# Patient Record
Sex: FEMALE | Race: WHITE | NOT HISPANIC OR LATINO | Employment: FULL TIME | URBAN - METROPOLITAN AREA
[De-identification: names, ages, dates, MRNs, and addresses within clinical notes are randomized per-mention and may not be internally consistent; named-entity substitution may affect disease eponyms.]

---

## 2023-02-22 ENCOUNTER — PATIENT MESSAGE (OUTPATIENT)
Dept: PSYCHIATRY | Facility: CLINIC | Age: 28
End: 2023-02-22
Payer: COMMERCIAL

## 2023-02-22 ENCOUNTER — OFFICE VISIT (OUTPATIENT)
Dept: INTERNAL MEDICINE | Facility: CLINIC | Age: 28
End: 2023-02-22
Payer: COMMERCIAL

## 2023-02-22 VITALS
DIASTOLIC BLOOD PRESSURE: 70 MMHG | TEMPERATURE: 98 F | HEIGHT: 67 IN | HEART RATE: 80 BPM | OXYGEN SATURATION: 99 % | WEIGHT: 234.88 LBS | SYSTOLIC BLOOD PRESSURE: 110 MMHG | BODY MASS INDEX: 36.87 KG/M2

## 2023-02-22 DIAGNOSIS — Z98.2 PRESENCE OF VENTRICULAR SHUNT: ICD-10-CM

## 2023-02-22 DIAGNOSIS — G89.29 CHRONIC ABDOMINAL PAIN: ICD-10-CM

## 2023-02-22 DIAGNOSIS — G93.2 IDIOPATHIC INTRACRANIAL HYPERTENSION: ICD-10-CM

## 2023-02-22 DIAGNOSIS — Z76.89 ENCOUNTER TO ESTABLISH CARE: Primary | ICD-10-CM

## 2023-02-22 DIAGNOSIS — R10.9 CHRONIC ABDOMINAL PAIN: ICD-10-CM

## 2023-02-22 DIAGNOSIS — R11.0 CHRONIC NAUSEA: ICD-10-CM

## 2023-02-22 DIAGNOSIS — F31.78 BIPOLAR DISORDER, IN FULL REMISSION, MOST RECENT EPISODE MIXED: ICD-10-CM

## 2023-02-22 PROCEDURE — 3078F DIAST BP <80 MM HG: CPT | Mod: CPTII,S$GLB,, | Performed by: PHYSICIAN ASSISTANT

## 2023-02-22 PROCEDURE — 99999 PR PBB SHADOW E&M-EST. PATIENT-LVL V: CPT | Mod: PBBFAC,,, | Performed by: PHYSICIAN ASSISTANT

## 2023-02-22 PROCEDURE — 3074F PR MOST RECENT SYSTOLIC BLOOD PRESSURE < 130 MM HG: ICD-10-PCS | Mod: CPTII,S$GLB,, | Performed by: PHYSICIAN ASSISTANT

## 2023-02-22 PROCEDURE — 1159F PR MEDICATION LIST DOCUMENTED IN MEDICAL RECORD: ICD-10-PCS | Mod: CPTII,S$GLB,, | Performed by: PHYSICIAN ASSISTANT

## 2023-02-22 PROCEDURE — 3008F BODY MASS INDEX DOCD: CPT | Mod: CPTII,S$GLB,, | Performed by: PHYSICIAN ASSISTANT

## 2023-02-22 PROCEDURE — 99385 PREV VISIT NEW AGE 18-39: CPT | Mod: S$GLB,,, | Performed by: PHYSICIAN ASSISTANT

## 2023-02-22 PROCEDURE — 3078F PR MOST RECENT DIASTOLIC BLOOD PRESSURE < 80 MM HG: ICD-10-PCS | Mod: CPTII,S$GLB,, | Performed by: PHYSICIAN ASSISTANT

## 2023-02-22 PROCEDURE — 3008F PR BODY MASS INDEX (BMI) DOCUMENTED: ICD-10-PCS | Mod: CPTII,S$GLB,, | Performed by: PHYSICIAN ASSISTANT

## 2023-02-22 PROCEDURE — 99385 PR PREVENTIVE VISIT,NEW,18-39: ICD-10-PCS | Mod: S$GLB,,, | Performed by: PHYSICIAN ASSISTANT

## 2023-02-22 PROCEDURE — 99999 PR PBB SHADOW E&M-EST. PATIENT-LVL V: ICD-10-PCS | Mod: PBBFAC,,, | Performed by: PHYSICIAN ASSISTANT

## 2023-02-22 PROCEDURE — 3074F SYST BP LT 130 MM HG: CPT | Mod: CPTII,S$GLB,, | Performed by: PHYSICIAN ASSISTANT

## 2023-02-22 PROCEDURE — 1159F MED LIST DOCD IN RCRD: CPT | Mod: CPTII,S$GLB,, | Performed by: PHYSICIAN ASSISTANT

## 2023-02-22 RX ORDER — BUPROPION HYDROCHLORIDE 150 MG/1
150 TABLET ORAL
COMMUNITY
Start: 2023-01-26 | End: 2023-05-25 | Stop reason: SDUPTHER

## 2023-02-22 RX ORDER — LURASIDONE HYDROCHLORIDE 120 MG/1
120 TABLET, FILM COATED ORAL NIGHTLY
COMMUNITY
End: 2023-05-25

## 2023-02-22 RX ORDER — PHENTERMINE HYDROCHLORIDE 37.5 MG/1
37.5 TABLET ORAL
COMMUNITY
Start: 2023-02-10 | End: 2023-05-29

## 2023-02-22 RX ORDER — BUSPIRONE HYDROCHLORIDE 10 MG/1
10 TABLET ORAL 2 TIMES DAILY
COMMUNITY
End: 2023-05-25 | Stop reason: SDUPTHER

## 2023-02-22 RX ORDER — ONDANSETRON 4 MG/1
4 TABLET, FILM COATED ORAL EVERY 8 HOURS PRN
Qty: 30 TABLET | Refills: 0 | Status: SHIPPED | OUTPATIENT
Start: 2023-02-22

## 2023-02-22 RX ORDER — LAMOTRIGINE 100 MG/1
100 TABLET ORAL
COMMUNITY
Start: 2023-02-03 | End: 2023-05-25 | Stop reason: SDUPTHER

## 2023-02-22 RX ORDER — TRAZODONE HYDROCHLORIDE 50 MG/1
25-50 TABLET ORAL NIGHTLY PRN
COMMUNITY
Start: 2023-02-09 | End: 2023-05-25 | Stop reason: SDUPTHER

## 2023-02-22 NOTE — PROGRESS NOTES
Subjective:       Patient ID: Rafaela Zuniga is a 27 y.o. female.    Chief Complaint: [facility] Care      Patient presents to clinic today for [facility] care annual physical.      Review of Systems   Constitutional:  Negative for chills, fatigue, fever and unexpected weight change.   HENT:  Negative for congestion, dental problem, ear pain, hearing loss, rhinorrhea and trouble swallowing.    Eyes:  Negative for pain and visual disturbance.   Respiratory:  Negative for cough and shortness of breath.    Cardiovascular:  Negative for chest pain, palpitations and leg swelling.   Gastrointestinal:  Positive for abdominal pain (chronic, previously seen GI, given Zofran, allergy testing done with no clear answers) and nausea (chronic). Negative for abdominal distention, blood in stool, constipation, diarrhea and vomiting.   Genitourinary:  Negative for difficulty urinating and vaginal discharge.   Musculoskeletal:  Negative for arthralgias and myalgias.   Skin:  Negative for rash.   Neurological:  Positive for headaches (history of migraine headaches when she was a teenage, saw ophth then [facility], diagnosed with IIH,  shunt placed, headaches improved). Negative for dizziness, weakness and numbness.   Hematological:  Negative for adenopathy. Does not bruise/bleed easily.   Psychiatric/Behavioral:  Positive for dysphoric mood (chronic) and sleep disturbance (chronic). The patient is nervous/anxious (chronic).      Objective:      Physical Exam  Vitals and nursing note reviewed.   Constitutional:       General: She is not in acute distress.     Appearance: Normal appearance. She is well-developed.   HENT:      Head: Normocephalic and atraumatic.      Right Ear: Tympanic membrane, ear canal and external ear normal.      Left Ear: Tympanic membrane, ear canal and external ear normal.      Nose: Nose normal. No mucosal edema or rhinorrhea.      Mouth/Throat:      Lips: Pink.      Mouth: Mucous membranes are moist.      Pharynx:  Oropharynx is clear. Uvula midline.   Eyes:      General: Lids are normal. No scleral icterus.     Extraocular Movements: Extraocular movements intact.      Conjunctiva/sclera: Conjunctivae normal.      Pupils: Pupils are equal, round, and reactive to light.   Neck:      Thyroid: No thyromegaly.   Cardiovascular:      Rate and Rhythm: Normal rate and regular rhythm.      Pulses: Normal pulses.   Pulmonary:      Effort: Pulmonary effort is normal.      Breath sounds: Normal breath sounds. No wheezing, rhonchi or rales.   Abdominal:      General: Bowel sounds are normal. There is no distension.      Palpations: Abdomen is soft. There is no mass.      Tenderness: There is no abdominal tenderness.   Musculoskeletal:         General: Normal range of motion.      Cervical back: Normal range of motion and neck supple.      Right lower leg: No edema.      Left lower leg: No edema.   Lymphadenopathy:      Cervical: No cervical adenopathy.   Skin:     General: Skin is warm and dry.      Findings: No lesion or rash.      Nails: There is no clubbing.   Neurological:      Mental Status: She is alert.      Cranial Nerves: No cranial nerve deficit.      Sensory: No sensory deficit.   Psychiatric:         Mood and Affect: Mood and affect normal.       Assessment:       1. Encounter to establish care    2. Chronic nausea    3. Chronic abdominal pain    4. Bipolar disorder, in full remission, most recent episode mixed    5. Idiopathic intracranial hypertension    6. Presence of ventricular shunt          Plan:   1. Encounter to establish care    2. Chronic nausea  -     Ambulatory referral/consult to Gastroenterology; Future; Expected date: 03/01/2023  -     ondansetron (ZOFRAN) 4 MG tablet; Take 1 tablet (4 mg total) by mouth every 8 (eight) hours as needed for Nausea.  Dispense: 30 tablet; Refill: 0    3. Chronic abdominal pain  -     Ambulatory referral/consult to Gastroenterology; Future; Expected date: 03/01/2023    4. Bipolar  disorder, in full remission, most recent episode mixed  Overview:  Previously followed by Psychiatry    Orders:  -     Ambulatory referral/consult to Psychiatry; Future; Expected date: 03/01/2023    5. Idiopathic intracranial hypertension  Overview:  Previously followed by neurosurgery in [place], has  shunt    Orders:  -     Ambulatory referral/consult to Neurosurgery; Future; Expected date: 03/01/2023    6. Presence of ventricular shunt  Overview:  Placed 11/8/2017 in [place]    Orders:  -     Ambulatory referral/consult to Neurosurgery; Future; Expected date: 03/01/2023        6 month f/u with Dr. Garcia scheduled  GI, NSY and Psyc referrals placed  Encouraged to reschedule GYN appt  Health Maintenance reviewed/updated.

## 2023-03-10 ENCOUNTER — TELEPHONE (OUTPATIENT)
Dept: NEUROSURGERY | Facility: CLINIC | Age: 28
End: 2023-03-10
Payer: COMMERCIAL

## 2023-03-10 NOTE — TELEPHONE ENCOUNTER
Informed pt need to reschedule appt with Dr Montez due to her being out of the office on 4/10. Pt informed me that she got her shunt placed 7 years ago and the last time she got imaging was 3 years ago at [facility]. Pt was followed by a neuro ophthalmologist in [place] and her neurosurgeon was at [place]. Pt stated she does not have any past imaging on a disc and would be unable to get it due to imaging being in [place] and she cannot retrieve it without going in person and paying a fee. Pt stated she will call her neuro ophthalmologist and neurosurgeon and have them fax us their clinic notes before the appt so we have some information. Pt stated currently not having any symptoms but wants to get imaging and care established in [place] since she has not followed up with neurosurgery in [facility]. Confirmed rescheduled appt with Claudia Araujo on 4/12 at 1pm.

## 2023-03-10 NOTE — TELEPHONE ENCOUNTER
Requested call back to reschedule appt on 4/10 due to Dr Montez being out of the office on that day

## 2023-03-31 ENCOUNTER — TELEPHONE (OUTPATIENT)
Dept: PSYCHIATRY | Facility: CLINIC | Age: 28
End: 2023-03-31
Payer: COMMERCIAL

## 2023-03-31 NOTE — TELEPHONE ENCOUNTER
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 please call back [phone]

## 2023-04-10 ENCOUNTER — PATIENT MESSAGE (OUTPATIENT)
Dept: NEUROSURGERY | Facility: CLINIC | Age: 28
End: 2023-04-10
Payer: COMMERCIAL

## 2023-04-12 ENCOUNTER — OFFICE VISIT (OUTPATIENT)
Dept: NEUROSURGERY | Facility: CLINIC | Age: 28
End: 2023-04-12
Payer: COMMERCIAL

## 2023-04-12 DIAGNOSIS — G93.2 IDIOPATHIC INTRACRANIAL HYPERTENSION: ICD-10-CM

## 2023-04-12 DIAGNOSIS — Z98.2 PRESENCE OF VENTRICULAR SHUNT: ICD-10-CM

## 2023-04-12 PROCEDURE — 99204 PR OFFICE/OUTPT VISIT, NEW, LEVL IV, 45-59 MIN: ICD-10-PCS | Mod: S$GLB,,, | Performed by: PHYSICIAN ASSISTANT

## 2023-04-12 PROCEDURE — 1159F PR MEDICATION LIST DOCUMENTED IN MEDICAL RECORD: ICD-10-PCS | Mod: CPTII,S$GLB,, | Performed by: PHYSICIAN ASSISTANT

## 2023-04-12 PROCEDURE — 1159F MED LIST DOCD IN RCRD: CPT | Mod: CPTII,S$GLB,, | Performed by: PHYSICIAN ASSISTANT

## 2023-04-12 PROCEDURE — 99204 OFFICE O/P NEW MOD 45 MIN: CPT | Mod: S$GLB,,, | Performed by: PHYSICIAN ASSISTANT

## 2023-04-12 PROCEDURE — 99999 PR PBB SHADOW E&M-EST. PATIENT-LVL III: CPT | Mod: PBBFAC,,, | Performed by: PHYSICIAN ASSISTANT

## 2023-04-12 PROCEDURE — 99999 PR PBB SHADOW E&M-EST. PATIENT-LVL III: ICD-10-PCS | Mod: PBBFAC,,, | Performed by: PHYSICIAN ASSISTANT

## 2023-04-13 NOTE — PROGRESS NOTES
Neurosurgery  History & Physical    SUBJECTIVE:     Chief Complaint: IIH, headaches    History of Present Illness:  Patient is a 27-year-old female with idiopathic intracranial hypertension status post  shunt (certas 6) placed in 2017 at [facility] who presents today to [facility] care.  She does not have recent imaging for review for review but she does bring a copy of her last CT head from 2017.  Her preoperative symptoms included pressure headaches, balance difficulty and decline in her vision.  She reports the symptoms significantly improved postoperatively and has done well with her shunt until the past few months.  She now reports headaches have returned with increasing frequency and severity.  She notes new dizziness and ringing in her ears as well as nausea with the headaches.  She states her vision is stable.  Her last Neuro-Ophthalmology evaluation was done in 2021 at [facility].  She will obtain these records for review.  She describes her headaches as pressure and pulsatile.  They are not positional.  She takes ibuprofen and Tylenol without relief.  She does note a recent 40 lb weight loss.  Her Certas valve was adjusted from 5-6 in 2020 for concerns of over shunting.    Review of patient's allergies indicates:  No Known Allergies    Current Outpatient Medications   Medication Sig Dispense Refill    buPROPion (WELLBUTRIN XL) 150 MG TB24 tablet Take 150 mg by mouth.      busPIRone (BUSPAR) 10 MG tablet Take 10 mg by mouth once daily.      lamoTRIgine (LAMICTAL) 100 MG tablet Take 100 mg by mouth.      ondansetron (ZOFRAN) 4 MG tablet Take 1 tablet (4 mg total) by mouth every 8 (eight) hours as needed for Nausea. 30 tablet 0    traZODone (DESYREL) 50 MG tablet Take 25-50 mg by mouth nightly as needed.      lurasidone 120 mg Tab Take 120 mg by mouth nightly.      phentermine (ADIPEX-P) 37.5 mg tablet Take 37.5 mg by mouth.       No current facility-administered medications for this  visit.       Past Medical History:   Diagnosis Date    Anxiety     Bipolar disorder     Depression     Idiopathic intracranial hypertension     Insomnia      Past Surgical History:   Procedure Laterality Date    brain shunt      eye sheath finistration Bilateral      Family History       Problem Relation (Age of Onset)    Depression Maternal Grandmother    Diabetes Maternal Grandmother    Heart disease Mother, Maternal Grandfather    Thyroid disease Maternal Grandmother          Social History     Socioeconomic History    Marital status: Single   Tobacco Use    Smoking status: Never    Smokeless tobacco: Never   Substance and Sexual Activity    Alcohol use: Yes     Comment: Drinks socially    Drug use: Never    Sexual activity: Yes     Partners: Female, Male     Birth control/protection: I.U.D.       Review of Systems   Gastrointestinal:  Positive for nausea. Negative for vomiting.   Musculoskeletal:  Positive for gait problem.   Neurological:  Positive for dizziness and headaches. Negative for seizures, weakness and numbness.   All other systems reviewed and are negative.    OBJECTIVE:     Vital Signs  Pain Score: 0-No pain  There is no height or weight on file to calculate BMI.      Neurosurgery Physical Exam  General: well developed, well nourished, no distress.   Head: normocephalic, atraumatic  Neurologic: Alert and oriented. Thought content appropriate.  GCS: Motor: 6/Verbal: 5/Eyes: 4 GCS Total: 15  Mental Status: Awake, Alert, Oriented x3  Cranial nerves: face symmetric, tongue midline, CN II-XII grossly intact.   Eyes: pupils equal, round, reactive to light with accomodation, EOMI.   Sensory: intact to light touch throughout  Motor Strength: Moves all extremities spontaneously with good tone.  Full strength upper and lower extremities. No abnormal movements seen.   DTR's - 2 + and symmetric in UE and LE  Pronator Drift: no drift noted  Finger-to-nose: Intact bilaterally  Morgan: absent  Clonus:  absent  Gait: normal      Diagnostic Results:  None new    ASSESSMENT/PLAN:     27-year-old female with IIH status post  shunt (Certas 6) who presents today with complaint of worsening headaches.  I would like to obtain an updated CT head as well as shunt series to evaluate her shunt.  I would also like an MRV to evaluate for venous stenosis.  I will refer her to Neuro-Ophthalmology for an updated eye exam.  She will obtain previous records from her neuro ophthalmologist at [facility].  I will plan to see her back once the above imaging is complete.  She was encouraged to call us with any new or worsening symptoms in the interim.      Claudia Araujo PA-C  Neurosurgery          Note dictated with voice recognition software, please excuse any grammatical errors.

## 2023-04-21 ENCOUNTER — TELEPHONE (OUTPATIENT)
Dept: NEUROSURGERY | Facility: CLINIC | Age: 28
End: 2023-04-21
Payer: COMMERCIAL

## 2023-04-25 ENCOUNTER — OFFICE VISIT (OUTPATIENT)
Dept: GASTROENTEROLOGY | Facility: CLINIC | Age: 28
End: 2023-04-25
Payer: COMMERCIAL

## 2023-04-25 VITALS
HEIGHT: 67 IN | OXYGEN SATURATION: 98 % | BODY MASS INDEX: 35.11 KG/M2 | SYSTOLIC BLOOD PRESSURE: 124 MMHG | WEIGHT: 223.69 LBS | HEART RATE: 93 BPM | DIASTOLIC BLOOD PRESSURE: 88 MMHG

## 2023-04-25 DIAGNOSIS — R11.0 CHRONIC NAUSEA: Primary | ICD-10-CM

## 2023-04-25 DIAGNOSIS — R19.7 DIARRHEA, UNSPECIFIED TYPE: ICD-10-CM

## 2023-04-25 DIAGNOSIS — G89.29 CHRONIC ABDOMINAL PAIN: ICD-10-CM

## 2023-04-25 DIAGNOSIS — R10.9 CHRONIC ABDOMINAL PAIN: ICD-10-CM

## 2023-04-25 PROCEDURE — 3008F PR BODY MASS INDEX (BMI) DOCUMENTED: ICD-10-PCS | Mod: CPTII,S$GLB,, | Performed by: INTERNAL MEDICINE

## 2023-04-25 PROCEDURE — 1159F PR MEDICATION LIST DOCUMENTED IN MEDICAL RECORD: ICD-10-PCS | Mod: CPTII,S$GLB,, | Performed by: INTERNAL MEDICINE

## 2023-04-25 PROCEDURE — 3079F DIAST BP 80-89 MM HG: CPT | Mod: CPTII,S$GLB,, | Performed by: INTERNAL MEDICINE

## 2023-04-25 PROCEDURE — 1159F MED LIST DOCD IN RCRD: CPT | Mod: CPTII,S$GLB,, | Performed by: INTERNAL MEDICINE

## 2023-04-25 PROCEDURE — 99204 OFFICE O/P NEW MOD 45 MIN: CPT | Mod: S$GLB,,, | Performed by: INTERNAL MEDICINE

## 2023-04-25 PROCEDURE — 3008F BODY MASS INDEX DOCD: CPT | Mod: CPTII,S$GLB,, | Performed by: INTERNAL MEDICINE

## 2023-04-25 PROCEDURE — 3079F PR MOST RECENT DIASTOLIC BLOOD PRESSURE 80-89 MM HG: ICD-10-PCS | Mod: CPTII,S$GLB,, | Performed by: INTERNAL MEDICINE

## 2023-04-25 PROCEDURE — 99204 PR OFFICE/OUTPT VISIT, NEW, LEVL IV, 45-59 MIN: ICD-10-PCS | Mod: S$GLB,,, | Performed by: INTERNAL MEDICINE

## 2023-04-25 PROCEDURE — 3074F PR MOST RECENT SYSTOLIC BLOOD PRESSURE < 130 MM HG: ICD-10-PCS | Mod: CPTII,S$GLB,, | Performed by: INTERNAL MEDICINE

## 2023-04-25 PROCEDURE — 99999 PR PBB SHADOW E&M-EST. PATIENT-LVL V: CPT | Mod: PBBFAC,,, | Performed by: INTERNAL MEDICINE

## 2023-04-25 PROCEDURE — 99999 PR PBB SHADOW E&M-EST. PATIENT-LVL V: ICD-10-PCS | Mod: PBBFAC,,, | Performed by: INTERNAL MEDICINE

## 2023-04-25 PROCEDURE — 3074F SYST BP LT 130 MM HG: CPT | Mod: CPTII,S$GLB,, | Performed by: INTERNAL MEDICINE

## 2023-04-25 RX ORDER — SODIUM, POTASSIUM,MAG SULFATES 17.5-3.13G
1 SOLUTION, RECONSTITUTED, ORAL ORAL DAILY
Qty: 1 KIT | Refills: 0 | Status: SHIPPED | OUTPATIENT
Start: 2023-04-25 | End: 2023-04-27

## 2023-04-25 RX ORDER — PANTOPRAZOLE SODIUM 40 MG/1
40 TABLET, DELAYED RELEASE ORAL DAILY
Qty: 30 TABLET | Refills: 3 | Status: SHIPPED | OUTPATIENT
Start: 2023-04-25 | End: 2023-07-24

## 2023-04-25 NOTE — PROGRESS NOTES
[facility] [place]  Gastroenterology    Patient evaluated at the request of Brenda Acosta PA-C  [address] [place] [address]    PCP: Anila Garcia MD    4/25/23    HPI       Diarrhea     Additional comments: Pt present today with c/o rt flank pain, nausea after eating, sores in mouth, diarrhea           Last edited by Toni Milligan, MA on 4/25/2023  2:13 PM.        Reason for Visit: Chronic abdominal pain, chronic nausea    Subjective:   Rafaela Zuniga is a 27 y.o. female here for evaluation of chronic abdominal pain and nausea which are symptoms she has had for years. States [place] and [place] a GI there but was not told much. She has chronic postprandial abd pain, nausea, can break out into rashes after eating, diarrhea after eating and sores in mouth. Celiac testing previously negative. She had allergy testing done which was negative. She had an xray that showed constipation and she was placed onto Miralax but symptoms continued. Never tried a PPI. No prior gallbladder evaluation either although this was being considered. No prior endoscopies done.       Past Medical History:   Diagnosis Date    Anxiety     Bipolar disorder     Depression     Idiopathic intracranial hypertension     Insomnia        Past Surgical History:   Procedure Laterality Date    brain shunt      eye sheath finistration Bilateral        Current Outpatient Medications on File Prior to Visit   Medication Sig Dispense Refill    buPROPion (WELLBUTRIN XL) 150 MG TB24 tablet Take 150 mg by mouth.      busPIRone (BUSPAR) 10 MG tablet Take 10 mg by mouth once daily.      lamoTRIgine (LAMICTAL) 100 MG tablet Take 100 mg by mouth.      lurasidone 120 mg Tab Take 120 mg by mouth nightly.      ondansetron (ZOFRAN) 4 MG tablet Take 1 tablet (4 mg total) by mouth every 8 (eight) hours as needed for Nausea. 30 tablet 0    traZODone (DESYREL) 50 MG tablet Take 25-50 mg by mouth nightly as  needed.      phentermine (ADIPEX-P) 37.5 mg tablet Take 37.5 mg by mouth.       No current facility-administered medications on file prior to visit.       Review of patient's allergies indicates:  No Known Allergies    Social History     Socioeconomic History    Marital status: Single   Tobacco Use    Smoking status: Never    Smokeless tobacco: Never   Substance and Sexual Activity    Alcohol use: Yes     Comment: Drinks socially    Drug use: Never    Sexual activity: Yes     Partners: Female, Male     Birth control/protection: I.U.D.       Family History   Problem Relation Age of Onset    Heart disease Mother     Diabetes Maternal Grandmother     Depression Maternal Grandmother     Thyroid disease Maternal Grandmother     Heart disease Maternal Grandfather        Review of Systems   Constitutional:  Negative for appetite change, fever and unexpected weight change.   HENT:  Negative for postnasal drip, rhinorrhea, sneezing, sore throat and trouble swallowing.    Eyes:  Negative for visual disturbance.   Respiratory:  Negative for cough, shortness of breath and wheezing.    Cardiovascular:  Negative for chest pain, palpitations and leg swelling.   Gastrointestinal:  Positive for abdominal pain, diarrhea and nausea. Negative for blood in stool, constipation and vomiting.   Genitourinary:  Negative for dysuria.   Musculoskeletal:  Negative for arthralgias, joint swelling and myalgias.   Skin:  Negative for color change, pallor and rash.   Neurological:  Negative for weakness, light-headedness, numbness and headaches.   Hematological:  Negative for adenopathy. Does not bruise/bleed easily.   Psychiatric/Behavioral:  Negative for agitation.          Objective:   Vitals:   Vitals:    04/25/23 1410   BP: 124/88   Pulse: 93       Physical Exam  Vitals reviewed.   Constitutional:       General: She is not in acute distress.     Appearance: She is not diaphoretic.   HENT:      Head: Normocephalic and atraumatic.       Mouth/Throat:      Pharynx: No oropharyngeal exudate.   Eyes:      General: No scleral icterus.        Right eye: No discharge.         Left eye: No discharge.      Conjunctiva/sclera: Conjunctivae normal.      Pupils: Pupils are equal, round, and reactive to light.   Cardiovascular:      Rate and Rhythm: Normal rate and regular rhythm.      Heart sounds: Normal heart sounds. No murmur heard.    No friction rub. No gallop.   Pulmonary:      Effort: Pulmonary effort is normal. No respiratory distress.      Breath sounds: Normal breath sounds. No stridor. No wheezing or rales.   Abdominal:      General: Bowel sounds are normal. There is no distension.      Palpations: Abdomen is soft. There is no mass.      Tenderness: There is no abdominal tenderness. There is no guarding.   Musculoskeletal:         General: Normal range of motion.      Cervical back: Normal range of motion.   Skin:     General: Skin is warm and dry.      Coloration: Skin is not pale.      Findings: No erythema or rash.   Neurological:      Mental Status: She is alert and oriented to person, place, and time.       IMPRESSION     Problem List Items Addressed This Visit    None  Visit Diagnoses       Chronic nausea    -  Primary    Relevant Orders    Case Request Endoscopy: EGD (ESOPHAGOGASTRODUODENOSCOPY), COLONOSCOPY (Completed)    Ambulatory referral/consult to Endo Procedure     Chronic abdominal pain        Relevant Orders    Case Request Endoscopy: EGD (ESOPHAGOGASTRODUODENOSCOPY), COLONOSCOPY (Completed)    Ambulatory referral/consult to Endo Procedure     Diarrhea, unspecified type        Relevant Orders    Case Request Endoscopy: EGD (ESOPHAGOGASTRODUODENOSCOPY), COLONOSCOPY (Completed)    Ambulatory referral/consult to Endo Procedure             PLANS:    - As some of this could be related to dyspepsia, will do a PPI trial for at least one month. Protonix 40 mg po daily prescribed and proper administration  instructions discussed  - Prior celiac testing negative per patient  - Will schedule for EGD and colonoscopy for further evaluation to r/o IBD or other cause of symptoms  - If symptoms persist, can then repeat stool studies here and also repeat an xray for assessment of overflow diarrhea- as this was felt to previously be the cause of the diarrhea  - Further recommendations pending the above    Chronic nausea  -     Ambulatory referral/consult to Gastroenterology  -     Case Request Endoscopy: EGD (ESOPHAGOGASTRODUODENOSCOPY), COLONOSCOPY  -     Ambulatory referral/consult to Endo Procedure ; Future; Expected date: 04/26/2023    Chronic abdominal pain  -     Ambulatory referral/consult to Gastroenterology  -     Case Request Endoscopy: EGD (ESOPHAGOGASTRODUODENOSCOPY), COLONOSCOPY  -     Ambulatory referral/consult to Endo Procedure ; Future; Expected date: 04/26/2023    Diarrhea, unspecified type  -     Case Request Endoscopy: EGD (ESOPHAGOGASTRODUODENOSCOPY), COLONOSCOPY  -     Ambulatory referral/consult to Endo Procedure ; Future; Expected date: 04/26/2023    Other orders  -     pantoprazole (PROTONIX) 40 MG tablet; Take 1 tablet (40 mg total) by mouth once daily.  Dispense: 30 tablet; Refill: 3  -     sodium,potassium,mag sulfates (SUPREP BOWEL PREP KIT) 17.5-3.13-1.6 gram SolR; Take 177 mLs by mouth once daily. for 2 days  Dispense: 1 kit; Refill: 0      Yuli Hawk MD  Gastroenterology

## 2023-04-28 ENCOUNTER — PATIENT MESSAGE (OUTPATIENT)
Dept: NEUROSURGERY | Facility: CLINIC | Age: 28
End: 2023-04-28
Payer: COMMERCIAL

## 2023-05-04 ENCOUNTER — OFFICE VISIT (OUTPATIENT)
Dept: OPHTHALMOLOGY | Facility: CLINIC | Age: 28
End: 2023-05-04
Payer: COMMERCIAL

## 2023-05-04 ENCOUNTER — CLINICAL SUPPORT (OUTPATIENT)
Dept: OPHTHALMOLOGY | Facility: CLINIC | Age: 28
End: 2023-05-04
Payer: COMMERCIAL

## 2023-05-04 DIAGNOSIS — G93.2 IIH (IDIOPATHIC INTRACRANIAL HYPERTENSION): Primary | ICD-10-CM

## 2023-05-04 DIAGNOSIS — G93.2 IDIOPATHIC INTRACRANIAL HYPERTENSION: ICD-10-CM

## 2023-05-04 DIAGNOSIS — Z98.2 PRESENCE OF VENTRICULAR SHUNT: ICD-10-CM

## 2023-05-04 DIAGNOSIS — G93.2 IIH (IDIOPATHIC INTRACRANIAL HYPERTENSION): ICD-10-CM

## 2023-05-04 DIAGNOSIS — H53.423 SCOTOMA OF BLIND SPOT AREA OF BOTH EYES: ICD-10-CM

## 2023-05-04 DIAGNOSIS — H53.453 NASAL STEP VISUAL FIELD DEFECT OF BOTH EYES: Primary | ICD-10-CM

## 2023-05-04 PROCEDURE — 99999 PR PBB SHADOW E&M-EST. PATIENT-LVL III: ICD-10-PCS | Mod: PBBFAC,,, | Performed by: OPHTHALMOLOGY

## 2023-05-04 PROCEDURE — 99999 PR PBB SHADOW E&M-EST. PATIENT-LVL III: CPT | Mod: PBBFAC,,, | Performed by: OPHTHALMOLOGY

## 2023-05-04 PROCEDURE — 92133 CPTRZD OPH DX IMG PST SGM ON: CPT | Mod: S$GLB,,, | Performed by: OPHTHALMOLOGY

## 2023-05-04 PROCEDURE — 99203 PR OFFICE/OUTPT VISIT, NEW, LEVL III, 30-44 MIN: ICD-10-PCS | Mod: S$GLB,,, | Performed by: OPHTHALMOLOGY

## 2023-05-04 PROCEDURE — 1159F PR MEDICATION LIST DOCUMENTED IN MEDICAL RECORD: ICD-10-PCS | Mod: CPTII,S$GLB,, | Performed by: OPHTHALMOLOGY

## 2023-05-04 PROCEDURE — 1160F PR REVIEW ALL MEDS BY PRESCRIBER/CLIN PHARMACIST DOCUMENTED: ICD-10-PCS | Mod: CPTII,S$GLB,, | Performed by: OPHTHALMOLOGY

## 2023-05-04 PROCEDURE — 1159F MED LIST DOCD IN RCRD: CPT | Mod: CPTII,S$GLB,, | Performed by: OPHTHALMOLOGY

## 2023-05-04 PROCEDURE — 1160F RVW MEDS BY RX/DR IN RCRD: CPT | Mod: CPTII,S$GLB,, | Performed by: OPHTHALMOLOGY

## 2023-05-04 PROCEDURE — 99203 OFFICE O/P NEW LOW 30 MIN: CPT | Mod: S$GLB,,, | Performed by: OPHTHALMOLOGY

## 2023-05-04 PROCEDURE — 92133 POSTERIOR SEGMENT OCT OPTIC NERVE(OCULAR COHERENCE TOMOGRAPHY) - OU - BOTH EYES: ICD-10-PCS | Mod: S$GLB,,, | Performed by: OPHTHALMOLOGY

## 2023-05-04 NOTE — PATIENT INSTRUCTIONS
Ms. Zuniga showed no signs of optic disc edema or persistently elevated intracranial pressure. She will continue current treatment plan. I will repeat her exam and special testing in 6 months

## 2023-05-04 NOTE — LETTER
[facility] - [address] [place] [address]  [address]  [facility] [address]  Phone: [phone]  Fax: [phone]   May 4, 2023    Claudia Araujo PA-C  [address]  [facility] [address]    Patient: Rafaela Zuniga   MR Number: [id number]   YOB: 1995   Date of Visit: 5/4/2023       Dear Dr. Araujo:    Thank you for referring Rafaela Zuniga to me for evaluation. Here is my assessment and plan of care:    Assessment/Plan    For exam results, see Encounter Report.    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 Zuniga showed no signs of optic disc edema or persistently elevated intracranial pressure. She will continue current treatment plan. I will repeat her exam and special testing in 6 months.            Below you will find my full exam findings. If you have questions, please do not hesitate to call me. I look forward to following Ms. Rafaela Zuniga along with you.    Sincerely,          Mario Richardson MD       CC  No Recipients             Base Eye Exam       Visual Acuity (Snellen - Linear)         Right Left    Dist cc 20/30 -2 20/30 -1    Dist ph cc NI NI      Correction: Glasses              Tonometry (Tonopen, 3:30 PM)         Right Left    Pressure 16 20              Pupils         Dark Light Shape React APD    Right 5 3 Round Slow None    Left     None              Visual Fields    See HVF report             Extraocular Movement         Right Left     Full, Ortho Full, Ortho              Dilation       Both eyes: 2.5% Phenylephrine, 1% Mydriacyl @ 3:30 PM                  Slit Lamp and Fundus Exam       External Exam         Right Left    External Normal Normal              Slit Lamp Exam         Right Left    Lids/Lashes Normal Normal     Conjunctiva/Sclera White and quiet White and quiet    Cornea Clear Clear    Anterior Chamber Deep and quiet Deep and quiet    Iris Round and reactive Round and reactive    Lens Clear Clear    Vitreous Normal Normal              Fundus Exam         Right Left    Disc No edema. Normal color. Patchy gliosis. No active edema. Predominantly normal color.    C/D Ratio 0.2 0.2    Macula Normal Normal    Vessels Normal Normal    Periphery Normal Normal

## 2023-05-04 NOTE — PROGRESS NOTES
HPI    Referred by Dr.Caitlin aVn INIGUEZ  Establishing care here at [facility].    Dx w/IIH(Pseudotumor Cerebri)  Hx of Sheath fenestration.  Hx of  shunt.    Patient states OU vision has improved since, bu past month seeing floaters   and migraines.    Review HVF    I have personally interviewed the patient, reviewed the history and   examined the patient and agree with the technician's exam.   Last edited by Mario Richardson MD on 5/4/2023  3:01 PM.            Assessment /Plan     For exam results, see Encounter Report.    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 Zuniga showed no signs of optic disc edema or persistently elevated intracranial pressure. She will continue current treatment plan. I will repeat her exam and special testing in 6 months.

## 2023-05-04 NOTE — PROGRESS NOTES
Visual field test done.  Patient stated no latex allergies used coverlet      -0.75+ 0.25 x 64  -1.00+ 0.50 x 106

## 2023-05-05 ENCOUNTER — HOSPITAL ENCOUNTER (OUTPATIENT)
Dept: PREADMISSION TESTING | Facility: HOSPITAL | Age: 28
Discharge: HOME | End: 2023-05-05
Attending: INTERNAL MEDICINE
Payer: COMMERCIAL

## 2023-05-05 DIAGNOSIS — R10.9 CHRONIC ABDOMINAL PAIN: ICD-10-CM

## 2023-05-05 DIAGNOSIS — R11.0 CHRONIC NAUSEA: ICD-10-CM

## 2023-05-05 DIAGNOSIS — G89.29 CHRONIC ABDOMINAL PAIN: ICD-10-CM

## 2023-05-05 DIAGNOSIS — R19.7 DIARRHEA, UNSPECIFIED TYPE: ICD-10-CM

## 2023-05-10 ENCOUNTER — TELEPHONE (OUTPATIENT)
Dept: NEUROSURGERY | Facility: CLINIC | Age: 28
End: 2023-05-10
Payer: COMMERCIAL

## 2023-05-16 ENCOUNTER — HOSPITAL ENCOUNTER (OUTPATIENT)
Dept: PREADMISSION TESTING | Facility: HOSPITAL | Age: 28
Discharge: HOME | End: 2023-05-16
Attending: COLON & RECTAL SURGERY
Payer: COMMERCIAL
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
## 2023-05-25 ENCOUNTER — OFFICE VISIT (OUTPATIENT)
Dept: PSYCHIATRY | Facility: CLINIC | Age: 28
End: 2023-05-25
Payer: COMMERCIAL

## 2023-05-25 VITALS
BODY MASS INDEX: 33.8 KG/M2 | HEART RATE: 90 BPM | SYSTOLIC BLOOD PRESSURE: 118 MMHG | DIASTOLIC BLOOD PRESSURE: 82 MMHG | WEIGHT: 215.81 LBS

## 2023-05-25 DIAGNOSIS — Z87.828 HISTORY OF TRAUMA: ICD-10-CM

## 2023-05-25 DIAGNOSIS — F41.1 GENERALIZED ANXIETY DISORDER WITH PANIC ATTACKS: ICD-10-CM

## 2023-05-25 DIAGNOSIS — F51.05 INSOMNIA DUE TO MENTAL CONDITION: ICD-10-CM

## 2023-05-25 DIAGNOSIS — F41.0 GENERALIZED ANXIETY DISORDER WITH PANIC ATTACKS: ICD-10-CM

## 2023-05-25 DIAGNOSIS — F31.32 BIPOLAR 1 DISORDER, DEPRESSED, MODERATE: Primary | ICD-10-CM

## 2023-05-25 DIAGNOSIS — F50.9 EATING DISORDER, UNSPECIFIED TYPE: ICD-10-CM

## 2023-05-25 DIAGNOSIS — Z91.52 PERSONAL HISTORY OF NONSUICIDAL SELF-INJURY: ICD-10-CM

## 2023-05-25 PROCEDURE — 3008F PR BODY MASS INDEX (BMI) DOCUMENTED: ICD-10-PCS | Mod: CPTII,S$GLB,, | Performed by: PSYCHOLOGIST

## 2023-05-25 PROCEDURE — 3079F DIAST BP 80-89 MM HG: CPT | Mod: CPTII,S$GLB,, | Performed by: PSYCHOLOGIST

## 2023-05-25 PROCEDURE — 3074F PR MOST RECENT SYSTOLIC BLOOD PRESSURE < 130 MM HG: ICD-10-PCS | Mod: CPTII,S$GLB,, | Performed by: PSYCHOLOGIST

## 2023-05-25 PROCEDURE — 3079F PR MOST RECENT DIASTOLIC BLOOD PRESSURE 80-89 MM HG: ICD-10-PCS | Mod: CPTII,S$GLB,, | Performed by: PSYCHOLOGIST

## 2023-05-25 PROCEDURE — 3074F SYST BP LT 130 MM HG: CPT | Mod: CPTII,S$GLB,, | Performed by: PSYCHOLOGIST

## 2023-05-25 PROCEDURE — 99205 PR OFFICE/OUTPT VISIT, NEW, LEVL V, 60-74 MIN: ICD-10-PCS | Mod: S$GLB,,, | Performed by: PSYCHOLOGIST

## 2023-05-25 PROCEDURE — 3008F BODY MASS INDEX DOCD: CPT | Mod: CPTII,S$GLB,, | Performed by: PSYCHOLOGIST

## 2023-05-25 PROCEDURE — 99999 PR PBB SHADOW E&M-EST. PATIENT-LVL III: ICD-10-PCS | Mod: PBBFAC,,, | Performed by: PSYCHOLOGIST

## 2023-05-25 PROCEDURE — 99205 OFFICE O/P NEW HI 60 MIN: CPT | Mod: S$GLB,,, | Performed by: PSYCHOLOGIST

## 2023-05-25 PROCEDURE — 99999 PR PBB SHADOW E&M-EST. PATIENT-LVL III: CPT | Mod: PBBFAC,,, | Performed by: PSYCHOLOGIST

## 2023-05-25 RX ORDER — TRAZODONE HYDROCHLORIDE 50 MG/1
25-50 TABLET ORAL NIGHTLY PRN
Qty: 90 TABLET | Refills: 0 | Status: SHIPPED | OUTPATIENT
Start: 2023-05-25 | End: 2023-07-24 | Stop reason: SDUPTHER

## 2023-05-25 RX ORDER — DIAZEPAM 5 MG/1
5 TABLET ORAL 2 TIMES DAILY PRN
Qty: 60 TABLET | Refills: 2 | Status: SHIPPED | OUTPATIENT
Start: 2023-05-25 | End: 2023-07-24 | Stop reason: SDUPTHER

## 2023-05-25 RX ORDER — BUPROPION HYDROCHLORIDE 300 MG/1
300 TABLET ORAL DAILY
Qty: 90 TABLET | Refills: 0 | Status: SHIPPED | OUTPATIENT
Start: 2023-05-25 | End: 2023-08-23 | Stop reason: SDUPTHER

## 2023-05-25 RX ORDER — LAMOTRIGINE 100 MG/1
100 TABLET ORAL DAILY
Qty: 90 TABLET | Refills: 0 | Status: SHIPPED | OUTPATIENT
Start: 2023-05-25 | End: 2023-05-29

## 2023-05-25 RX ORDER — BUSPIRONE HYDROCHLORIDE 10 MG/1
10 TABLET ORAL 2 TIMES DAILY
Qty: 180 TABLET | Refills: 0 | Status: SHIPPED | OUTPATIENT
Start: 2023-05-25 | End: 2023-07-24

## 2023-05-25 RX ORDER — ARIPIPRAZOLE 2 MG/1
2 TABLET ORAL DAILY
Qty: 90 TABLET | Refills: 0 | Status: SHIPPED | OUTPATIENT
Start: 2023-05-25 | End: 2023-07-24 | Stop reason: SDUPTHER

## 2023-05-29 ENCOUNTER — OFFICE VISIT (OUTPATIENT)
Dept: OBSTETRICS AND GYNECOLOGY | Facility: CLINIC | Age: 28
End: 2023-05-29
Payer: COMMERCIAL

## 2023-05-29 VITALS
WEIGHT: 218.25 LBS | HEIGHT: 67 IN | SYSTOLIC BLOOD PRESSURE: 112 MMHG | DIASTOLIC BLOOD PRESSURE: 64 MMHG | BODY MASS INDEX: 34.26 KG/M2

## 2023-05-29 DIAGNOSIS — Z11.3 ENCOUNTER FOR SCREENING FOR INFECTIONS WITH PREDOMINANTLY SEXUAL MODE OF TRANSMISSION: ICD-10-CM

## 2023-05-29 DIAGNOSIS — Z97.5 IUD (INTRAUTERINE DEVICE) IN PLACE: ICD-10-CM

## 2023-05-29 DIAGNOSIS — Z01.419 ENCOUNTER FOR GYNECOLOGICAL EXAMINATION WITHOUT ABNORMAL FINDING: Primary | ICD-10-CM

## 2023-05-29 DIAGNOSIS — N89.8 VAGINAL DISCHARGE: ICD-10-CM

## 2023-05-29 PROCEDURE — 3078F DIAST BP <80 MM HG: CPT | Mod: CPTII,S$GLB,, | Performed by: NURSE PRACTITIONER

## 2023-05-29 PROCEDURE — 1159F PR MEDICATION LIST DOCUMENTED IN MEDICAL RECORD: ICD-10-PCS | Mod: CPTII,S$GLB,, | Performed by: NURSE PRACTITIONER

## 2023-05-29 PROCEDURE — 1160F PR REVIEW ALL MEDS BY PRESCRIBER/CLIN PHARMACIST DOCUMENTED: ICD-10-PCS | Mod: CPTII,S$GLB,, | Performed by: NURSE PRACTITIONER

## 2023-05-29 PROCEDURE — 88175 CYTOPATH C/V AUTO FLUID REDO: CPT | Performed by: NURSE PRACTITIONER

## 2023-05-29 PROCEDURE — 3008F BODY MASS INDEX DOCD: CPT | Mod: CPTII,S$GLB,, | Performed by: NURSE PRACTITIONER

## 2023-05-29 PROCEDURE — 99999 PR PBB SHADOW E&M-EST. PATIENT-LVL III: ICD-10-PCS | Mod: PBBFAC,,, | Performed by: NURSE PRACTITIONER

## 2023-05-29 PROCEDURE — 81514 NFCT DS BV&VAGINITIS DNA ALG: CPT | Performed by: NURSE PRACTITIONER

## 2023-05-29 PROCEDURE — 3078F PR MOST RECENT DIASTOLIC BLOOD PRESSURE < 80 MM HG: ICD-10-PCS | Mod: CPTII,S$GLB,, | Performed by: NURSE PRACTITIONER

## 2023-05-29 PROCEDURE — 87591 N.GONORRHOEAE DNA AMP PROB: CPT | Performed by: NURSE PRACTITIONER

## 2023-05-29 PROCEDURE — 3008F PR BODY MASS INDEX (BMI) DOCUMENTED: ICD-10-PCS | Mod: CPTII,S$GLB,, | Performed by: NURSE PRACTITIONER

## 2023-05-29 PROCEDURE — 3074F SYST BP LT 130 MM HG: CPT | Mod: CPTII,S$GLB,, | Performed by: NURSE PRACTITIONER

## 2023-05-29 PROCEDURE — 1160F RVW MEDS BY RX/DR IN RCRD: CPT | Mod: CPTII,S$GLB,, | Performed by: NURSE PRACTITIONER

## 2023-05-29 PROCEDURE — 3074F PR MOST RECENT SYSTOLIC BLOOD PRESSURE < 130 MM HG: ICD-10-PCS | Mod: CPTII,S$GLB,, | Performed by: NURSE PRACTITIONER

## 2023-05-29 PROCEDURE — 99999 PR PBB SHADOW E&M-EST. PATIENT-LVL III: CPT | Mod: PBBFAC,,, | Performed by: NURSE PRACTITIONER

## 2023-05-29 PROCEDURE — 99385 PR PREVENTIVE VISIT,NEW,18-39: ICD-10-PCS | Mod: S$GLB,,, | Performed by: NURSE PRACTITIONER

## 2023-05-29 PROCEDURE — 99385 PREV VISIT NEW AGE 18-39: CPT | Mod: S$GLB,,, | Performed by: NURSE PRACTITIONER

## 2023-05-29 PROCEDURE — 1159F MED LIST DOCD IN RCRD: CPT | Mod: CPTII,S$GLB,, | Performed by: NURSE PRACTITIONER

## 2023-05-29 RX ORDER — POLYETHYLENE GLYCOL-3350 AND ELECTROLYTES WITH FLAVOR PACK 240; 5.84; 2.98; 6.72; 22.72 G/278.26G; G/278.26G; G/278.26G; G/278.26G; G/278.26G
4000 POWDER, FOR SOLUTION ORAL
COMMUNITY
Start: 2023-05-17 | End: 2023-07-24

## 2023-05-29 NOTE — PROGRESS NOTES
Subjective:       Patient ID: Rafaela Zuniga is a 27 y.o. female.    Chief Complaint:  Well Woman      History of Present Illness  Annual Exam-Premenopausal  G0 presents for annual exam. The patient has no complaints today. The patient is not currently sexually active in months; declines bloodwork today. GYN screening history: last pap: approximate date  and was normal. That was her first pap.  The patient wears seatbelts: yes. The patient participates in regular exercise: yes. Has the patient ever been transfused or tattooed?: yes. Tattoos.  The patient reports that there is not domestic violence in her life.    Spotting monthly a couple days with rasta.  Had inserted in [place] right before she moved last year.      C/o milky vaginal discharge in the last couple months; no odor, itching or burning.  Showers with different soaps.      7th grade  at [employer]. From [place]  Has completed gardasil series.    GYN & OB History  No LMP recorded. Patient has had an implant.   Date of Last Pap: 2023    OB History    Para Term  AB Living   0 0 0 0 0 0   SAB IAB Ectopic Multiple Live Births   0 0 0 0 0       Review of Systems  Review of Systems   Constitutional:  Negative for activity change, appetite change, chills, fatigue and fever.   HENT:  Negative for nasal congestion and mouth sores.    Respiratory:  Negative for cough, shortness of breath and wheezing.    Cardiovascular:  Negative for chest pain.   Gastrointestinal:  Negative for abdominal pain, constipation, diarrhea, nausea and vomiting.   Endocrine: Negative for hair loss and hot flashes.   Genitourinary:  Positive for menorrhagia, pelvic pain and vaginal discharge. Negative for bladder incontinence, decreased libido, dysmenorrhea, dyspareunia, dysuria, flank pain, frequency, genital sores, hematuria, hot flashes, menstrual problem, urgency, vaginal pain, urinary incontinence, postcoital bleeding,  postmenopausal bleeding, vaginal dryness and vaginal odor.   Musculoskeletal:  Negative for back pain.   Integumentary:  Negative for rash, breast mass, nipple discharge, breast skin changes and breast tenderness.   Neurological:  Negative for headaches.   Hematological:  Negative for adenopathy.   Psychiatric/Behavioral:  Negative for depression and sleep disturbance. The patient is not nervous/anxious.    All other systems reviewed and are negative.  Breast: Negative for breast self exam, lump, mass, mastodynia, nipple discharge, skin changes and tenderness        Objective:      Physical Exam:   Constitutional: She is oriented to person, place, and time. She appears well-developed and well-nourished. No distress.    HENT:   Head: Normocephalic and atraumatic.   Nose: Nose normal.    Eyes: Pupils are equal, round, and reactive to light. Conjunctivae and EOM are normal. Right eye exhibits no discharge. Left eye exhibits no discharge.     Cardiovascular:  Normal rate, regular rhythm and normal heart sounds.      Exam reveals no gallop, no friction rub, no clubbing, no cyanosis and no edema.       No murmur heard.   Pulmonary/Chest: Effort normal and breath sounds normal. No respiratory distress. She has no decreased breath sounds. She has no wheezes. She has no rhonchi. She has no rales. She exhibits no tenderness. Right breast exhibits no inverted nipple, no mass, no nipple discharge, no skin change, no tenderness, no bleeding and no swelling. Left breast exhibits no inverted nipple, no mass, no nipple discharge, no skin change, no tenderness, no bleeding and no swelling. Breasts are symmetrical.        Abdominal: Soft. Bowel sounds are normal. She exhibits no distension. There is no abdominal tenderness. There is no rebound and no guarding. Hernia confirmed negative in the right inguinal area and confirmed negative in the left inguinal area.     Genitourinary:    Inguinal canal, uterus, right adnexa and left  adnexa normal.   Rectum:      No external hemorrhoid.      Pelvic exam was performed with patient in the lithotomy position.   The external female genitalia was normal.   No external genitalia lesions identified,Genitalia hair distrobution normal .   Labial bartholins normal.There is no rash, tenderness, lesion or injury on the right labia. There is no rash, tenderness, lesion or injury on the left labia. Cervix is normal. Right adnexum displays no mass, no tenderness and no fullness. Left adnexum displays no mass, no tenderness and no fullness. No erythema,  no vaginal discharge, tenderness or bleeding in the vagina.    No foreign body in the vagina.      No signs of injury in the vagina.   Vagina was moist.Cervix exhibits no motion tenderness, no lesion, no discharge, no friability, no lesion, no tenderness and no polyp.    pap smear completedUerus contour normal  Uterus is not enlarged and not tender. Uterus size: 8 cm.Normal urethral meatus.Urethral Meatus exhibits: urethral lesion and prolapsedUrethra findings: no urethral mass, no tenderness and no urethral scarringBladder findings: no bladder distention and no bladder tendernessIUD strings visualized.          Musculoskeletal: Normal range of motion and moves all extremeties.      Lymphadenopathy: No inguinal adenopathy noted on the right or left side.    Neurological: She is alert and oriented to person, place, and time.    Skin: Skin is warm and dry. No rash noted. She is not diaphoretic. No cyanosis or erythema. No pallor. Nails show no clubbing.    Psychiatric: She has a normal mood and affect. Her speech is normal and behavior is normal. Judgment and thought content normal.           Assessment:        1. Encounter for gynecological examination without abnormal finding    2. Encounter for screening for infections with predominantly sexual mode of transmission    3. Vaginal discharge    4. IUD (intrauterine device) in place               Plan:   cx  collected  Vaginal hygiene practices discussed.    Reviewed updated recommendations for pap smears (every 3 years) in low risk patients.  Recommend annual pelvic exams.  Reviewed recommendations for annual CBE.  Next pap due in 2026.   RTC 1 year or sooner prn.  To PCP for other health maintenance.      Encounter for gynecological examination without abnormal finding  -     C. trachomatis/N. gonorrhoeae by AMP DNA  -     Liquid-Based Pap Smear, Screening    Encounter for screening for infections with predominantly sexual mode of transmission  -     C. trachomatis/N. gonorrhoeae by AMP DNA    Vaginal discharge  -     Vaginosis Screen by DNA Probe    IUD (intrauterine device) in place

## 2023-05-30 LAB
C TRACH DNA SPEC QL NAA+PROBE: NOT DETECTED
N GONORRHOEA DNA SPEC QL NAA+PROBE: NOT DETECTED

## 2023-05-31 ENCOUNTER — PATIENT MESSAGE (OUTPATIENT)
Dept: PSYCHIATRY | Facility: CLINIC | Age: 28
End: 2023-05-31
Payer: COMMERCIAL

## 2023-05-31 ENCOUNTER — HOSPITAL ENCOUNTER (OUTPATIENT)
Dept: RADIOLOGY | Facility: HOSPITAL | Age: 28
Discharge: HOME | End: 2023-05-31
Attending: PHYSICIAN ASSISTANT
Payer: COMMERCIAL

## 2023-05-31 ENCOUNTER — OFFICE VISIT (OUTPATIENT)
Dept: NEUROSURGERY | Facility: CLINIC | Age: 28
End: 2023-05-31
Payer: COMMERCIAL

## 2023-05-31 VITALS — SYSTOLIC BLOOD PRESSURE: 110 MMHG | TEMPERATURE: 98 F | HEART RATE: 83 BPM | DIASTOLIC BLOOD PRESSURE: 76 MMHG

## 2023-05-31 DIAGNOSIS — G93.2 IDIOPATHIC INTRACRANIAL HYPERTENSION: Primary | ICD-10-CM

## 2023-05-31 DIAGNOSIS — Z98.2 PRESENCE OF VENTRICULAR SHUNT: ICD-10-CM

## 2023-05-31 DIAGNOSIS — G93.2 IDIOPATHIC INTRACRANIAL HYPERTENSION: ICD-10-CM

## 2023-05-31 LAB
BACTERIAL VAGINOSIS DNA: NEGATIVE
CANDIDA GLABRATA DNA: NEGATIVE
CANDIDA KRUSEI DNA: NEGATIVE
CANDIDA RRNA VAG QL PROBE: NEGATIVE
T VAGINALIS RRNA GENITAL QL PROBE: NEGATIVE

## 2023-05-31 PROCEDURE — 72020 XR SHUNT SERIES: ICD-10-PCS | Mod: 26,,, | Performed by: RADIOLOGY

## 2023-05-31 PROCEDURE — 99214 PR OFFICE/OUTPT VISIT, EST, LEVL IV, 30-39 MIN: ICD-10-PCS | Mod: S$GLB,,, | Performed by: PHYSICIAN ASSISTANT

## 2023-05-31 PROCEDURE — 70250 XR SHUNT SERIES: ICD-10-PCS | Mod: 26,,, | Performed by: RADIOLOGY

## 2023-05-31 PROCEDURE — 71045 XR SHUNT SERIES: ICD-10-PCS | Mod: 26,,, | Performed by: RADIOLOGY

## 2023-05-31 PROCEDURE — 70450 CT HEAD WITHOUT CONTRAST: ICD-10-PCS | Mod: 26,,, | Performed by: RADIOLOGY

## 2023-05-31 PROCEDURE — 62252 CSF SHUNT REPROGRAM: CPT | Mod: S$GLB,,, | Performed by: PHYSICIAN ASSISTANT

## 2023-05-31 PROCEDURE — 99999 PR PBB SHADOW E&M-EST. PATIENT-LVL III: CPT | Mod: PBBFAC,,, | Performed by: PHYSICIAN ASSISTANT

## 2023-05-31 PROCEDURE — 3078F PR MOST RECENT DIASTOLIC BLOOD PRESSURE < 80 MM HG: ICD-10-PCS | Mod: CPTII,S$GLB,, | Performed by: PHYSICIAN ASSISTANT

## 2023-05-31 PROCEDURE — 71045 X-RAY EXAM CHEST 1 VIEW: CPT | Mod: TC

## 2023-05-31 PROCEDURE — 71045 X-RAY EXAM CHEST 1 VIEW: CPT | Mod: 26,,, | Performed by: RADIOLOGY

## 2023-05-31 PROCEDURE — 62252 PR REPROGRAMMING,PROGRAMMABLE CSF SHUNT: ICD-10-PCS | Mod: S$GLB,,, | Performed by: PHYSICIAN ASSISTANT

## 2023-05-31 PROCEDURE — 70544 MR ANGIOGRAPHY HEAD W/O DYE: CPT | Mod: TC

## 2023-05-31 PROCEDURE — 3078F DIAST BP <80 MM HG: CPT | Mod: CPTII,S$GLB,, | Performed by: PHYSICIAN ASSISTANT

## 2023-05-31 PROCEDURE — 99999 PR PBB SHADOW E&M-EST. PATIENT-LVL III: ICD-10-PCS | Mod: PBBFAC,,, | Performed by: PHYSICIAN ASSISTANT

## 2023-05-31 PROCEDURE — 74018 XR SHUNT SERIES: ICD-10-PCS | Mod: 26,,, | Performed by: RADIOLOGY

## 2023-05-31 PROCEDURE — 70544 MRV BRAIN WITHOUT CONTRAST: ICD-10-PCS | Mod: 26,,, | Performed by: RADIOLOGY

## 2023-05-31 PROCEDURE — 70544 MR ANGIOGRAPHY HEAD W/O DYE: CPT | Mod: 26,,, | Performed by: RADIOLOGY

## 2023-05-31 PROCEDURE — 3074F SYST BP LT 130 MM HG: CPT | Mod: CPTII,S$GLB,, | Performed by: PHYSICIAN ASSISTANT

## 2023-05-31 PROCEDURE — 99214 OFFICE O/P EST MOD 30 MIN: CPT | Mod: S$GLB,,, | Performed by: PHYSICIAN ASSISTANT

## 2023-05-31 PROCEDURE — 74018 RADEX ABDOMEN 1 VIEW: CPT | Mod: 26,,, | Performed by: RADIOLOGY

## 2023-05-31 PROCEDURE — 3074F PR MOST RECENT SYSTOLIC BLOOD PRESSURE < 130 MM HG: ICD-10-PCS | Mod: CPTII,S$GLB,, | Performed by: PHYSICIAN ASSISTANT

## 2023-05-31 PROCEDURE — 72020 X-RAY EXAM OF SPINE 1 VIEW: CPT | Mod: 26,,, | Performed by: RADIOLOGY

## 2023-05-31 PROCEDURE — 70450 CT HEAD/BRAIN W/O DYE: CPT | Mod: 26,,, | Performed by: RADIOLOGY

## 2023-05-31 PROCEDURE — 70450 CT HEAD/BRAIN W/O DYE: CPT | Mod: TC

## 2023-05-31 PROCEDURE — 70250 X-RAY EXAM OF SKULL: CPT | Mod: 26,,, | Performed by: RADIOLOGY

## 2023-06-01 ENCOUNTER — TELEPHONE (OUTPATIENT)
Dept: NEUROSURGERY | Facility: CLINIC | Age: 28
End: 2023-06-01
Payer: COMMERCIAL

## 2023-06-01 NOTE — PROGRESS NOTES
Neurosurgery  Established Patient     SUBJECTIVE:     History of Present Illness:  Patient is a 27-year-old female with idiopathic intracranial hypertension status post  shunt (certas 6) placed in 2017 at [facility] who presents today to establish care.  She does not have recent imaging for review for review but she does bring a copy of her last CT head from 2017.  Her preoperative symptoms included pressure headaches, balance difficulty and decline in her vision.  She reports the symptoms significantly improved postoperatively and has done well with her shunt until the past few months.  She now reports headaches have returned with increasing frequency and severity.  She notes new dizziness and ringing in her ears as well as nausea with the headaches.  She states her vision is stable.  Her last Neuro-Ophthalmology evaluation was done in 2021 at [facility].  She will obtain these records for review.  She describes her headaches as pressure and pulsatile.  They are not positional.  She takes ibuprofen and Tylenol without relief.  She does note a recent 40 lb weight loss.  Her Certas valve was adjusted from 5-6 in 2020 for concerns of over shunting.    Interval history:  Patient presents today for follow-up with imaging to evaluate her shunt.  She reports daily headaches increased in frequency since her last visit.  She describes them as pressure-like headaches with pounding.  They are sometimes positional. She was evaluated by our neuro ophthalmologist Dr. Richardson on 05/04.  There was no signs of optic disc edema at that time.  CT head dated 05/31/2023 shows slit-like configuration of the ventricles stable from prior study. Xray shunt series shows continuous shunt tubing without complication; Certas valve at 6.  Patient denies any nausea vomiting, vision changes or other neuro deficits since her last evaluation    Review of patient's allergies indicates:  No Known Allergies    Current Outpatient  Medications   Medication Sig Dispense Refill    buPROPion (WELLBUTRIN XL) 300 MG 24 hr tablet Take 1 tablet (300 mg total) by mouth once daily. 90 tablet 0    busPIRone (BUSPAR) 10 MG tablet Take 1 tablet (10 mg total) by mouth 2 (two) times daily. 180 tablet 0    traZODone (DESYREL) 50 MG tablet Take 0.5-1 tablets (25-50 mg total) by mouth nightly as needed for Insomnia. 90 tablet 0    ARIPiprazole (ABILIFY) 2 MG Tab Take 1 tablet (2 mg total) by mouth once daily. (Patient not taking: Reported on 5/31/2023) 90 tablet 0    diazePAM (VALIUM) 5 MG tablet Take 1 tablet (5 mg total) by mouth 2 (two) times daily as needed for Anxiety. (Patient not taking: Reported on 5/29/2023) 60 tablet 2    GAVILYTE-C 240-22.72-6.72 -5.84 gram SolR Take 4,000 mLs by mouth.      ondansetron (ZOFRAN) 4 MG tablet Take 1 tablet (4 mg total) by mouth every 8 (eight) hours as needed for Nausea. (Patient not taking: Reported on 5/31/2023) 30 tablet 0    pantoprazole (PROTONIX) 40 MG tablet Take 1 tablet (40 mg total) by mouth once daily. (Patient not taking: Reported on 5/31/2023) 30 tablet 3     No current facility-administered medications for this visit.       Past Medical History:   Diagnosis Date    Anxiety     Bipolar disorder     Depression     Idiopathic intracranial hypertension     Insomnia      Past Surgical History:   Procedure Laterality Date    brain shunt      eye sheath finistration Bilateral      Family History       Problem Relation (Age of Onset)    Depression Maternal Grandmother    Diabetes Maternal Grandmother    Heart disease Mother, Maternal Grandfather    Thyroid disease Maternal Grandmother          Social History     Socioeconomic History    Marital status: Single   Tobacco Use    Smoking status: Never    Smokeless tobacco: Never   Substance and Sexual Activity    Alcohol use: Yes     Comment: Drinks socially    Drug use: Never    Sexual activity: Yes     Partners: Female, Male     Birth control/protection: I.U.D.        Review of Systems   Gastrointestinal:  Positive for nausea. Negative for vomiting.   Musculoskeletal:  Positive for gait problem.   Neurological:  Positive for dizziness and headaches. Negative for seizures, weakness and numbness.   All other systems reviewed and are negative.    OBJECTIVE:     Vital Signs  Temp: 98.1 °F (36.7 °C)  Pulse: 83  BP: 110/76  Pain Score:   7  There is no height or weight on file to calculate BMI.      Neurosurgery Physical Exam  General: well developed, well nourished, no distress.   Head: normocephalic, atraumatic  Neurologic: Alert and oriented. Thought content appropriate.  GCS: Motor: 6/Verbal: 5/Eyes: 4 GCS Total: 15  Mental Status: Awake, Alert, Oriented x3  Cranial nerves: face symmetric, tongue midline, CN II-XII grossly intact.   Eyes: pupils equal, round, reactive to light with accomodation, EOMI.   Sensory: intact to light touch throughout  Motor Strength: Moves all extremities spontaneously with good tone.  Full strength upper and lower extremities. No abnormal movements seen.   DTR's - 2 + and symmetric in UE and LE  Pronator Drift: no drift noted  Finger-to-nose: Intact bilaterally  Morgan: absent  Clonus: absent  Gait: normal      Diagnostic Results:  CTH and shunt series dated 5/31/23 as above    MRV Brain without contrast dated 05/31/2023 without evidence for dural venous sinus thrombosis.     ASSESSMENT/PLAN:     27-year-old female with IIH status post  shunt (Certas 6) who presents today with complaint of worsening headaches.  She did have recent weight loss. Her ventricles are slit and she notes some positional quality to her HA. I will adjust her valve to 7 today for concern for overshunting.  I will plan to see her back in 4-6 weeks for close surveillance. She was encouraged to call us with any new or worsening symptoms in the interim.      Claudia Araujo PA-C  Neurosurgery          Note dictated with voice recognition software, please excuse any  grammatical errors.

## 2023-06-02 ENCOUNTER — TELEPHONE (OUTPATIENT)
Dept: PREADMISSION TESTING | Facility: HOSPITAL | Age: 28
End: 2023-06-02
Payer: COMMERCIAL

## 2023-06-02 LAB
FINAL PATHOLOGIC DIAGNOSIS: NORMAL
Lab: NORMAL

## 2023-06-02 NOTE — TELEPHONE ENCOUNTER
----- Message from Claudia Araujo PA-C sent at 6/2/2023  3:43 PM CDT -----  Regarding: RE: pre op clearance  Ok to proceed thanks!  ----- Message -----  From: Tiny Waters RN  Sent: 6/2/2023   2:22 PM CDT  To: Claudia Araujo PA-C, [facility] Staff  Subject: pre op clearance                                 This patient is scheduled for a colonoscopy and EGD on 6/6/23 with MAC anesthesia. I saw she recently had some shunt studies and CT due to increase in headaches. From a neurology standpoint, is ok to proceed with this procedure or should we postpone? Please advise. Thanks!!

## 2023-07-05 ENCOUNTER — PATIENT MESSAGE (OUTPATIENT)
Dept: NEUROSURGERY | Facility: CLINIC | Age: 28
End: 2023-07-05
Payer: COMMERCIAL

## 2023-07-11 ENCOUNTER — PATIENT MESSAGE (OUTPATIENT)
Dept: RESEARCH | Facility: HOSPITAL | Age: 28
End: 2023-07-11
Payer: COMMERCIAL

## 2023-07-18 ENCOUNTER — OFFICE VISIT (OUTPATIENT)
Dept: NEUROSURGERY | Facility: CLINIC | Age: 28
End: 2023-07-18
Payer: COMMERCIAL

## 2023-07-18 VITALS — WEIGHT: 232 LBS | HEIGHT: 67 IN | BODY MASS INDEX: 36.41 KG/M2

## 2023-07-18 DIAGNOSIS — G93.2 IDIOPATHIC INTRACRANIAL HYPERTENSION: Primary | ICD-10-CM

## 2023-07-18 PROCEDURE — 62252 PR REPROGRAMMING,PROGRAMMABLE CSF SHUNT: ICD-10-PCS | Mod: S$GLB,,, | Performed by: PHYSICIAN ASSISTANT

## 2023-07-18 PROCEDURE — 62252 CSF SHUNT REPROGRAM: CPT | Mod: S$GLB,,, | Performed by: PHYSICIAN ASSISTANT

## 2023-07-18 PROCEDURE — 99999 PR PBB SHADOW E&M-EST. PATIENT-LVL III: CPT | Mod: PBBFAC,,, | Performed by: PHYSICIAN ASSISTANT

## 2023-07-18 PROCEDURE — 99214 OFFICE O/P EST MOD 30 MIN: CPT | Mod: S$GLB,,, | Performed by: PHYSICIAN ASSISTANT

## 2023-07-18 PROCEDURE — 3008F BODY MASS INDEX DOCD: CPT | Mod: CPTII,S$GLB,, | Performed by: PHYSICIAN ASSISTANT

## 2023-07-18 PROCEDURE — 99999 PR PBB SHADOW E&M-EST. PATIENT-LVL III: ICD-10-PCS | Mod: PBBFAC,,, | Performed by: PHYSICIAN ASSISTANT

## 2023-07-18 PROCEDURE — 3008F PR BODY MASS INDEX (BMI) DOCUMENTED: ICD-10-PCS | Mod: CPTII,S$GLB,, | Performed by: PHYSICIAN ASSISTANT

## 2023-07-18 PROCEDURE — 99214 PR OFFICE/OUTPT VISIT, EST, LEVL IV, 30-39 MIN: ICD-10-PCS | Mod: S$GLB,,, | Performed by: PHYSICIAN ASSISTANT

## 2023-07-18 NOTE — PROGRESS NOTES
Neurosurgery  Established Patient     SUBJECTIVE:     History of Present Illness 4/13/23:  Patient is a 27-year-old female with idiopathic intracranial hypertension status post  shunt (certas 6) placed in 2017 at [facility] who presents today to establish care.  She does not have recent imaging for review for review but she does bring a copy of her last CT head from 2017.  Her preoperative symptoms included pressure headaches, balance difficulty and decline in her vision.  She reports the symptoms significantly improved postoperatively and has done well with her shunt until the past few months.  She now reports headaches have returned with increasing frequency and severity.  She notes new dizziness and ringing in her ears as well as nausea with the headaches.  She states her vision is stable.  Her last Neuro-Ophthalmology evaluation was done in 2021 at [facility].  She will obtain these records for review.  She describes her headaches as pressure and pulsatile.  They are not positional.  She takes ibuprofen and Tylenol without relief.  She does note a recent 40 lb weight loss.  Her Certas valve was adjusted from 5-6 in 2020 for concerns of over shunting.    Interval history 6/1/23:  Patient presents today for follow-up with imaging to evaluate her shunt.  She reports daily headaches increased in frequency since her last visit.  She describes them as pressure-like headaches with pounding.  They are sometimes positional. She was evaluated by our neuro ophthalmologist Dr. Richardson on 05/04.  There was no signs of optic disc edema at that time.  CT head dated 05/31/2023 shows slit-like configuration of the ventricles stable from prior study. Xray shunt series shows continuous shunt tubing without complication; Certas valve at 6.  Patient denies any nausea vomiting, vision changes or other neuro deficits since her last evaluation    Interval history:  Pt represents today for follow up after her  shunt was adjusted last visit. She reports that her HA are more frequent since her shunt adjustment last minute from Certas 6 to 7. Her shunt was adjusted due to concern for overshunting with slit like ventricles. She denies change in intensity. She feels that she did the best at a setting of 6. She denies vision changes, N/V or other new symptoms.    Review of patient's allergies indicates:  No Known Allergies    Current Outpatient Medications   Medication Sig Dispense Refill    buPROPion (WELLBUTRIN XL) 300 MG 24 hr tablet Take 1 tablet (300 mg total) by mouth once daily. 90 tablet 0    ondansetron (ZOFRAN) 4 MG tablet Take 1 tablet (4 mg total) by mouth every 8 (eight) hours as needed for Nausea. 30 tablet 0    ARIPiprazole (ABILIFY) 2 MG Tab Take 1 tablet (2 mg total) by mouth once daily. 90 tablet 0    busPIRone (BUSPAR) 15 MG tablet Take 1 tablet (15 mg total) by mouth 2 (two) times daily. 180 tablet 0    diazePAM (VALIUM) 5 MG tablet Take 1 tablet (5 mg total) by mouth 2 (two) times daily as needed for Anxiety. 60 tablet 2    traZODone (DESYREL) 50 MG tablet Take 0.5-1 tablets (25-50 mg total) by mouth nightly as needed for Insomnia. 90 tablet 0     No current facility-administered medications for this visit.       Past Medical History:   Diagnosis Date    Anxiety     Bipolar disorder     Depression     Idiopathic intracranial hypertension     Insomnia      Past Surgical History:   Procedure Laterality Date    brain shunt      eye sheath finistration Bilateral      Family History       Problem Relation (Age of Onset)    Depression Maternal Grandmother    Diabetes Maternal Grandmother    Heart disease Mother, Maternal Grandfather    Thyroid disease Maternal Grandmother          Social History     Socioeconomic History    Marital status: Single   Tobacco Use    Smoking status: Never    Smokeless tobacco: Never   Substance and Sexual Activity    Alcohol use: Yes     Comment: Drinks socially    Drug use: Never     "Sexual activity: Yes     Partners: Female, Male     Birth control/protection: I.U.D.       OBJECTIVE:     Vital Signs  Pain Score:   4  Height: 5' 7.01" (170.2 cm)  Weight: 105.2 kg (231 lb 15.8 oz)  Body mass index is 36.33 kg/m².      Neurosurgery Physical Exam  General: well developed, well nourished, no distress.   Head: normocephalic, atraumatic  Neurologic: Alert and oriented. Thought content appropriate.  GCS: Motor: 6/Verbal: 5/Eyes: 4 GCS Total: 15  Mental Status: Awake, Alert, Oriented x3  Cranial nerves: face symmetric, tongue midline, CN II-XII grossly intact.   Eyes: pupils equal, round, reactive to light with accomodation, EOMI.   Sensory: intact to light touch throughout  Motor Strength: Moves all extremities spontaneously with good tone.  Full strength upper and lower extremities. No abnormal movements seen.   DTR's - 2 + and symmetric in UE and LE  Pronator Drift: no drift noted  Finger-to-nose: Intact bilaterally  Morgan: absent  Clonus: absent  Gait: normal      Diagnostic Results:  None new.     ASSESSMENT/PLAN:     27-year-old female with IIH status post  shunt (Certas 6) who presents today with complaint of worsening headaches since her shunt was reset to 7.  I will adjust her back to 6 today and have her evaluated by Headache Neurology. We will see her back in 3-6 months or sooner with any new symptoms.       Claudia Araujo PA-C  Neurosurgery      Note dictated with voice recognition software, please excuse any grammatical errors.  "

## 2023-07-19 ENCOUNTER — PATIENT MESSAGE (OUTPATIENT)
Dept: RESEARCH | Facility: HOSPITAL | Age: 28
End: 2023-07-19
Payer: COMMERCIAL

## 2023-07-24 ENCOUNTER — OFFICE VISIT (OUTPATIENT)
Dept: PSYCHIATRY | Facility: CLINIC | Age: 28
End: 2023-07-24
Payer: COMMERCIAL

## 2023-07-24 VITALS — HEART RATE: 72 BPM | DIASTOLIC BLOOD PRESSURE: 65 MMHG | SYSTOLIC BLOOD PRESSURE: 94 MMHG

## 2023-07-24 DIAGNOSIS — F41.0 GENERALIZED ANXIETY DISORDER WITH PANIC ATTACKS: ICD-10-CM

## 2023-07-24 DIAGNOSIS — F51.05 INSOMNIA DUE TO MENTAL CONDITION: ICD-10-CM

## 2023-07-24 DIAGNOSIS — Z91.52 PERSONAL HISTORY OF NONSUICIDAL SELF-INJURY: ICD-10-CM

## 2023-07-24 DIAGNOSIS — Z87.828 HISTORY OF TRAUMA: ICD-10-CM

## 2023-07-24 DIAGNOSIS — F31.32 BIPOLAR 1 DISORDER, DEPRESSED, MODERATE: Primary | ICD-10-CM

## 2023-07-24 DIAGNOSIS — F41.1 GENERALIZED ANXIETY DISORDER WITH PANIC ATTACKS: ICD-10-CM

## 2023-07-24 PROCEDURE — 3078F DIAST BP <80 MM HG: CPT | Mod: CPTII,S$GLB,, | Performed by: PSYCHOLOGIST

## 2023-07-24 PROCEDURE — 3074F SYST BP LT 130 MM HG: CPT | Mod: CPTII,S$GLB,, | Performed by: PSYCHOLOGIST

## 2023-07-24 PROCEDURE — 99214 OFFICE O/P EST MOD 30 MIN: CPT | Mod: S$GLB,,, | Performed by: PSYCHOLOGIST

## 2023-07-24 PROCEDURE — 99999 PR PBB SHADOW E&M-EST. PATIENT-LVL II: CPT | Mod: PBBFAC,,, | Performed by: PSYCHOLOGIST

## 2023-07-24 PROCEDURE — 99999 PR PBB SHADOW E&M-EST. PATIENT-LVL II: ICD-10-PCS | Mod: PBBFAC,,, | Performed by: PSYCHOLOGIST

## 2023-07-24 PROCEDURE — 99214 PR OFFICE/OUTPT VISIT, EST, LEVL IV, 30-39 MIN: ICD-10-PCS | Mod: S$GLB,,, | Performed by: PSYCHOLOGIST

## 2023-07-24 PROCEDURE — 3074F PR MOST RECENT SYSTOLIC BLOOD PRESSURE < 130 MM HG: ICD-10-PCS | Mod: CPTII,S$GLB,, | Performed by: PSYCHOLOGIST

## 2023-07-24 PROCEDURE — 3078F PR MOST RECENT DIASTOLIC BLOOD PRESSURE < 80 MM HG: ICD-10-PCS | Mod: CPTII,S$GLB,, | Performed by: PSYCHOLOGIST

## 2023-07-24 RX ORDER — BUSPIRONE HYDROCHLORIDE 15 MG/1
15 TABLET ORAL 2 TIMES DAILY
Qty: 180 TABLET | Refills: 0 | Status: SHIPPED | OUTPATIENT
Start: 2023-07-24 | End: 2023-08-23 | Stop reason: SDUPTHER

## 2023-07-24 RX ORDER — ARIPIPRAZOLE 2 MG/1
2 TABLET ORAL DAILY
Qty: 90 TABLET | Refills: 0 | Status: SHIPPED | OUTPATIENT
Start: 2023-07-24 | End: 2023-10-12

## 2023-07-24 RX ORDER — TRAZODONE HYDROCHLORIDE 50 MG/1
25-50 TABLET ORAL NIGHTLY PRN
Qty: 90 TABLET | Refills: 0 | Status: SHIPPED | OUTPATIENT
Start: 2023-07-24 | End: 2023-10-12 | Stop reason: SDUPTHER

## 2023-07-24 RX ORDER — DIAZEPAM 5 MG/1
5 TABLET ORAL 2 TIMES DAILY PRN
Qty: 60 TABLET | Refills: 2 | Status: SHIPPED | OUTPATIENT
Start: 2023-07-24 | End: 2023-10-12 | Stop reason: SDUPTHER

## 2023-07-24 NOTE — PROGRESS NOTES
"MEDICATION MANAGEMENT SESSION    Name: Rafaela Zuniga  Age: 27 y.o.  : 1995    Preferred Name: Rafaela     Referring provider: Brenda Acosta PA-C    Reason for Visit:  Medication follow up    Summary of Visit:  Pt reports that Abilify 2mg qd has been "very helpful" since last visit; her mood has been better, more stable with exception of some hypomanic symptoms such as being outgoing and hyper-verbal more than usual at some points. She is sleeping well with Trazodone 50mg qhs prn. She takes Valium 5mg prn sparingly. Discussed increasing Buspar for anxiety. She has started seeing Matt Hernandez LPC for counseling.    Current Symptoms:   ADHD: denied   Depressive Disorder: appetite/weight change, tired/fatigued   Anxiety Disorder: anxiety/nervousness, fatigue, excessive worry   Panic Disorder: nervous   Manic Disorder: increased distractability, hyperverbal   Psychotic Disorder: denied   Substance Use:  Alcohol: Socially once per month at most, 1-2 glasses of wine.     Review of Systems   Constitutional:  Positive for fatigue. Negative for activity change, appetite change and unexpected weight change.   Respiratory:  Negative for shortness of breath.    Neurological:  Positive for headaches.   Psychiatric/Behavioral:  Negative for agitation, behavioral problems, confusion, decreased concentration, dysphoric mood, hallucinations, self-injury, sleep disturbance and suicidal ideas. The patient is nervous/anxious. The patient is not hyperactive.       Constitutional:  Vitals:  Most recent vital signs were reviewed.   Last 3 sets of Vitals    Vitals - 1 value per visit 2023   SYSTOLIC - - 94   DIASTOLIC - - 65   Pulse - - 72   Temp - - -   SPO2 - - -   Weight (lb) - 231.99 -   Weight (kg) - 105.23 -   Height - 67.008 -   BMI (Calculated) - 36.3 -   VISIT REPORT - - -   Pain Score  4 - -          Psychiatric:  Oriented: x 3   Attitude: cooperative   Eye Contact: good   Behavior: wnl " "  Mood: "good"  Affect: appropriate range   Attention: intact   Concentration: grossly intact   Thought Process: goal directed   Speech: intelligible  Volume: WNL   Quantity: WNL   Rhythm: WNL  Insight: fair to good   Threats: no SI / HI   Memory: Grossly intact  Psychosis: denies all   Estimate of Intellectual Function: average   Judgment:  fair to good  Relevant Elements of Neurological Exam: limp due to broken foot.    Allergy Review:   Review of patient's allergies indicates:  No Known Allergies     Medical Problem List:   Patient Active Problem List   Diagnosis    Bipolar disorder, in full remission, most recent episode mixed    Idiopathic intracranial hypertension    Presence of ventricular shunt    IUD (intrauterine device) in place      Encounter Diagnoses   Name Primary?    Bipolar 1 disorder, depressed, moderate Yes    Insomnia due to mental condition     Personal history of nonsuicidal self-injury     Generalized anxiety disorder with panic attacks     History of trauma       PLAN:  Medication Management: Continue current medications. Discussed risks, benefits, and alternatives to treatment plan documented above with patient. I answered all patient questions related to this plan, and patient expressed understanding and agreement.      Follow up in about 2 months (around 9/24/2023) for Medication follow up.     Medication List with Changes/Refills   Current Medications    BUPROPION (WELLBUTRIN XL) 300 MG 24 HR TABLET    Take 1 tablet (300 mg total) by mouth once daily.    ONDANSETRON (ZOFRAN) 4 MG TABLET    Take 1 tablet (4 mg total) by mouth every 8 (eight) hours as needed for Nausea.   Changed and/or Refilled Medications    Modified Medication Previous Medication    ARIPIPRAZOLE (ABILIFY) 2 MG TAB ARIPiprazole (ABILIFY) 2 MG Tab       Take 1 tablet (2 mg total) by mouth once daily.    Take 1 tablet (2 mg total) by mouth once daily.    BUSPIRONE (BUSPAR) 15 MG TABLET busPIRone (BUSPAR) 10 MG tablet       " Take 1 tablet (15 mg total) by mouth 2 (two) times daily.    Take 1 tablet (10 mg total) by mouth 2 (two) times daily.    DIAZEPAM (VALIUM) 5 MG TABLET diazePAM (VALIUM) 5 MG tablet       Take 1 tablet (5 mg total) by mouth 2 (two) times daily as needed for Anxiety.    Take 1 tablet (5 mg total) by mouth 2 (two) times daily as needed for Anxiety.    TRAZODONE (DESYREL) 50 MG TABLET traZODone (DESYREL) 50 MG tablet       Take 0.5-1 tablets (25-50 mg total) by mouth nightly as needed for Insomnia.    Take 0.5-1 tablets (25-50 mg total) by mouth nightly as needed for Insomnia.   Discontinued Medications    GAVILYTE-C [id number] -5.84 GRAM SOLR    Take 4,000 mLs by mouth.    PANTOPRAZOLE (PROTONIX) 40 MG TABLET    Take 1 tablet (40 mg total) by mouth once daily.      Time spent with pt including note preparation: 30 minutes     Jessica Warner, PhD, MP  Medical Psychologist

## 2023-07-25 NOTE — PROGRESS NOTES
Procedure:  Shunt reprogramming  Indication: IIH     The Certas  was placed over the shunt valve, and the setting was reset to 6 mm of H2O. The patient tolerated this well, with no complications. She was informed to call the clinic with any further questions or concerns in the meantime.      Claudia Araujo PA-C  Neurosurgery

## 2023-07-26 ENCOUNTER — PATIENT MESSAGE (OUTPATIENT)
Dept: PREADMISSION TESTING | Facility: HOSPITAL | Age: 28
End: 2023-07-26
Payer: COMMERCIAL

## 2023-08-01 ENCOUNTER — OFFICE VISIT (OUTPATIENT)
Dept: INTERNAL MEDICINE | Facility: CLINIC | Age: 28
End: 2023-08-01
Payer: COMMERCIAL

## 2023-08-01 VITALS
SYSTOLIC BLOOD PRESSURE: 116 MMHG | WEIGHT: 235.69 LBS | TEMPERATURE: 98 F | BODY MASS INDEX: 36.99 KG/M2 | HEIGHT: 67 IN | HEART RATE: 92 BPM | OXYGEN SATURATION: 100 % | DIASTOLIC BLOOD PRESSURE: 64 MMHG | RESPIRATION RATE: 18 BRPM

## 2023-08-01 DIAGNOSIS — Z13.220 SCREENING FOR LIPOID DISORDERS: ICD-10-CM

## 2023-08-01 DIAGNOSIS — E66.9 OBESITY (BMI 30-39.9): ICD-10-CM

## 2023-08-01 DIAGNOSIS — Z11.59 NEED FOR HEPATITIS C SCREENING TEST: ICD-10-CM

## 2023-08-01 DIAGNOSIS — Z00.00 ROUTINE GENERAL MEDICAL EXAMINATION AT A HEALTH CARE FACILITY: Primary | ICD-10-CM

## 2023-08-01 DIAGNOSIS — Z13.29 THYROID DISORDER SCREEN: ICD-10-CM

## 2023-08-01 DIAGNOSIS — Z11.4 SCREENING FOR HIV (HUMAN IMMUNODEFICIENCY VIRUS): ICD-10-CM

## 2023-08-01 PROCEDURE — 99999 PR PBB SHADOW E&M-EST. PATIENT-LVL IV: ICD-10-PCS | Mod: PBBFAC,,, | Performed by: FAMILY MEDICINE

## 2023-08-01 PROCEDURE — 3074F SYST BP LT 130 MM HG: CPT | Mod: CPTII,S$GLB,, | Performed by: FAMILY MEDICINE

## 2023-08-01 PROCEDURE — 1160F PR REVIEW ALL MEDS BY PRESCRIBER/CLIN PHARMACIST DOCUMENTED: ICD-10-PCS | Mod: CPTII,S$GLB,, | Performed by: FAMILY MEDICINE

## 2023-08-01 PROCEDURE — 1159F PR MEDICATION LIST DOCUMENTED IN MEDICAL RECORD: ICD-10-PCS | Mod: CPTII,S$GLB,, | Performed by: FAMILY MEDICINE

## 2023-08-01 PROCEDURE — 3078F DIAST BP <80 MM HG: CPT | Mod: CPTII,S$GLB,, | Performed by: FAMILY MEDICINE

## 2023-08-01 PROCEDURE — 99999 PR PBB SHADOW E&M-EST. PATIENT-LVL IV: CPT | Mod: PBBFAC,,, | Performed by: FAMILY MEDICINE

## 2023-08-01 PROCEDURE — 1159F MED LIST DOCD IN RCRD: CPT | Mod: CPTII,S$GLB,, | Performed by: FAMILY MEDICINE

## 2023-08-01 PROCEDURE — 99395 PR PREVENTIVE VISIT,EST,18-39: ICD-10-PCS | Mod: S$GLB,,, | Performed by: FAMILY MEDICINE

## 2023-08-01 PROCEDURE — 3008F PR BODY MASS INDEX (BMI) DOCUMENTED: ICD-10-PCS | Mod: CPTII,S$GLB,, | Performed by: FAMILY MEDICINE

## 2023-08-01 PROCEDURE — 3008F BODY MASS INDEX DOCD: CPT | Mod: CPTII,S$GLB,, | Performed by: FAMILY MEDICINE

## 2023-08-01 PROCEDURE — 1160F RVW MEDS BY RX/DR IN RCRD: CPT | Mod: CPTII,S$GLB,, | Performed by: FAMILY MEDICINE

## 2023-08-01 PROCEDURE — 3078F PR MOST RECENT DIASTOLIC BLOOD PRESSURE < 80 MM HG: ICD-10-PCS | Mod: CPTII,S$GLB,, | Performed by: FAMILY MEDICINE

## 2023-08-01 PROCEDURE — 99395 PREV VISIT EST AGE 18-39: CPT | Mod: S$GLB,,, | Performed by: FAMILY MEDICINE

## 2023-08-01 PROCEDURE — 3074F PR MOST RECENT SYSTOLIC BLOOD PRESSURE < 130 MM HG: ICD-10-PCS | Mod: CPTII,S$GLB,, | Performed by: FAMILY MEDICINE

## 2023-08-01 NOTE — PROGRESS NOTES
Subjective:       Patient ID: Rafaela Zuniga is a 27 y.o. female.    Chief Complaint: Follow-up    Patient presents to clinic today for establish care physical.        Review of Systems   Constitutional:  Negative for chills, fatigue, fever and unexpected weight change.   HENT:  Negative for congestion, dental problem, ear pain, hearing loss, rhinorrhea and trouble swallowing.    Eyes:  Negative for pain and visual disturbance.   Respiratory:  Negative for cough and shortness of breath.    Cardiovascular:  Negative for chest pain, palpitations and leg swelling.   Gastrointestinal:  Negative for abdominal distention, abdominal pain, blood in stool, constipation, diarrhea, nausea and vomiting.   Genitourinary:  Negative for difficulty urinating and vaginal discharge.   Musculoskeletal:  Negative for arthralgias and myalgias.   Skin:  Negative for rash.   Neurological:  Negative for dizziness, weakness, numbness and headaches.   Hematological:  Negative for adenopathy. Does not bruise/bleed easily.   Psychiatric/Behavioral:  Negative for dysphoric mood and sleep disturbance. The patient is not nervous/anxious.          Objective:      Physical Exam  Vitals reviewed.   Constitutional:       General: She is not in acute distress.     Appearance: Normal appearance. She is well-developed.   HENT:      Head: Normocephalic and atraumatic.      Right Ear: Tympanic membrane, ear canal and external ear normal.      Left Ear: Tympanic membrane, ear canal and external ear normal.      Nose: Nose normal. No mucosal edema or rhinorrhea.      Mouth/Throat:      Pharynx: Uvula midline.   Eyes:      General: Lids are normal. No scleral icterus.     Extraocular Movements: Extraocular movements intact.      Conjunctiva/sclera: Conjunctivae normal.      Pupils: Pupils are equal, round, and reactive to light.   Neck:      Thyroid: No thyromegaly.   Cardiovascular:      Rate and Rhythm: Normal rate and regular rhythm.      Heart sounds:  No murmur heard.     No friction rub. No gallop.   Pulmonary:      Effort: Pulmonary effort is normal.      Breath sounds: Normal breath sounds. No wheezing, rhonchi or rales.   Abdominal:      General: Bowel sounds are normal. There is no distension.      Palpations: Abdomen is soft. There is no mass.      Tenderness: There is no abdominal tenderness.   Musculoskeletal:         General: Normal range of motion.      Cervical back: Normal range of motion and neck supple.   Lymphadenopathy:      Cervical: No cervical adenopathy.   Skin:     General: Skin is warm and dry.      Findings: No lesion or rash.      Nails: There is no clubbing.   Neurological:      Mental Status: She is alert and oriented to person, place, and time.      Cranial Nerves: No cranial nerve deficit.      Sensory: No sensory deficit.      Gait: Gait normal.   Psychiatric:         Mood and Affect: Mood and affect normal.         Assessment:       1. Routine general medical examination at a health care facility    2. Obesity (BMI 30-39.9)    3. Screening for lipoid disorders    4. Thyroid disorder screen    5. Need for hepatitis C screening test    6. Screening for HIV (human immunodeficiency virus)        Plan:   1. Routine general medical examination at a health care facility  -     Comprehensive Metabolic Panel; Future; Expected date: 08/01/2023  -     CBC Auto Differential; Future; Expected date: 08/01/2023    2. Obesity (BMI 30-39.9)  -     Ambulatory referral/consult to Lifestyle and Wellness; Future; Expected date: 08/08/2023    3. Screening for lipoid disorders  -     Lipid Panel; Future; Expected date: 08/01/2023    4. Thyroid disorder screen  -     TSH; Future; Expected date: 08/01/2023    5. Need for hepatitis C screening test  -     Hepatitis C Antibody; Future; Expected date: 08/01/2023    6. Screening for HIV (human immunodeficiency virus)  -     HIV 1/2 Ag/Ab (4th Gen); Future; Expected date: 08/01/2023        Health Maintenance  reviewed/updated.  Patient will send immunization records via [employer] so I can update her record.

## 2023-08-03 ENCOUNTER — PATIENT MESSAGE (OUTPATIENT)
Dept: INTERNAL MEDICINE | Facility: CLINIC | Age: 28
End: 2023-08-03
Payer: COMMERCIAL

## 2023-08-04 ENCOUNTER — PATIENT MESSAGE (OUTPATIENT)
Dept: NEUROSURGERY | Facility: CLINIC | Age: 28
End: 2023-08-04
Payer: COMMERCIAL

## 2023-08-04 DIAGNOSIS — Z98.2 PRESENCE OF VENTRICULAR SHUNT: ICD-10-CM

## 2023-08-04 DIAGNOSIS — G93.2 IDIOPATHIC INTRACRANIAL HYPERTENSION: Primary | ICD-10-CM

## 2023-08-05 ENCOUNTER — LAB VISIT (OUTPATIENT)
Dept: LAB | Facility: HOSPITAL | Age: 28
End: 2023-08-05
Attending: FAMILY MEDICINE
Payer: COMMERCIAL

## 2023-08-05 DIAGNOSIS — Z00.00 ROUTINE GENERAL MEDICAL EXAMINATION AT A HEALTH CARE FACILITY: ICD-10-CM

## 2023-08-05 DIAGNOSIS — Z11.59 NEED FOR HEPATITIS C SCREENING TEST: ICD-10-CM

## 2023-08-05 DIAGNOSIS — Z13.220 SCREENING FOR LIPOID DISORDERS: ICD-10-CM

## 2023-08-05 DIAGNOSIS — Z13.29 THYROID DISORDER SCREEN: ICD-10-CM

## 2023-08-05 DIAGNOSIS — Z11.4 SCREENING FOR HIV (HUMAN IMMUNODEFICIENCY VIRUS): ICD-10-CM

## 2023-08-05 LAB
ALBUMIN SERPL BCP-MCNC: 3.6 G/DL (ref 3.5–5.2)
ALP SERPL-CCNC: 78 U/L (ref 55–135)
ALT SERPL W/O P-5'-P-CCNC: 16 U/L (ref 10–44)
ANION GAP SERPL CALC-SCNC: 12 MMOL/L (ref 8–16)
AST SERPL-CCNC: 20 U/L (ref 10–40)
BASOPHILS # BLD AUTO: 0.04 K/UL (ref 0–0.2)
BASOPHILS NFR BLD: 0.5 % (ref 0–1.9)
BILIRUB SERPL-MCNC: 0.2 MG/DL (ref 0.1–1)
BUN SERPL-MCNC: 13 MG/DL (ref 6–20)
CALCIUM SERPL-MCNC: 9.2 MG/DL (ref 8.7–10.5)
CHLORIDE SERPL-SCNC: 105 MMOL/L (ref 95–110)
CHOLEST SERPL-MCNC: 215 MG/DL (ref 120–199)
CHOLEST/HDLC SERPL: 3.8 {RATIO} (ref 2–5)
CO2 SERPL-SCNC: 24 MMOL/L (ref 23–29)
CREAT SERPL-MCNC: 0.9 MG/DL (ref 0.5–1.4)
DIFFERENTIAL METHOD: ABNORMAL
EOSINOPHIL # BLD AUTO: 0.2 K/UL (ref 0–0.5)
EOSINOPHIL NFR BLD: 2 % (ref 0–8)
ERYTHROCYTE [DISTWIDTH] IN BLOOD BY AUTOMATED COUNT: 13.3 % (ref 11.5–14.5)
EST. GFR  (NO RACE VARIABLE): >60 ML/MIN/1.73 M^2
GLUCOSE SERPL-MCNC: 76 MG/DL (ref 70–110)
HCT VFR BLD AUTO: 39.1 % (ref 37–48.5)
HCV AB SERPL QL IA: NORMAL
HDLC SERPL-MCNC: 56 MG/DL (ref 40–75)
HDLC SERPL: 26 % (ref 20–50)
HGB BLD-MCNC: 12.3 G/DL (ref 12–16)
HIV 1+2 AB+HIV1 P24 AG SERPL QL IA: NORMAL
IMM GRANULOCYTES # BLD AUTO: 0.03 K/UL (ref 0–0.04)
IMM GRANULOCYTES NFR BLD AUTO: 0.4 % (ref 0–0.5)
LDLC SERPL CALC-MCNC: 145.6 MG/DL (ref 63–159)
LYMPHOCYTES # BLD AUTO: 1.5 K/UL (ref 1–4.8)
LYMPHOCYTES NFR BLD: 18.5 % (ref 18–48)
MCH RBC QN AUTO: 28.2 PG (ref 27–31)
MCHC RBC AUTO-ENTMCNC: 31.5 G/DL (ref 32–36)
MCV RBC AUTO: 90 FL (ref 82–98)
MONOCYTES # BLD AUTO: 0.4 K/UL (ref 0.3–1)
MONOCYTES NFR BLD: 5.3 % (ref 4–15)
NEUTROPHILS # BLD AUTO: 6 K/UL (ref 1.8–7.7)
NEUTROPHILS NFR BLD: 73.3 % (ref 38–73)
NONHDLC SERPL-MCNC: 159 MG/DL
NRBC BLD-RTO: 0 /100 WBC
PLATELET # BLD AUTO: 409 K/UL (ref 150–450)
PMV BLD AUTO: 10.2 FL (ref 9.2–12.9)
POTASSIUM SERPL-SCNC: 4.2 MMOL/L (ref 3.5–5.1)
PROT SERPL-MCNC: 7.2 G/DL (ref 6–8.4)
RBC # BLD AUTO: 4.36 M/UL (ref 4–5.4)
SODIUM SERPL-SCNC: 141 MMOL/L (ref 136–145)
TRIGL SERPL-MCNC: 67 MG/DL (ref 30–150)
TSH SERPL DL<=0.005 MIU/L-ACNC: 1.34 UIU/ML (ref 0.4–4)
WBC # BLD AUTO: 8.16 K/UL (ref 3.9–12.7)

## 2023-08-05 PROCEDURE — 86803 HEPATITIS C AB TEST: CPT | Performed by: FAMILY MEDICINE

## 2023-08-05 PROCEDURE — 84443 ASSAY THYROID STIM HORMONE: CPT | Performed by: FAMILY MEDICINE

## 2023-08-05 PROCEDURE — 80053 COMPREHEN METABOLIC PANEL: CPT | Performed by: FAMILY MEDICINE

## 2023-08-05 PROCEDURE — 36415 COLL VENOUS BLD VENIPUNCTURE: CPT | Performed by: FAMILY MEDICINE

## 2023-08-05 PROCEDURE — 80061 LIPID PANEL: CPT | Performed by: FAMILY MEDICINE

## 2023-08-05 PROCEDURE — 85025 COMPLETE CBC W/AUTO DIFF WBC: CPT | Performed by: FAMILY MEDICINE

## 2023-08-05 PROCEDURE — 87389 HIV-1 AG W/HIV-1&-2 AB AG IA: CPT | Performed by: FAMILY MEDICINE

## 2023-08-07 DIAGNOSIS — R11.0 CHRONIC NAUSEA: Primary | ICD-10-CM

## 2023-08-07 DIAGNOSIS — R10.9 CHRONIC ABDOMINAL PAIN: ICD-10-CM

## 2023-08-07 DIAGNOSIS — R19.7 DIARRHEA, UNSPECIFIED TYPE: ICD-10-CM

## 2023-08-07 DIAGNOSIS — G89.29 CHRONIC ABDOMINAL PAIN: ICD-10-CM

## 2023-08-11 ENCOUNTER — OFFICE VISIT (OUTPATIENT)
Dept: NEUROLOGY | Facility: CLINIC | Age: 28
End: 2023-08-11
Payer: COMMERCIAL

## 2023-08-11 VITALS — RESPIRATION RATE: 16 BRPM | BODY MASS INDEX: 36.91 KG/M2 | HEIGHT: 67 IN

## 2023-08-11 DIAGNOSIS — Z97.5 IUD (INTRAUTERINE DEVICE) IN PLACE: ICD-10-CM

## 2023-08-11 DIAGNOSIS — G44.89 CHRONIC MIXED HEADACHE SYNDROME: ICD-10-CM

## 2023-08-11 DIAGNOSIS — Z98.2 PRESENCE OF VENTRICULAR SHUNT: ICD-10-CM

## 2023-08-11 DIAGNOSIS — F31.78 BIPOLAR DISORDER, IN FULL REMISSION, MOST RECENT EPISODE MIXED: ICD-10-CM

## 2023-08-11 DIAGNOSIS — F41.1 GAD (GENERALIZED ANXIETY DISORDER): ICD-10-CM

## 2023-08-11 DIAGNOSIS — G93.2 IDIOPATHIC INTRACRANIAL HYPERTENSION: ICD-10-CM

## 2023-08-11 DIAGNOSIS — G43.119 CLASSICAL MIGRAINE WITH INTRACTABLE MIGRAINE: Primary | ICD-10-CM

## 2023-08-11 DIAGNOSIS — Z79.899 USE OF MEDICATION WITH TERATOGENIC POTENTIAL IN FEMALE OF REPRODUCTIVE AGE: ICD-10-CM

## 2023-08-11 PROCEDURE — 99205 PR OFFICE/OUTPT VISIT, NEW, LEVL V, 60-74 MIN: ICD-10-PCS | Mod: 95,,, | Performed by: PSYCHIATRY & NEUROLOGY

## 2023-08-11 PROCEDURE — 99417 PR PROLONGED SVC, OUTPT, W/WO DIRECT PT CONTACT,  EA ADDTL 15 MIN: ICD-10-PCS | Mod: 95,,, | Performed by: PSYCHIATRY & NEUROLOGY

## 2023-08-11 PROCEDURE — 3008F BODY MASS INDEX DOCD: CPT | Mod: CPTII,95,, | Performed by: PSYCHIATRY & NEUROLOGY

## 2023-08-11 PROCEDURE — 1159F MED LIST DOCD IN RCRD: CPT | Mod: CPTII,95,, | Performed by: PSYCHIATRY & NEUROLOGY

## 2023-08-11 PROCEDURE — 3008F PR BODY MASS INDEX (BMI) DOCUMENTED: ICD-10-PCS | Mod: CPTII,95,, | Performed by: PSYCHIATRY & NEUROLOGY

## 2023-08-11 PROCEDURE — 99205 OFFICE O/P NEW HI 60 MIN: CPT | Mod: 95,,, | Performed by: PSYCHIATRY & NEUROLOGY

## 2023-08-11 PROCEDURE — 99417 PROLNG OP E/M EACH 15 MIN: CPT | Mod: 95,,, | Performed by: PSYCHIATRY & NEUROLOGY

## 2023-08-11 PROCEDURE — 1159F PR MEDICATION LIST DOCUMENTED IN MEDICAL RECORD: ICD-10-PCS | Mod: CPTII,95,, | Performed by: PSYCHIATRY & NEUROLOGY

## 2023-08-11 RX ORDER — ERENUMAB-AOOE 140 MG/ML
140 INJECTION, SOLUTION SUBCUTANEOUS
Qty: 1 ML | Refills: 11 | Status: SHIPPED | OUTPATIENT
Start: 2023-08-11 | End: 2023-09-27

## 2023-08-11 RX ORDER — FOLIC ACID 1 MG/1
1 TABLET ORAL DAILY
Qty: 90 TABLET | Refills: 3 | Status: SHIPPED | OUTPATIENT
Start: 2023-08-11 | End: 2023-12-05

## 2023-08-11 RX ORDER — RIMEGEPANT SULFATE 75 MG/75MG
75 TABLET, ORALLY DISINTEGRATING ORAL ONCE AS NEEDED
Qty: 8 TABLET | Refills: 3 | Status: SHIPPED | OUTPATIENT
Start: 2023-08-11 | End: 2023-08-11

## 2023-08-11 NOTE — PROGRESS NOTES
"Subjective:       Patient ID: Rafaela Zuniga is a 27 y.o. female.    Chief Complaint: Migraine          HPI          The patient presented virtually on 08- for evaluation of chronic mixed headaches.        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 CSF OP was in the 30's and did not respond to Diamox and TPM so she underwent RT VPS in 2017 which helped tremendously. No known history of SLE/AID. No confusion. No LOC/ELIZABETH. No seizures. No history of hypercoagulability.No excessive vitamin A intake/Accutane. No Doxycycline. No pregnancy. No BCPs/OCPs. Currently uses IUCD. On 05- Dr. Richardson NL Optic Disc No papilledema . On 05- Shunt Series Intact RT VPS and Brain MRV NL.      The patient suffered a different headaches since childhood. The headaches progress from different areas and involves different sides with a throbbing nature. It can involve the right side, the left side or both sides preceded by visual aura. No associated neurological deficits. History is significant for prodromal irritability and urinary frequency.  The headaches are getting more frequent and come every other day since . The headaches are also getting more severe 6-8/10 headaches that cause significant morbidity. The headache is associated with nausea and light, sound sensitivity. The patient also vomits occasionally.  The patient feels "down" during the headache with no racing. Physical and emotional stressors provoke and aggravate the headache.  The headache builds up slowly towards the middle of the day or the end of the day.   The headaches are progressively getting worse and interfering with daily activity.  The headache is associated with scalp sensitivity. Lack of sleep is a significant trigger of the headache.  No neck pain with radiation. No TMJ problems.  The patient " denies blurry vision and ear ringing with these headaches. The headache is not positional or postural but worsens with movement and Valsalva. Sleep help lessen the headache. No history of head trauma. No history of seizures. No history of smoking. No caffeine overuse. No vertigo. No blacking out.  No fever, chills, or rigors. No history of significant memory loss. No history of strokes.  The patient cannot think of food that aggravates the headache. Family history is remarkable for migraine (mother). No family history of early dementia.    Abortive therapies (tried and failed): OTC Medications, Caffeine, Triptans (Imitrex, Maxalt), Tramadol       Preventative therapies (tried and failed): AEDs (TPM, Diamox, LTG), BBs (Inderal, Toprol), CCBs (Verapamil), TCA (Elavil), SSRIs.           The originating site (patient location) is: Home.      The distant site (neurologist location) is: [facility] at [facility].      The chief complaint leading to consultation is: HEADACHES       Visit type: Virtual visit with synchronous audio and video.      Consent: The patient verbally consented to participating in the video visit and informed that may decline to receive medical services by telemedicine and may withdraw from such care at any time.      I discussed with the patient the nature of our telemedicine visits, that:      I  would evaluate the patient and recommend diagnostics and treatments based on my assessment.    Our sessions are not being recorded and that personal health information is protected.    Our team would provide follow up care in person if/when the patient needs it.    Virtual (video/telemedicine) visits have significant limitations. A telemedicine exam is primarily focused on the history and what I can observe. Several critical parts of the neurological exam cannot be performed.           Review of Systems   Constitutional:  Negative for appetite change and fatigue.   HENT:  Negative for  hearing loss and tinnitus.    Eyes:  Negative for photophobia and visual disturbance.   Respiratory:  Negative for apnea and shortness of breath.    Cardiovascular:  Negative for chest pain and palpitations.   Gastrointestinal:  Negative for nausea and vomiting.   Endocrine: Negative for cold intolerance and heat intolerance.   Genitourinary:  Negative for difficulty urinating and urgency.   Musculoskeletal:  Negative for arthralgias, back pain, gait problem, joint swelling, myalgias, neck pain and neck stiffness.   Skin:  Negative for color change and rash.   Allergic/Immunologic: Negative for environmental allergies and immunocompromised state.   Neurological:  Positive for headaches. Negative for dizziness, tremors, seizures, syncope, facial asymmetry, speech difficulty, weakness, light-headedness and numbness.   Hematological:  Negative for adenopathy. Does not bruise/bleed easily.   Psychiatric/Behavioral:  Negative for agitation, behavioral problems, confusion, decreased concentration, dysphoric mood, hallucinations, self-injury, sleep disturbance and suicidal ideas. The patient is not hyperactive.                          Current Outpatient Medications:     ARIPiprazole (ABILIFY) 2 MG Tab, Take 1 tablet (2 mg total) by mouth once daily., Disp: 90 tablet, Rfl: 0    buPROPion (WELLBUTRIN XL) 300 MG 24 hr tablet, Take 1 tablet (300 mg total) by mouth once daily., Disp: 90 tablet, Rfl: 0    busPIRone (BUSPAR) 15 MG tablet, Take 1 tablet (15 mg total) by mouth 2 (two) times daily., Disp: 180 tablet, Rfl: 0    diazePAM (VALIUM) 5 MG tablet, Take 1 tablet (5 mg total) by mouth 2 (two) times daily as needed for Anxiety., Disp: 60 tablet, Rfl: 2    ondansetron (ZOFRAN) 4 MG tablet, Take 1 tablet (4 mg total) by mouth every 8 (eight) hours as needed for Nausea., Disp: 30 tablet, Rfl: 0    traZODone (DESYREL) 50 MG tablet, Take 0.5-1 tablets (25-50 mg total) by mouth nightly as needed for Insomnia., Disp: 90 tablet,  Rfl: 0    erenumab-aooe (AIMOVIG AUTOINJECTOR) 140 mg/mL autoinjector, Inject 1 mL (140 mg total) into the skin every 28 days., Disp: 1 mL, Rfl: 11    rimegepant (NURTEC) 75 mg odt, Take 1 tablet (75 mg total) by mouth once as needed for Migraine (Max 3 times a week). Place ODT tablet on the tongue; alternatively the ODT tablet may be placed under the tongue, Disp: 8 tablet, Rfl: 3    Past Medical History:   Diagnosis Date    Anxiety     Bipolar disorder     Depression     Idiopathic intracranial hypertension     Insomnia        Past Surgical History:   Procedure Laterality Date    brain shunt      eye sheath finistration Bilateral        Social History     Socioeconomic History    Marital status: Single   Tobacco Use    Smoking status: Never    Smokeless tobacco: Never   Substance and Sexual Activity    Alcohol use: Yes     Comment: Drinks socially    Drug use: Never    Sexual activity: Yes     Partners: Female, Male     Birth control/protection: I.U.D.             Past/Current Medical/Surgical History, Past/Current Social History, Past/Current Family History and Past/Current Medications were reviewed in detail.        Objective:                 GENERAL APPEARANCE:           The patient looks comfortable.    BMI 36.91    No signs of respiratory distress.    Normal breathing pattern.    No dysmorphic features    Normal eye contact.         GENERAL MEDICAL EXAM:    HEENT:  Head is atraumatic normocephalic.      Neck and Axillae: No JVD. No visible lesions.    Cardiopulmonary: No cyanosis. No tachypnea. Normal respiratory effort.    Gastrointestinal/Urogenital:  No jaundice. No stomas or lesions. No visible hernias. No catheters.     Skin, Hair and Nails: No pathognonomic skin rash. No neurofibromatosis. No visible lesions.No stigmata of autoimmune disease. No clubbing.    Limbs: No varicose veins. No visible swelling.    Muskoskeletal: No visible deformities.No visible lesions.             Neurologic Exam     Mental  Status   Oriented to person, place, and time.   Follows 3 step commands.   Attention: normal. Concentration: normal.   Speech: speech is normal   Level of consciousness: alert  Able to name object. Able to repeat. Normal comprehension.     Cranial Nerves     CN III, IV, VI   Extraocular motions are normal.   CN III: no CN III palsy  CN VI: no CN VI palsy  Nystagmus: none   Diplopia: none  Ophthalmoparesis: none  Upgaze: normal  Downgaze: normal  Conjugate gaze: present    CN VII   Facial expression full, symmetric.   Right facial weakness: none  Left facial weakness: none    CN VIII   CN VIII normal.   Hearing: intact    CN IX, X   CN IX normal.   CN X normal.   Palate: symmetric    CN XII   CN XII normal.   Tongue: not atrophic  Fasciculations: absent  Tongue deviation: none    Motor Exam   Muscle bulk: normal  Right arm pronator drift: absent  Left arm pronator drift: absent  Moves BUE BLE Symmetrically.      Gait, Coordination, and Reflexes     Gait  Gait: normal    Coordination   Romberg: negative  Heel to shin coordination: normal  Tandem walking coordination: normal    Tremor   Resting tremor: absent  Intention tremor: absent  Action tremor: absent    Lab Results   Component Value Date    WBC 8.16 08/05/2023    HGB 12.3 08/05/2023    HCT 39.1 08/05/2023    MCV 90 08/05/2023     08/05/2023       Sodium   Date Value Ref Range Status   08/05/2023 141 136 - 145 mmol/L Final     Potassium   Date Value Ref Range Status   08/05/2023 4.2 3.5 - 5.1 mmol/L Final     Chloride   Date Value Ref Range Status   08/05/2023 105 95 - 110 mmol/L Final     CO2   Date Value Ref Range Status   08/05/2023 24 23 - 29 mmol/L Final     Glucose   Date Value Ref Range Status   08/05/2023 76 70 - 110 mg/dL Final     BUN   Date Value Ref Range Status   08/05/2023 13 6 - 20 mg/dL Final     Creatinine   Date Value Ref Range Status   08/05/2023 0.9 0.5 - 1.4 mg/dL Final     Calcium   Date Value Ref Range Status   08/05/2023 9.2 8.7 -  10.5 mg/dL Final     Total Protein   Date Value Ref Range Status   08/05/2023 7.2 6.0 - 8.4 g/dL Final     Albumin   Date Value Ref Range Status   08/05/2023 3.6 3.5 - 5.2 g/dL Final     Total Bilirubin   Date Value Ref Range Status   08/05/2023 0.2 0.1 - 1.0 mg/dL Final     Comment:     For infants and newborns, interpretation of results should be based  on gestational age, weight and in agreement with clinical  observations.    Premature Infant recommended reference ranges:  Up to 24 hours.............<8.0 mg/dL  Up to 48 hours............<12.0 mg/dL  3-5 days..................<15.0 mg/dL  6-29 days.................<15.0 mg/dL       Alkaline Phosphatase   Date Value Ref Range Status   08/05/2023 78 55 - 135 U/L Final     AST   Date Value Ref Range Status   08/05/2023 20 10 - 40 U/L Final     ALT   Date Value Ref Range Status   08/05/2023 16 10 - 44 U/L Final     Anion Gap   Date Value Ref Range Status   08/05/2023 12 8 - 16 mmol/L Final       Lab Results   Component Value Date    TSH 1.338 08/05/2023             LABORATORY EVALUATION    08-    STEVIE -ve, RF <13.0  level NL     Labs WNL     08-    CBC, CMP, TFT, HIV, HCV Unremarkable         RADIOLOGY EVALUATION       PRIOR RECORDS (MD MONSTER)    CSF OP 30's cm H2O    S/P RT VPS in 2017      05-    CTH RT VPS.    Shunt Series Intact RT VPS    Brain MRV NL                 NEUROPHYSIOLOGY AND NEURO OPHTHALMOLOGY EVALUATION       05-    Dr. Richardson NL Optic Disc No papilledema         PATHOLOGY EVALUATION        NEUROCOGNITIVE AND NEUROPSYCHOLOGY EVALUATION     Reviewed the neuroimaging independently       Assessment:           1. Classical migraine with intractable migraine    2. Idiopathic intracranial hypertension    3. Bipolar disorder, in full remission, most recent episode mixed    4. Presence of ventricular shunt    5. IUD (intrauterine device) in place    6. BMI 36.0-36.9,adult    7. ISAAC (generalized anxiety disorder)    8. Chronic mixed  headache syndrome          Plan:               IDIOPATHIC INTRACRANIAL HYPERTENSION (IIH) PKA PSEUDOTUMOR CEREBRI (PTC)    STATUS POST RIGHT VENTRICULOPERITONEAL SHUNT (VPS) IN 2017, WELL-CONTROLLED       Continue neurosurgical surveillance.     AID IMANI ZHENG.    Weight loss.      MIGRAINE, CLASSICAL, WITH AURA, EPISODIC, HIGH FREQUENCY           MANAGEMENT       HEADACHE DIARY     DISCUSSED THE THREE-FOLD MANAGEMENT OF MIGRAINE:      LIFESTYLE CHANGES:       Good sleep hygiene  Avoid general triggers like lack of sleep/too much sleep, prolonged sun exposure, excessive screen time and specific triggers based on you own diary   Minimize physical and emotional stress  Smoking avoidance and cessation  Limit caffeine drinks to 1-2 a day   Good hydration   Small frequent meals and avoid skipping meals   Moderate 30-minute-long aerobic exercises 3 times/week. Avoid strenuous exercise         ABORTIVE MEDICATIONS (ACUTE-RESCUE MEDICATIONS):     Should only be taken 2-3 times/week to avoid rebound and overuse headaches.    Try Nuretc (rimegepant)75 mg ODT PRN.    Abortive therapies (tried and failed): OTC Medications, Caffeine, Triptans (Imitrex, Maxalt), Tramadol         AVOID NARCOTICS (OPIATES)      1. No randomized controlled study shows pain-free results with opioids in the treatment of migraine.     2. The physiologic consequences of opioid use are adverse, occur quickly, and can be permanent. Decreased gray matter, release of calcitonin gene-related peptide, dynorphin, and pro-inflammatory peptides, and activation of excitatory glutamate receptors are all associated with opioid exposure.     3. Opioids are pro-nociceptive, prevent reversal of migraine central sensitization, and interfere with triptan effectiveness.     4.Opioids precipitate bad clinical outcomes, especially transformation to daily headache.     5. They cause disease progression, comorbidity, and excessive health care consumption.           NEXT  OPTIONS:    Gepants:Ubrelvy (ubrogepant) 100 mg    Ditans: Reyvow (lasmiditan) 100 mg (No driving due to sedation)      LAST RESORT:     DHE NS Trudhesa (Max 2 a week)     C/I: concomitant use of vasoconstrictors like Triptans, strong CY inhibitors such as HAART PIs (eg, ritonavir, nelfinavir, or indinavir) and Macrolides (eg, erythromycin or clarithromycin), CAD, PVD, Stroke/TIA and Uncontrolled HTN.  Serious SEs include Vasospasm and Fibrosis (chronic use).       PREVENTATIVE (MORE ACCURATELY MIGRAINE REDUCTION) MEDICATIONS:           Since the patient's headache is very frequent a lengthy discussion about preventative medications was carried out.The patient understands that prevention means DECREASING frequency and severity and NOT elimination.The patient was made aware that any new medication can cause serious allergic reaction.The medication is considered failure only if a therapeutic dose reached and maintained for 6-8 weeks.        NEUROPHARMACOLOGY     Try Erenumab (Aimovig) 140 mg SQ Pen monthly    Preventative therapies (tried and failed): AEDs (TPM, Diamox, LTG), BBs (Inderal, Toprol), CCBs (Verapamil), TCA (Elavil), SSRIs.       HELPFUL SUPPLEMENTS:     Helpful supplements include Co-Q 10, B2, Mg, Feverfew (Dolovent combination) and butterbur (Petadolex)        NEXT OPTIONS:       ANTI-CGRP AGENTS: Qulipta (alogepant) 60 mg QD, Galcanezumab (Emgality) 120 mg SQ Pen monthly after a loading dose of 240 mg  and Fremnezumab (Ajovy) (Ligand Blocker): 225 mg SQ monthly or 675 mg every 3 months     Botox 200 units every 3 months .      LAST RESORT OPTIONS:      Namenda 10 mg BID     Valproic acid/ Depakote         NEUROMODULATION     Cefaly, Relivion, Nerivio and GammaCore (VNS)             WOMEN IN CHILD BEARING PERIOD     All migraine medications are not safe during pregnancy and the patient was made aware of this fact. Any pregnancy should be planned, and medications should be stopped PRIOR to pregnancy  planning. Folic acid 1 mg daily was recommended. However, hormonal birth control complicates the management of migraine and can exacerbate migraine. If possible, mechanical contraception should be a better option.                     MEDICAL/SURGICAL COMORBIDITIES     All relevant medical comorbidities noted and managed by primary care physician and medical care team.          HEALTHY LIFESTYLE AND PREVENTATIVE CARE    The patient to adhere to the age-appropriate health maintenance guidelines including screening tests and vaccinations. The patient to adhere to  healthy lifestyle, optimal weight, exercise, healthy diet, good sleep hygiene and avoiding drugs including smoking, alcohol and recreational drugs.                RTC in 3 months           Mehran Alba MD, FAAN    Attending Neurologist/Epileptologist         Diplomate, American Board of Psychiatry and Neurology    Diplomate, American Board of Clinical Neurophysiology     Fellow, American Academy of Neurology         I spent a total of 93 minutes on the day of the visit.  This includes face to face time and non-face to face time preparing to see the patient (eg, review of tests), obtaining and/or reviewing separately obtained history, documenting clinical information in the electronic or other health record, independently interpreting results and communicating results to the patient/family/caregiver, or care coordinator.

## 2023-08-21 ENCOUNTER — PATIENT MESSAGE (OUTPATIENT)
Dept: RESEARCH | Facility: HOSPITAL | Age: 28
End: 2023-08-21
Payer: COMMERCIAL

## 2023-08-23 DIAGNOSIS — F41.0 GENERALIZED ANXIETY DISORDER WITH PANIC ATTACKS: ICD-10-CM

## 2023-08-23 DIAGNOSIS — F41.1 GENERALIZED ANXIETY DISORDER WITH PANIC ATTACKS: ICD-10-CM

## 2023-08-23 DIAGNOSIS — F31.32 BIPOLAR 1 DISORDER, DEPRESSED, MODERATE: ICD-10-CM

## 2023-08-23 RX ORDER — BUPROPION HYDROCHLORIDE 300 MG/1
300 TABLET ORAL DAILY
Qty: 90 TABLET | Refills: 0 | Status: SHIPPED | OUTPATIENT
Start: 2023-08-23 | End: 2023-10-12 | Stop reason: SDUPTHER

## 2023-08-23 RX ORDER — BUSPIRONE HYDROCHLORIDE 15 MG/1
15 TABLET ORAL 2 TIMES DAILY
Qty: 180 TABLET | Refills: 0 | Status: SHIPPED | OUTPATIENT
Start: 2023-08-23 | End: 2023-10-12 | Stop reason: SDUPTHER

## 2023-08-23 RX ORDER — BUSPIRONE HYDROCHLORIDE 10 MG/1
10 TABLET ORAL 2 TIMES DAILY
Qty: 180 TABLET | Refills: 0 | OUTPATIENT
Start: 2023-08-23

## 2023-08-23 RX ORDER — BUPROPION HYDROCHLORIDE 300 MG/1
300 TABLET ORAL
Qty: 90 TABLET | Refills: 0 | OUTPATIENT
Start: 2023-08-23

## 2023-08-26 ENCOUNTER — LAB VISIT (OUTPATIENT)
Dept: LAB | Facility: HOSPITAL | Age: 28
End: 2023-08-26
Attending: PSYCHIATRY & NEUROLOGY
Payer: COMMERCIAL

## 2023-08-26 DIAGNOSIS — G93.2 IDIOPATHIC INTRACRANIAL HYPERTENSION: ICD-10-CM

## 2023-08-26 LAB — RHEUMATOID FACT SERPL-ACNC: <13 IU/ML (ref 0–15)

## 2023-08-26 PROCEDURE — 86038 ANTINUCLEAR ANTIBODIES: CPT | Performed by: PSYCHIATRY & NEUROLOGY

## 2023-08-26 PROCEDURE — 36415 COLL VENOUS BLD VENIPUNCTURE: CPT | Performed by: PSYCHIATRY & NEUROLOGY

## 2023-08-26 PROCEDURE — 86431 RHEUMATOID FACTOR QUANT: CPT | Performed by: PSYCHIATRY & NEUROLOGY

## 2023-08-28 LAB — ANA SER QL IF: NORMAL

## 2023-08-30 ENCOUNTER — TELEPHONE (OUTPATIENT)
Dept: NEUROLOGY | Facility: CLINIC | Age: 28
End: 2023-08-30
Payer: COMMERCIAL

## 2023-08-30 NOTE — TELEPHONE ENCOUNTER
----- Message from Thu Ortega sent at 8/30/2023  4:01 PM CDT -----  Contact: patient [phone]  Patient is returning a phone call.  Who left a message for the patient:  Tico  Does patient know what this is regarding:  patient is not sure why she was calling  Would you like a call back, or a response through your [facility] portal?:  by phone  Comments:

## 2023-08-30 NOTE — TELEPHONE ENCOUNTER
----- Message from Lupis Valdivia NP sent at 8/29/2023 11:10 PM CDT -----  Labs Reviewed:    08-    STEVIE -ve, RF <13.0  level NL     Labs WNL

## 2023-09-27 ENCOUNTER — PATIENT MESSAGE (OUTPATIENT)
Dept: NEUROLOGY | Facility: CLINIC | Age: 28
End: 2023-09-27
Payer: COMMERCIAL

## 2023-09-27 RX ORDER — FREMANEZUMAB-VFRM 225 MG/1.5ML
225 INJECTION SUBCUTANEOUS
Qty: 1 EACH | Refills: 11 | Status: SHIPPED | OUTPATIENT
Start: 2023-09-27 | End: 2023-12-12 | Stop reason: SDUPTHER

## 2023-10-12 ENCOUNTER — OFFICE VISIT (OUTPATIENT)
Dept: PSYCHIATRY | Facility: CLINIC | Age: 28
End: 2023-10-12
Payer: COMMERCIAL

## 2023-10-12 VITALS — DIASTOLIC BLOOD PRESSURE: 71 MMHG | SYSTOLIC BLOOD PRESSURE: 107 MMHG | HEART RATE: 79 BPM

## 2023-10-12 DIAGNOSIS — F31.32 BIPOLAR 1 DISORDER, DEPRESSED, MODERATE: ICD-10-CM

## 2023-10-12 DIAGNOSIS — F50.9 EATING DISORDER, UNSPECIFIED TYPE: ICD-10-CM

## 2023-10-12 DIAGNOSIS — F90.2 ATTENTION DEFICIT HYPERACTIVITY DISORDER (ADHD), COMBINED TYPE: Primary | ICD-10-CM

## 2023-10-12 DIAGNOSIS — Z91.52 PERSONAL HISTORY OF NONSUICIDAL SELF-INJURY: ICD-10-CM

## 2023-10-12 DIAGNOSIS — F41.0 GENERALIZED ANXIETY DISORDER WITH PANIC ATTACKS: ICD-10-CM

## 2023-10-12 DIAGNOSIS — F41.1 GAD (GENERALIZED ANXIETY DISORDER): ICD-10-CM

## 2023-10-12 DIAGNOSIS — F51.05 INSOMNIA DUE TO MENTAL CONDITION: ICD-10-CM

## 2023-10-12 DIAGNOSIS — F41.1 GENERALIZED ANXIETY DISORDER WITH PANIC ATTACKS: ICD-10-CM

## 2023-10-12 DIAGNOSIS — Z87.828 HISTORY OF TRAUMA: ICD-10-CM

## 2023-10-12 PROCEDURE — 3074F PR MOST RECENT SYSTOLIC BLOOD PRESSURE < 130 MM HG: ICD-10-PCS | Mod: CPTII,S$GLB,, | Performed by: PSYCHOLOGIST

## 2023-10-12 PROCEDURE — 99999 PR PBB SHADOW E&M-EST. PATIENT-LVL II: ICD-10-PCS | Mod: PBBFAC,,, | Performed by: PSYCHOLOGIST

## 2023-10-12 PROCEDURE — 1159F PR MEDICATION LIST DOCUMENTED IN MEDICAL RECORD: ICD-10-PCS | Mod: CPTII,S$GLB,, | Performed by: PSYCHOLOGIST

## 2023-10-12 PROCEDURE — 99215 PR OFFICE/OUTPT VISIT, EST, LEVL V, 40-54 MIN: ICD-10-PCS | Mod: S$GLB,,, | Performed by: PSYCHOLOGIST

## 2023-10-12 PROCEDURE — 3078F PR MOST RECENT DIASTOLIC BLOOD PRESSURE < 80 MM HG: ICD-10-PCS | Mod: CPTII,S$GLB,, | Performed by: PSYCHOLOGIST

## 2023-10-12 PROCEDURE — 3074F SYST BP LT 130 MM HG: CPT | Mod: CPTII,S$GLB,, | Performed by: PSYCHOLOGIST

## 2023-10-12 PROCEDURE — 3078F DIAST BP <80 MM HG: CPT | Mod: CPTII,S$GLB,, | Performed by: PSYCHOLOGIST

## 2023-10-12 PROCEDURE — 99215 OFFICE O/P EST HI 40 MIN: CPT | Mod: S$GLB,,, | Performed by: PSYCHOLOGIST

## 2023-10-12 PROCEDURE — 99999 PR PBB SHADOW E&M-EST. PATIENT-LVL II: CPT | Mod: PBBFAC,,, | Performed by: PSYCHOLOGIST

## 2023-10-12 PROCEDURE — 1159F MED LIST DOCD IN RCRD: CPT | Mod: CPTII,S$GLB,, | Performed by: PSYCHOLOGIST

## 2023-10-12 RX ORDER — BUSPIRONE HYDROCHLORIDE 15 MG/1
15 TABLET ORAL 2 TIMES DAILY
Qty: 180 TABLET | Refills: 0 | Status: SHIPPED | OUTPATIENT
Start: 2023-10-12 | End: 2023-11-09 | Stop reason: SDUPTHER

## 2023-10-12 RX ORDER — TRAZODONE HYDROCHLORIDE 50 MG/1
25-50 TABLET ORAL NIGHTLY PRN
Qty: 90 TABLET | Refills: 0 | Status: SHIPPED | OUTPATIENT
Start: 2023-10-12 | End: 2023-11-09

## 2023-10-12 RX ORDER — BUPROPION HYDROCHLORIDE 300 MG/1
300 TABLET ORAL DAILY
Qty: 90 TABLET | Refills: 0 | Status: SHIPPED | OUTPATIENT
Start: 2023-10-12 | End: 2023-11-09 | Stop reason: SDUPTHER

## 2023-10-12 RX ORDER — ARIPIPRAZOLE 5 MG/1
5 TABLET ORAL DAILY
Qty: 90 TABLET | Refills: 0 | Status: SHIPPED | OUTPATIENT
Start: 2023-10-12 | End: 2023-11-09

## 2023-10-12 RX ORDER — DIAZEPAM 5 MG/1
5 TABLET ORAL 2 TIMES DAILY PRN
Qty: 60 TABLET | Refills: 2 | Status: SHIPPED | OUTPATIENT
Start: 2023-10-12 | End: 2023-11-09 | Stop reason: SDUPTHER

## 2023-10-12 RX ORDER — DEXTROAMPHETAMINE SACCHARATE, AMPHETAMINE ASPARTATE, DEXTROAMPHETAMINE SULFATE AND AMPHETAMINE SULFATE 1.25; 1.25; 1.25; 1.25 MG/1; MG/1; MG/1; MG/1
5 TABLET ORAL 2 TIMES DAILY
Qty: 60 TABLET | Refills: 0 | Status: SHIPPED | OUTPATIENT
Start: 2023-10-12 | End: 2023-10-25

## 2023-10-12 NOTE — PROGRESS NOTES
"MEDICATION MANAGEMENT SESSION    Name: Rafaela Zuniga  Age: 27 y.o.  : 1995    Preferred Name: Rafaela    Referring provider: Brenda Acosta PA-C    Reason for Visit:  Medication follow up    Summary of Visit:  Pt reports increase to Buspar 15 mg bid has helped her anxiety level. Her mood has been more stable than prior to starting Abilify 2 mg qd in May but she did experience mood lability 2 weeks ago with hypomanic symptoms of racing thoughts, increased energy, "everything felt exciting," she was more outgoing for 2 days, then she became irritable, depressed, tired, angry; she continued to go to work/school but didn't want to get out of bed. This lasted 2 days then her mood returned to normal. She does not currently report depression/mood problems. She continues to sleep well with Trazodone 50 mg qhs. She complains of impaired focus and concentration, poor task completion, procrastination and avoidance of effortful tasks, and forgetfulness that she has struggled with daily for years and is consistent despite mood and anxiety level.    Pt administered Adult ADHD Self-Report Scale: total score: 57 (Part A: 20; Part B: 37). Discussed medication for her symptoms and consider referral for psychoeducational testing for ADHD at [facility] in case pt wants accommodations in graduate school program.    Pt is looking for a new therapist since last month. Previous therapist did not attend most recent virtual visit and hasn't responded to pt's follow up queries. Pt is aware of current 3-4 month waitlist at [facility] and prefers to find someone sooner.    Current Symptoms:   ADHD: no follow-through, disorganized, avoids effortful tasks, easily distracted, loses things   Depressive Disorder: irritable mood, tired/fatigued, concentration problems (was sad, socially withdrawn 2 weeks ago).   Anxiety Disorder: anxiety/nervousness, fatigue, irritability, concentration problems   Panic Disorder: nervous "   Manic Disorder: increased distractability, racing thoughts, reckless/risk-taking behavior   Psychotic Disorder: denied   Substance Use:  Alcohol: Socially once per month at most, 1-2 glasses of wine.     Review of Systems   Constitutional:  Positive for fatigue. Negative for activity change, appetite change and unexpected weight change.   Respiratory:  Negative for shortness of breath.    Psychiatric/Behavioral:  Positive for decreased concentration. Negative for agitation, behavioral problems, confusion, dysphoric mood, hallucinations, self-injury, sleep disturbance and suicidal ideas. The patient is nervous/anxious. The patient is not hyperactive.       ADULT ADHD SELF-REPORT SCALE   10/12/23 1403   ADULT ADHD Part A   How often do you have trouble wrapping up the final details of a project once the chanllenging parts have been done? 3   How often do you have difficulty getting things in order when you have to do a task that requires organization? 4   How often do you have problems remembering appointments or obligations? 3   When you have a task that requires a lot of thought, how often do you avoid or delay getting started? 4   How often do you fidget or squirm with your hands or feet when you have to sit down for a long time? 4   How often do you feel overly active and compelled to do things, like you were driven by a motor? 2   Part A Score 20   ADULT ADHD Part B   How often do you make careless mistakes when you have to work on a boring or difficult project? 3   How often do you have difficulty keeping your attention when you are doing boring or repetitive work? 4   How often do you have difficulty concentrating on what people say to you, even when they are speaking to you directly? 2   How often do you misplace or have difficulty finding things at home or at work? 3   How often are you distracted by activity or noise around you? 4   How often do you leave your seat in meetings or other situations in which  "you are expected to remain seated? 2   How often do you feel restless or fidgety? 4   How often do you have difficulty unwinding and relaxing when you have time to yourself? 3   How often do you find yourself talking too much when you are in social situations? 4   When you're in a conversation, how often do you find yourself finishing the sentences of the people you are talking to before they can finish them themselves? 3   How often do you have difficulty waiting your turn in situations when turn taking is required? 2   How often do you interrupt others when they are busy? 3   Part B Score 37     Constitutional:  Vitals:  Most recent vital signs were reviewed.   Last 3 sets of Vitals        8/1/2023     3:08 PM 8/11/2023     7:58 AM 10/12/2023     1:30 PM   Vitals - 1 value per visit   SYSTOLIC 116  107   DIASTOLIC 64  71   Pulse 92  79   Temp 98 °F (36.7 °C)     Resp 18 16    SPO2 100 %     Weight (lb) 235.67     Weight (kg) 106.9     Height 5' 7" (1.702 m) 5' 7" (1.702 m)    BMI (Calculated) 36.9     Pain Score Zero            Psychiatric:  Oriented: x 3   Attitude: cooperative   Eye Contact: good   Behavior: wnl   Mood: "unfocused"  Affect: appropriate range   Attention: intact   Concentration: grossly intact   Thought Process: goal directed   Speech: intelligible  Volume: WNL   Quantity: WNL   Rhythm: WNL  Insight: fair to good   Threats: no SI / HI   Memory: Grossly intact  Psychosis: denies all   Estimate of Intellectual Function: average   Judgment: fair to good  Relevant Elements of Neurological Exam: normal gait     Allergy Review:   Review of patient's allergies indicates:  No Known Allergies     Medical Problem List:   Patient Active Problem List   Diagnosis    Bipolar disorder, in full remission, most recent episode mixed    Idiopathic intracranial hypertension    Presence of ventricular shunt    IUD (intrauterine device) in place    BMI 36.0-36.9,adult    ISAAC (generalized anxiety disorder)    Classical " migraine with intractable migraine    Chronic mixed headache syndrome      Encounter Diagnoses   Name Primary?    Generalized anxiety disorder with panic attacks     Personal history of nonsuicidal self-injury     Bipolar 1 disorder, depressed, moderate     History of trauma     ISAAC (generalized anxiety disorder)     Insomnia due to mental condition     Eating disorder, unspecified type     Attention deficit hyperactivity disorder (ADHD), combined type Yes      PLAN:  Medication Management: Continue current medications. Discussed risks, benefits, and alternatives to treatment plan documented above with patient. I answered all patient questions related to this plan, and patient expressed understanding and agreement.      Follow up in about 4 weeks (around 11/9/2023) for Medication follow up.     Medication List with Changes/Refills   Current Medications    ARIPIPRAZOLE (ABILIFY) 2 MG TAB    Take 1 tablet (2 mg total) by mouth once daily.    BUPROPION (WELLBUTRIN XL) 300 MG 24 HR TABLET    Take 1 tablet (300 mg total) by mouth once daily.    BUSPIRONE (BUSPAR) 15 MG TABLET    Take 1 tablet (15 mg total) by mouth 2 (two) times daily.    DIAZEPAM (VALIUM) 5 MG TABLET    Take 1 tablet (5 mg total) by mouth 2 (two) times daily as needed for Anxiety.    FOLIC ACID (FOLVITE) 1 MG TABLET    Take 1 tablet (1 mg total) by mouth once daily.    FREMANEZUMAB-VFRM (AJOVY AUTOINJECTOR) 225 MG/1.5 ML AUTOINJECTOR    Inject 1.5 mLs (225 mg total) into the skin every 28 days.    ONDANSETRON (ZOFRAN) 4 MG TABLET    Take 1 tablet (4 mg total) by mouth every 8 (eight) hours as needed for Nausea.    TRAZODONE (DESYREL) 50 MG TABLET    Take 0.5-1 tablets (25-50 mg total) by mouth nightly as needed for Insomnia.      Time spent with pt including note preparation: 40 minutes     Jessica Warner, PhD, MP  Medical Psychologist

## 2023-10-20 ENCOUNTER — PATIENT MESSAGE (OUTPATIENT)
Dept: INTERNAL MEDICINE | Facility: CLINIC | Age: 28
End: 2023-10-20
Payer: COMMERCIAL

## 2023-10-25 ENCOUNTER — PATIENT MESSAGE (OUTPATIENT)
Dept: PSYCHIATRY | Facility: CLINIC | Age: 28
End: 2023-10-25
Payer: COMMERCIAL

## 2023-10-25 DIAGNOSIS — F90.2 ATTENTION DEFICIT HYPERACTIVITY DISORDER (ADHD), COMBINED TYPE: ICD-10-CM

## 2023-10-25 RX ORDER — DEXTROAMPHETAMINE SACCHARATE, AMPHETAMINE ASPARTATE, DEXTROAMPHETAMINE SULFATE AND AMPHETAMINE SULFATE 2.5; 2.5; 2.5; 2.5 MG/1; MG/1; MG/1; MG/1
10 TABLET ORAL 2 TIMES DAILY
Qty: 60 TABLET | Refills: 0 | Status: SHIPPED | OUTPATIENT
Start: 2023-10-25 | End: 2023-11-09

## 2023-10-30 ENCOUNTER — OFFICE VISIT (OUTPATIENT)
Dept: INTERNAL MEDICINE | Facility: CLINIC | Age: 28
End: 2023-10-30
Payer: COMMERCIAL

## 2023-10-30 ENCOUNTER — HOSPITAL ENCOUNTER (OUTPATIENT)
Dept: RADIOLOGY | Facility: HOSPITAL | Age: 28
Discharge: HOME | End: 2023-10-30
Attending: FAMILY MEDICINE
Payer: COMMERCIAL

## 2023-10-30 VITALS
DIASTOLIC BLOOD PRESSURE: 74 MMHG | TEMPERATURE: 98 F | BODY MASS INDEX: 37.2 KG/M2 | HEIGHT: 67 IN | SYSTOLIC BLOOD PRESSURE: 118 MMHG | RESPIRATION RATE: 18 BRPM | OXYGEN SATURATION: 99 % | HEART RATE: 105 BPM | WEIGHT: 237 LBS

## 2023-10-30 DIAGNOSIS — S99.912A INJURY OF LEFT ANKLE, INITIAL ENCOUNTER: ICD-10-CM

## 2023-10-30 DIAGNOSIS — S93.402A SPRAIN OF LEFT ANKLE, UNSPECIFIED LIGAMENT, INITIAL ENCOUNTER: Primary | ICD-10-CM

## 2023-10-30 PROCEDURE — 73610 X-RAY EXAM OF ANKLE: CPT | Mod: TC,LT

## 2023-10-30 PROCEDURE — 1160F RVW MEDS BY RX/DR IN RCRD: CPT | Mod: CPTII,S$GLB,, | Performed by: FAMILY MEDICINE

## 2023-10-30 PROCEDURE — 3078F DIAST BP <80 MM HG: CPT | Mod: CPTII,S$GLB,, | Performed by: FAMILY MEDICINE

## 2023-10-30 PROCEDURE — 1159F PR MEDICATION LIST DOCUMENTED IN MEDICAL RECORD: ICD-10-PCS | Mod: CPTII,S$GLB,, | Performed by: FAMILY MEDICINE

## 2023-10-30 PROCEDURE — 99999 PR PBB SHADOW E&M-EST. PATIENT-LVL V: ICD-10-PCS | Mod: PBBFAC,,, | Performed by: FAMILY MEDICINE

## 2023-10-30 PROCEDURE — 99999 PR PBB SHADOW E&M-EST. PATIENT-LVL V: CPT | Mod: PBBFAC,,, | Performed by: FAMILY MEDICINE

## 2023-10-30 PROCEDURE — 1159F MED LIST DOCD IN RCRD: CPT | Mod: CPTII,S$GLB,, | Performed by: FAMILY MEDICINE

## 2023-10-30 PROCEDURE — 1160F PR REVIEW ALL MEDS BY PRESCRIBER/CLIN PHARMACIST DOCUMENTED: ICD-10-PCS | Mod: CPTII,S$GLB,, | Performed by: FAMILY MEDICINE

## 2023-10-30 PROCEDURE — 99214 OFFICE O/P EST MOD 30 MIN: CPT | Mod: S$GLB,,, | Performed by: FAMILY MEDICINE

## 2023-10-30 PROCEDURE — 3074F SYST BP LT 130 MM HG: CPT | Mod: CPTII,S$GLB,, | Performed by: FAMILY MEDICINE

## 2023-10-30 PROCEDURE — 99214 PR OFFICE/OUTPT VISIT, EST, LEVL IV, 30-39 MIN: ICD-10-PCS | Mod: S$GLB,,, | Performed by: FAMILY MEDICINE

## 2023-10-30 PROCEDURE — 73610 X-RAY EXAM OF ANKLE: CPT | Mod: 26,LT,, | Performed by: RADIOLOGY

## 2023-10-30 PROCEDURE — 3074F PR MOST RECENT SYSTOLIC BLOOD PRESSURE < 130 MM HG: ICD-10-PCS | Mod: CPTII,S$GLB,, | Performed by: FAMILY MEDICINE

## 2023-10-30 PROCEDURE — 3008F BODY MASS INDEX DOCD: CPT | Mod: CPTII,S$GLB,, | Performed by: FAMILY MEDICINE

## 2023-10-30 PROCEDURE — 3078F PR MOST RECENT DIASTOLIC BLOOD PRESSURE < 80 MM HG: ICD-10-PCS | Mod: CPTII,S$GLB,, | Performed by: FAMILY MEDICINE

## 2023-10-30 PROCEDURE — 73610 XR ANKLE COMPLETE 3 VIEW LEFT: ICD-10-PCS | Mod: 26,LT,, | Performed by: RADIOLOGY

## 2023-10-30 PROCEDURE — 3008F PR BODY MASS INDEX (BMI) DOCUMENTED: ICD-10-PCS | Mod: CPTII,S$GLB,, | Performed by: FAMILY MEDICINE

## 2023-10-30 RX ORDER — NAPROXEN 500 MG/1
500 TABLET ORAL 2 TIMES DAILY
Qty: 28 TABLET | Refills: 0 | Status: SHIPPED | OUTPATIENT
Start: 2023-10-30 | End: 2023-11-13

## 2023-10-30 NOTE — LETTER
October 30, 2023    Rafaela Zuniga  [address]  [address]  [facility] [address]             [facility]  Internal Medicine  [address] [place] [id number]  Phone: [phone]  Fax: [phone]   October 30, 2023     Patient: Rafaela Zuniga   YOB: 1995   Date of Visit: 10/30/2023       To Whom it May Concern:    Rafaela Zuniga was seen in my clinic on 10/30/2023. She may return to work on Tuesday, 10/31/23. Please allow to sit and elevate her ankle as needed .    Please excuse her from any classes or work missed.    If you have any questions or concerns, please don't hesitate to call.    Sincerely,         Anila Garcia MD

## 2023-10-30 NOTE — PROGRESS NOTES
Subjective:       Patient ID: Rafaela Zuniga is a 27 y.o. female.    Chief Complaint: Ankle Injury    Patient presents to clinic today reporting left ankle pain/swelling.  She had a trip and fall while walking in [place] over the weekend with her friends.  The injury occurred on Saturday.  She reports limping since injury.  She reports increased pain and swelling noted yesterday.  She is taken ibuprofen with some relief.  She reports on waking today she had difficulty ambulating, so thought she would come in and have it checked out.  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    Review of Systems   Constitutional:  Negative for chills, fatigue, fever and unexpected weight change.   Eyes:  Negative for visual disturbance.   Respiratory:  Negative for shortness of breath.    Cardiovascular:  Negative for chest pain.   Musculoskeletal:  Positive for arthralgias and joint swelling. Negative for myalgias.   Neurological:  Negative for headaches.       Objective:      Physical Exam  Vitals reviewed.   Constitutional:       General: She is not in acute distress.     Appearance: She is well-developed.   HENT:      Head: Normocephalic and atraumatic.   Eyes:      General: Lids are normal. No scleral icterus.     Extraocular Movements: Extraocular movements intact.      Conjunctiva/sclera: Conjunctivae normal.      Pupils: Pupils are equal, round, and reactive to light.   Pulmonary:      Effort: Pulmonary effort is normal.   Musculoskeletal:      Left ankle: Swelling present. No deformity, ecchymosis or lacerations. Tenderness present over the lateral malleolus. Decreased range of motion. Normal pulse.   Neurological:      Mental Status: She is alert and oriented to person, place, and time.      Gait: Gait abnormal (antalgic gait secondary to ankle pain).   Psychiatric:         Mood and Affect: Mood and affect normal.         Assessment:       1. Sprain of left ankle, unspecified ligament, initial encounter         Plan:   1. Sprain of left ankle, unspecified ligament, initial encounter  -     X-Ray Ankle Complete Left; Future; Expected date: 10/30/2023  -     naproxen (NAPROSYN) 500 MG tablet; Take 1 tablet (500 mg total) by mouth 2 (two) times daily. Take with food for 14 days  Dispense: 28 tablet; Refill: 0  -     POCT Apply ace wrap  -     CRUTCHES FOR HOME USE    Advised RICE; advised caution with ace wrap to prevent cutting off blood flow; advised crutches prn and ambulate as tolerated.  Advised to follow up if worse/persistent and will refer to specialist. Patient expressed understanding and agreement with plan.  Patient expressed understanding and agreement with plan.

## 2023-11-06 ENCOUNTER — PATIENT MESSAGE (OUTPATIENT)
Dept: INTERNAL MEDICINE | Facility: CLINIC | Age: 28
End: 2023-11-06
Payer: COMMERCIAL

## 2023-11-06 DIAGNOSIS — S93.402A SPRAIN OF LEFT ANKLE, UNSPECIFIED LIGAMENT, INITIAL ENCOUNTER: Primary | ICD-10-CM

## 2023-11-08 NOTE — PROGRESS NOTES
"MEDICATION MANAGEMENT SESSION    Name: Rafaela Zuniga  Age: 27 y.o.  : 1995    Preferred Name: Rafaela    Referring provider: Brenda Acosta PA-C    Reason for Visit:  Medication follow up    Summary of Visit:  Pt reports improvement in concentration and focus with addition of Adderall 10 mg bid but the effects wear off despite second dose and she becomes distracted and has difficulty completing tasks she starts. She has not experienced side effects aside from sleeping problems but this could be due to hypomanic symptoms. She has not felt depressed, and her anxiety is better but has experienced continued racing thoughts, though they are less frequent and don't last as long. She has some continued impulse to go out a lot and thoughts like "I can do anything." Discussed adjusting medication doses to address her current symptoms. Pt's leg was injured and she is wearing brace from fall while in [place] this weekend with friends.    Current Symptoms:   ADHD: no follow-through, easily distracted   Depressive Disorder: sleep change, tired/fatigued, concentration problems   Anxiety Disorder: anxiety/nervousness, fatigue, sleep problems   Panic Disorder: nervous   Manic Disorder: increased distractability, decreased need for sleep, racing thoughts   Psychotic Disorder: denied   Substance Use:  Alcohol: Socially once per month at most, 1-2 glasses of wine.     Review of Systems   Constitutional:  Positive for fatigue. Negative for activity change, appetite change and unexpected weight change.   Respiratory:  Negative for shortness of breath.    Psychiatric/Behavioral:  Positive for sleep disturbance. Negative for agitation, behavioral problems, confusion, decreased concentration, dysphoric mood, hallucinations, self-injury and suicidal ideas. The patient is not nervous/anxious and is not hyperactive.       Constitutional:  Vitals:  Most recent vital signs were reviewed.   Last 3 sets of Vitals        2023 " "    7:58 AM 10/12/2023     1:30 PM 10/30/2023     7:57 AM   Vitals - 1 value per visit   SYSTOLIC  107 118   DIASTOLIC  71 74   Pulse  79 105   Temp   98.1 °F (36.7 °C)   Resp 16  18   SPO2   99 %   Weight (lb)   236.99   Weight (kg)   107.5   Height 5' 7" (1.702 m)  5' 7" (1.702 m)   BMI (Calculated)   37.1   Pain Score   Seven          Psychiatric:  Oriented: x 3   Attitude: cooperative   Eye Contact: good   Behavior: wnl   Mood: "good overall"  Affect: appropriate range   Attention: intact   Concentration: grossly intact   Thought Process: goal directed   Speech: intelligible  Volume: WNL   Quantity: WNL   Rhythm: WNL  Insight: fair to good   Threats: no SI / HI   Memory: Grossly intact  Psychosis: denies all   Estimate of Intellectual Function: average   Judgment: fair to good    Relevant Elements of Neurological Exam:  leg in brace due to injury, possible fracture      Allergy Review:   Review of patient's allergies indicates:  No Known Allergies     Medical Problem List:   Patient Active Problem List   Diagnosis    Bipolar disorder, in full remission, most recent episode mixed    Idiopathic intracranial hypertension    Presence of ventricular shunt    IUD (intrauterine device) in place    BMI 36.0-36.9,adult    ISAAC (generalized anxiety disorder)    Classical migraine with intractable migraine    Chronic mixed headache syndrome      Encounter Diagnoses   Name Primary?    Attention deficit hyperactivity disorder (ADHD), combined type Yes    History of trauma     Generalized anxiety disorder with panic attacks     Personal history of nonsuicidal self-injury     Insomnia due to mental condition     Eating disorder, unspecified type     Bipolar 1 disorder, depressed, moderate       PLAN:  Medication Management: Continue current medications. Discussed risks, benefits, and alternatives to treatment plan documented above with patient. I answered all patient questions related to this plan, and patient expressed " understanding and agreement.      Follow up in about 4 weeks (around 12/7/2023) for Medication follow up.     Medication List with Changes/Refills   New Medications    DEXTROAMPHETAMINE-AMPHETAMINE (ADDERALL XR) 25 MG 24 HR CAPSULE    Take 1 capsule (25 mg total) by mouth every morning.    LAMOTRIGINE (LAMICTAL) 100 MG TABLET    Take 1 tablet (100 mg total) by mouth once daily.   Current Medications    FOLIC ACID (FOLVITE) 1 MG TABLET    Take 1 tablet (1 mg total) by mouth once daily.    FREMANEZUMAB-VFRM (AJOVY AUTOINJECTOR) 225 MG/1.5 ML AUTOINJECTOR    Inject 1.5 mLs (225 mg total) into the skin every 28 days.    NAPROXEN (NAPROSYN) 500 MG TABLET    Take 1 tablet (500 mg total) by mouth 2 (two) times daily. Take with food for 14 days    ONDANSETRON (ZOFRAN) 4 MG TABLET    Take 1 tablet (4 mg total) by mouth every 8 (eight) hours as needed for Nausea.   Changed and/or Refilled Medications    Modified Medication Previous Medication    ARIPIPRAZOLE (ABILIFY) 10 MG TAB ARIPiprazole (ABILIFY) 5 MG Tab       Take 1 tablet (10 mg total) by mouth once daily.    Take 1 tablet (5 mg total) by mouth once daily.    BUPROPION (WELLBUTRIN XL) 300 MG 24 HR TABLET buPROPion (WELLBUTRIN XL) 300 MG 24 hr tablet       Take 1 tablet (300 mg total) by mouth once daily.    Take 1 tablet (300 mg total) by mouth once daily.    BUSPIRONE (BUSPAR) 15 MG TABLET busPIRone (BUSPAR) 15 MG tablet       Take 1 tablet (15 mg total) by mouth 2 (two) times daily.    Take 1 tablet (15 mg total) by mouth 2 (two) times daily.    DIAZEPAM (VALIUM) 5 MG TABLET diazePAM (VALIUM) 5 MG tablet       Take 1 tablet (5 mg total) by mouth 2 (two) times daily as needed for Anxiety.    Take 1 tablet (5 mg total) by mouth 2 (two) times daily as needed for Anxiety.    TRAZODONE (DESYREL) 100 MG TABLET traZODone (DESYREL) 50 MG tablet       Take 1 tablet (100 mg total) by mouth nightly as needed for Insomnia.    Take 0.5-1 tablets (25-50 mg total) by mouth nightly  as needed for Insomnia.   Discontinued Medications    DEXTROAMPHETAMINE-AMPHETAMINE 10 MG TAB    Take 1 tablet (10 mg total) by mouth 2 (two) times daily.      Time spent with pt including note preparation: 40 minutes     Jessica Warner, PhD, MP  Medical Psychologist

## 2023-11-09 ENCOUNTER — OFFICE VISIT (OUTPATIENT)
Dept: PSYCHIATRY | Facility: CLINIC | Age: 28
End: 2023-11-09
Payer: COMMERCIAL

## 2023-11-09 DIAGNOSIS — F51.05 INSOMNIA DUE TO MENTAL CONDITION: ICD-10-CM

## 2023-11-09 DIAGNOSIS — F50.9 EATING DISORDER, UNSPECIFIED TYPE: ICD-10-CM

## 2023-11-09 DIAGNOSIS — F41.1 GENERALIZED ANXIETY DISORDER WITH PANIC ATTACKS: ICD-10-CM

## 2023-11-09 DIAGNOSIS — F31.32 BIPOLAR 1 DISORDER, DEPRESSED, MODERATE: ICD-10-CM

## 2023-11-09 DIAGNOSIS — Z91.52 PERSONAL HISTORY OF NONSUICIDAL SELF-INJURY: ICD-10-CM

## 2023-11-09 DIAGNOSIS — F41.0 GENERALIZED ANXIETY DISORDER WITH PANIC ATTACKS: ICD-10-CM

## 2023-11-09 DIAGNOSIS — F90.2 ATTENTION DEFICIT HYPERACTIVITY DISORDER (ADHD), COMBINED TYPE: Primary | ICD-10-CM

## 2023-11-09 DIAGNOSIS — Z87.828 HISTORY OF TRAUMA: ICD-10-CM

## 2023-11-09 PROCEDURE — 99215 OFFICE O/P EST HI 40 MIN: CPT | Mod: S$GLB,,, | Performed by: PSYCHOLOGIST

## 2023-11-09 PROCEDURE — 99999 PR PBB SHADOW E&M-EST. PATIENT-LVL II: ICD-10-PCS | Mod: PBBFAC,,, | Performed by: PSYCHOLOGIST

## 2023-11-09 PROCEDURE — 1159F PR MEDICATION LIST DOCUMENTED IN MEDICAL RECORD: ICD-10-PCS | Mod: CPTII,S$GLB,, | Performed by: PSYCHOLOGIST

## 2023-11-09 PROCEDURE — 99999 PR PBB SHADOW E&M-EST. PATIENT-LVL II: CPT | Mod: PBBFAC,,, | Performed by: PSYCHOLOGIST

## 2023-11-09 PROCEDURE — 1159F MED LIST DOCD IN RCRD: CPT | Mod: CPTII,S$GLB,, | Performed by: PSYCHOLOGIST

## 2023-11-09 PROCEDURE — 99215 PR OFFICE/OUTPT VISIT, EST, LEVL V, 40-54 MIN: ICD-10-PCS | Mod: S$GLB,,, | Performed by: PSYCHOLOGIST

## 2023-11-09 RX ORDER — DIAZEPAM 5 MG/1
5 TABLET ORAL 2 TIMES DAILY PRN
Qty: 60 TABLET | Refills: 2 | Status: SHIPPED | OUTPATIENT
Start: 2023-11-09 | End: 2024-01-11 | Stop reason: SDUPTHER

## 2023-11-09 RX ORDER — ARIPIPRAZOLE 10 MG/1
10 TABLET ORAL DAILY
Qty: 90 TABLET | Refills: 0 | Status: SHIPPED | OUTPATIENT
Start: 2023-11-09 | End: 2024-01-11 | Stop reason: SDUPTHER

## 2023-11-09 RX ORDER — TRAZODONE HYDROCHLORIDE 100 MG/1
100 TABLET ORAL NIGHTLY PRN
Qty: 90 TABLET | Refills: 0 | Status: SHIPPED | OUTPATIENT
Start: 2023-11-09 | End: 2024-01-11 | Stop reason: SDUPTHER

## 2023-11-09 RX ORDER — BUSPIRONE HYDROCHLORIDE 15 MG/1
15 TABLET ORAL 2 TIMES DAILY
Qty: 180 TABLET | Refills: 0 | Status: SHIPPED | OUTPATIENT
Start: 2023-11-09 | End: 2024-01-11 | Stop reason: SDUPTHER

## 2023-11-09 RX ORDER — DEXTROAMPHETAMINE SACCHARATE, AMPHETAMINE ASPARTATE MONOHYDRATE, DEXTROAMPHETAMINE SULFATE AND AMPHETAMINE SULFATE 6.25; 6.25; 6.25; 6.25 MG/1; MG/1; MG/1; MG/1
25 CAPSULE, EXTENDED RELEASE ORAL EVERY MORNING
Qty: 30 CAPSULE | Refills: 0 | Status: SHIPPED | OUTPATIENT
Start: 2023-11-09 | End: 2023-12-11 | Stop reason: SDUPTHER

## 2023-11-09 RX ORDER — LAMOTRIGINE 100 MG/1
100 TABLET ORAL DAILY
Qty: 90 TABLET | Refills: 0 | Status: SHIPPED | OUTPATIENT
Start: 2023-11-09 | End: 2024-01-11 | Stop reason: SDUPTHER

## 2023-11-09 RX ORDER — BUPROPION HYDROCHLORIDE 300 MG/1
300 TABLET ORAL DAILY
Qty: 90 TABLET | Refills: 0 | Status: SHIPPED | OUTPATIENT
Start: 2023-11-09 | End: 2024-01-11 | Stop reason: SDUPTHER

## 2023-11-10 ENCOUNTER — OFFICE VISIT (OUTPATIENT)
Dept: PODIATRY | Facility: CLINIC | Age: 28
End: 2023-11-10
Payer: COMMERCIAL

## 2023-11-10 VITALS — WEIGHT: 236 LBS | HEIGHT: 67 IN | BODY MASS INDEX: 37.04 KG/M2

## 2023-11-10 DIAGNOSIS — M25.472 PAIN AND SWELLING OF LEFT ANKLE: ICD-10-CM

## 2023-11-10 DIAGNOSIS — M25.572 PAIN AND SWELLING OF LEFT ANKLE: ICD-10-CM

## 2023-11-10 DIAGNOSIS — S93.412D SPRAIN OF CALCANEOFIBULAR LIGAMENT OF LEFT ANKLE, SUBSEQUENT ENCOUNTER: ICD-10-CM

## 2023-11-10 DIAGNOSIS — S93.492D SPRAIN OF ANTERIOR TALOFIBULAR LIGAMENT OF LEFT ANKLE, SUBSEQUENT ENCOUNTER: Primary | ICD-10-CM

## 2023-11-10 PROCEDURE — 1160F PR REVIEW ALL MEDS BY PRESCRIBER/CLIN PHARMACIST DOCUMENTED: ICD-10-PCS | Mod: CPTII,S$GLB,, | Performed by: PODIATRIST

## 2023-11-10 PROCEDURE — 99204 PR OFFICE/OUTPT VISIT, NEW, LEVL IV, 45-59 MIN: ICD-10-PCS | Mod: S$GLB,,, | Performed by: PODIATRIST

## 2023-11-10 PROCEDURE — 3008F BODY MASS INDEX DOCD: CPT | Mod: CPTII,S$GLB,, | Performed by: PODIATRIST

## 2023-11-10 PROCEDURE — 1159F MED LIST DOCD IN RCRD: CPT | Mod: CPTII,S$GLB,, | Performed by: PODIATRIST

## 2023-11-10 PROCEDURE — 3008F PR BODY MASS INDEX (BMI) DOCUMENTED: ICD-10-PCS | Mod: CPTII,S$GLB,, | Performed by: PODIATRIST

## 2023-11-10 PROCEDURE — 99999 PR PBB SHADOW E&M-EST. PATIENT-LVL IV: ICD-10-PCS | Mod: PBBFAC,,, | Performed by: PODIATRIST

## 2023-11-10 PROCEDURE — 1159F PR MEDICATION LIST DOCUMENTED IN MEDICAL RECORD: ICD-10-PCS | Mod: CPTII,S$GLB,, | Performed by: PODIATRIST

## 2023-11-10 PROCEDURE — 99204 OFFICE O/P NEW MOD 45 MIN: CPT | Mod: S$GLB,,, | Performed by: PODIATRIST

## 2023-11-10 PROCEDURE — 99999 PR PBB SHADOW E&M-EST. PATIENT-LVL IV: CPT | Mod: PBBFAC,,, | Performed by: PODIATRIST

## 2023-11-10 PROCEDURE — 1160F RVW MEDS BY RX/DR IN RCRD: CPT | Mod: CPTII,S$GLB,, | Performed by: PODIATRIST

## 2023-11-10 NOTE — PROGRESS NOTES
Subjective:       Patient ID: Rafaela Zuniga is a 27 y.o. female.    Chief Complaint: Ankle Pain (Left ankle pain and swelling, pt was last seen by PCP Anila Garcia MD at 10/30/2023, rates pain 7/10, nondiabetic, pt is wearing tennis shoes )      HPI: Rafaela Zuniga presents to the clinic today with the complaint of moderate to severe pains to the left ankle, lateral aspect. Patient does state recent trauma. Pains are rated at approx. 7/10. Pains are described as sharp and achy in nature. Walking, standing and prolonged weight bearing activities do exacerbate the symptoms. Patient states prior radiographic evaluation. The patient has been evaluated prior by a medical profession. NSAIDs have been taken for the symptomology. Pains have been present for several weeks. Patient's Primary Care Provider is Anila Garcia MD. Has a history of recurrent inversion sprains to the left and right. States prior PT for the B/L ankle, but does state persistent debility and weakness with gait. States that it is far too easy to twist her ankle(s). States immobilization with CAM Walker and crutches after prior sprains. Was seem by PCP a few weeks ago after the most recent injury, and was fitted with an ASO Brace and given crutches.     Review of patient's allergies indicates:  No Known Allergies    Past Medical History:   Diagnosis Date    Anxiety     Bipolar disorder     Depression     Idiopathic intracranial hypertension     Insomnia        Family History   Problem Relation Age of Onset    Heart disease Mother     Diabetes Maternal Grandmother     Depression Maternal Grandmother     Thyroid disease Maternal Grandmother     Heart disease Maternal Grandfather     Breast cancer Neg Hx     Ovarian cancer Neg Hx     Colon cancer Neg Hx        Social History     Socioeconomic History    Marital status: Single   Tobacco Use    Smoking status: Never    Smokeless tobacco: Never   Substance and Sexual Activity    Alcohol  "use: Yes     Comment: Drinks socially    Drug use: Never    Sexual activity: Yes     Partners: Female, Male     Birth control/protection: I.U.D.       Past Surgical History:   Procedure Laterality Date    brain shunt      eye sheath finistration Bilateral        Review of Systems   Constitutional:  Negative for chills, fatigue and fever.   HENT:  Negative for hearing loss.    Eyes:  Negative for photophobia and visual disturbance.   Respiratory:  Negative for cough, chest tightness, shortness of breath and wheezing.    Cardiovascular:  Negative for chest pain and palpitations.   Gastrointestinal:  Negative for constipation, diarrhea, nausea and vomiting.   Endocrine: Negative for cold intolerance and heat intolerance.   Genitourinary:  Negative for flank pain.   Musculoskeletal:  Positive for gait problem. Negative for neck pain and neck stiffness.   Neurological:  Negative for light-headedness and headaches.   Psychiatric/Behavioral:  Negative for sleep disturbance.          Objective:   Ht 5' 7" (1.702 m)   Wt 107 kg (236 lb)   BMI 36.96 kg/m²     X-Ray Ankle Complete Left  Narrative: EXAM:  XR ANKLE COMPLETE 3 VIEW LEFT    TECHNIQUE: 3 views of the left ankle    CLINICAL HISTORY: RFS#[[id number]]-Unspecified injury of left ankle, initial encounter    FINDINGS:  Suggestion of ankle joint effusion.  No acute fracture.  Joint alignment is anatomic.  No osteochondral defect.  Joint spaces appear relatively well maintained.  Impression: No acute radiographic abnormality of the left ankle.    Finalized on: 10/30/2023 8:45 AM By:  Derek Preston MD  [facility]# [id number]      2023-10-30 08:47:13.130    [facility]      Physical Exam    LOWER EXTREMITY PHYSICAL EXAMINATION    DERMATOLOGY: Skin is supple, dry and intact. There is no ecchymosis noted to the left ankle, lateral aspect.     ORTHOPEDIC: There is no tenderness to palpation of the medial malleolus. There is severe tenderness to palpation of the lateral malleolus. There is " moderate tenderness to palpation of the ATFL. There is moderate pain to palpation of the CFL. There is mild pain to palpation of the PFL. There is no tenderness to palpation of the deltoid ligament complex. Compression of the tibia and fibula at the mid-calf does not produce pains at the distal ankle articulation to suggestion high ankle sprain. There is no tenderness to palpation of the 5th met. base. There is no tenderness to palpation of the navicular tuberosity. There is mild to palpation along the course of the PB and PL tendons. There is mild edema noted to this area. There is mild retro-malleolar edema.  No subluxing peroneals are noted. There is no tenderness to palpation of the course of the posterior tibial tendon. There is no edema noted along its course. There is moderate tenderness to palpation of the anterior lateral ankle gutter. There is mild tenderness to palpation of the anterior medial ankle gutter. Anterior Drawer Testing is WNL. Stress valgus and varus ankle testing is WNL. There is no pain to the area of the sinus tarsi. Rectus  foot type is noted. No palpable foot or ankle fractures are noted. MMT is painful with inversion and eversion with and without resistance and decreased as compared to the contra-lateral. MMT is decreased on the left as compared to the contra-lateral. Patient is able to bear weight. Gait pattern is antalgic.    Assessment:     1. Sprain of anterior talofibular ligament of left ankle, subsequent encounter    2. Sprain of calcaneofibular ligament of left ankle, subsequent encounter    3. Pain and swelling of left ankle          Plan:     Sprain of anterior talofibular ligament of left ankle, subsequent encounter  -     Ambulatory referral/consult to Podiatry  -     MRI Ankle Without Contrast Left; Future; Expected date: 11/10/2023    Sprain of calcaneofibular ligament of left ankle, subsequent encounter  -     MRI Ankle Without Contrast Left; Future; Expected date:  11/10/2023    Pain and swelling of left ankle  -     MRI Ankle Without Contrast Left; Future; Expected date: 11/10/2023      Thorough discussion is had with the patient today, concerning the diagnosis, its etiology, and the treatment algorithm at present.     XRAYS are reviewed in detail with the patient. All questions and concerns regarding findings and its/their implications are outlined and discussed.    Continue ASO Brace for now.    ASO Brace is dispensed to the patient. The ASO Brace is appropriately fitted and customized to the patient's lower extremity physique by the LPN/MA/Ortho Tech. Patient to ambulate with the ASO Brace at all times. The patient should not sleep with the device or shower with the device. The patient should exercise caution when driving with the device, if dispensed for right ankle/foot pathology.     States ample prior PT.    States persistent instability with gait.     States swelling.    Compression Stockings as needed.     Continue NSAIDs.     MRI to R/O ATFL and CFL attenuation and/or OCD lesion./          Future Appointments   Date Time Provider Department Center   11/17/2023  5:00 PM [facility]   12/12/2023  2:00 PM Lupis Valdivia NP [facility]URO [facility]   12/13/2023  2:40 PM Divina Rodriguez MD [facility]   12/20/2023  1:30 PM Claudia Araujo, AMBER [facility] NEUROS8 [facility]   8/1/2024  2:00 PM Brenda Acosta PA-C [facility]  [facility]

## 2023-11-17 ENCOUNTER — HOSPITAL ENCOUNTER (OUTPATIENT)
Dept: RADIOLOGY | Facility: HOSPITAL | Age: 28
Discharge: HOME | End: 2023-11-17
Attending: PODIATRIST
Payer: COMMERCIAL

## 2023-11-17 DIAGNOSIS — S93.412D SPRAIN OF CALCANEOFIBULAR LIGAMENT OF LEFT ANKLE, SUBSEQUENT ENCOUNTER: ICD-10-CM

## 2023-11-17 DIAGNOSIS — M25.472 PAIN AND SWELLING OF LEFT ANKLE: ICD-10-CM

## 2023-11-17 DIAGNOSIS — M25.572 PAIN AND SWELLING OF LEFT ANKLE: ICD-10-CM

## 2023-11-17 DIAGNOSIS — S93.492D SPRAIN OF ANTERIOR TALOFIBULAR LIGAMENT OF LEFT ANKLE, SUBSEQUENT ENCOUNTER: ICD-10-CM

## 2023-11-17 PROCEDURE — 73721 MRI JNT OF LWR EXTRE W/O DYE: CPT | Mod: TC,LT

## 2023-11-17 PROCEDURE — 73721 MRI ANKLE WITHOUT CONTRAST LEFT: ICD-10-PCS | Mod: 26,LT,, | Performed by: RADIOLOGY

## 2023-11-17 PROCEDURE — 73721 MRI JNT OF LWR EXTRE W/O DYE: CPT | Mod: 26,LT,, | Performed by: RADIOLOGY

## 2023-11-20 ENCOUNTER — PATIENT MESSAGE (OUTPATIENT)
Dept: PODIATRY | Facility: CLINIC | Age: 28
End: 2023-11-20
Payer: COMMERCIAL

## 2023-11-20 DIAGNOSIS — M25.472 PAIN AND SWELLING OF LEFT ANKLE: ICD-10-CM

## 2023-11-20 DIAGNOSIS — Z01.818 PREOP EXAMINATION: ICD-10-CM

## 2023-11-20 DIAGNOSIS — S93.412D SPRAIN OF CALCANEOFIBULAR LIGAMENT OF LEFT ANKLE, SUBSEQUENT ENCOUNTER: ICD-10-CM

## 2023-11-20 DIAGNOSIS — S93.492D SPRAIN OF ANTERIOR TALOFIBULAR LIGAMENT OF LEFT ANKLE, SUBSEQUENT ENCOUNTER: Primary | ICD-10-CM

## 2023-11-20 DIAGNOSIS — M25.572 PAIN AND SWELLING OF LEFT ANKLE: ICD-10-CM

## 2023-11-21 DIAGNOSIS — Z01.818 PRE-OP TESTING: Primary | ICD-10-CM

## 2023-12-05 ENCOUNTER — OFFICE VISIT (OUTPATIENT)
Dept: INTERNAL MEDICINE | Facility: CLINIC | Age: 28
End: 2023-12-05
Payer: COMMERCIAL

## 2023-12-05 ENCOUNTER — HOSPITAL ENCOUNTER (OUTPATIENT)
Dept: CARDIOLOGY | Facility: HOSPITAL | Age: 28
Discharge: HOME | End: 2023-12-05
Attending: NURSE PRACTITIONER
Payer: COMMERCIAL

## 2023-12-05 VITALS
DIASTOLIC BLOOD PRESSURE: 74 MMHG | SYSTOLIC BLOOD PRESSURE: 123 MMHG | TEMPERATURE: 98 F | OXYGEN SATURATION: 96 % | HEART RATE: 90 BPM

## 2023-12-05 DIAGNOSIS — G47.00 INSOMNIA, UNSPECIFIED TYPE: ICD-10-CM

## 2023-12-05 DIAGNOSIS — F31.78 BIPOLAR DISORDER, IN FULL REMISSION, MOST RECENT EPISODE MIXED: ICD-10-CM

## 2023-12-05 DIAGNOSIS — G43.119 CLASSICAL MIGRAINE WITH INTRACTABLE MIGRAINE: ICD-10-CM

## 2023-12-05 DIAGNOSIS — Z01.818 PRE-OP TESTING: Primary | ICD-10-CM

## 2023-12-05 DIAGNOSIS — F90.9 ATTENTION DEFICIT HYPERACTIVITY DISORDER (ADHD), UNSPECIFIED ADHD TYPE: ICD-10-CM

## 2023-12-05 DIAGNOSIS — S93.402D SPRAIN OF LEFT ANKLE, UNSPECIFIED LIGAMENT, SUBSEQUENT ENCOUNTER: Primary | ICD-10-CM

## 2023-12-05 DIAGNOSIS — G93.2 IDIOPATHIC INTRACRANIAL HYPERTENSION: ICD-10-CM

## 2023-12-05 DIAGNOSIS — Z01.818 PRE-OP TESTING: ICD-10-CM

## 2023-12-05 DIAGNOSIS — Z01.818 PREOP EXAMINATION: ICD-10-CM

## 2023-12-05 DIAGNOSIS — F41.1 GAD (GENERALIZED ANXIETY DISORDER): ICD-10-CM

## 2023-12-05 PROBLEM — S93.402A LEFT ANKLE SPRAIN: Status: ACTIVE | Noted: 2023-12-05

## 2023-12-05 PROCEDURE — 93010 ELECTROCARDIOGRAM REPORT: CPT | Mod: ,,, | Performed by: INTERNAL MEDICINE

## 2023-12-05 PROCEDURE — 3078F PR MOST RECENT DIASTOLIC BLOOD PRESSURE < 80 MM HG: ICD-10-PCS | Mod: CPTII,S$GLB,, | Performed by: SPECIALIST

## 2023-12-05 PROCEDURE — 99999 PR PBB SHADOW E&M-EST. PATIENT-LVL II: CPT | Mod: PBBFAC,,,

## 2023-12-05 PROCEDURE — 99999 PR PBB SHADOW E&M-EST. PATIENT-LVL II: ICD-10-PCS | Mod: PBBFAC,,,

## 2023-12-05 PROCEDURE — 93005 ELECTROCARDIOGRAM TRACING: CPT

## 2023-12-05 PROCEDURE — 99204 OFFICE O/P NEW MOD 45 MIN: CPT | Mod: S$GLB,,, | Performed by: SPECIALIST

## 2023-12-05 PROCEDURE — 93010 EKG 12-LEAD: ICD-10-PCS | Mod: ,,, | Performed by: INTERNAL MEDICINE

## 2023-12-05 PROCEDURE — 99204 PR OFFICE/OUTPT VISIT, NEW, LEVL IV, 45-59 MIN: ICD-10-PCS | Mod: S$GLB,,, | Performed by: SPECIALIST

## 2023-12-05 PROCEDURE — 3074F PR MOST RECENT SYSTOLIC BLOOD PRESSURE < 130 MM HG: ICD-10-PCS | Mod: CPTII,S$GLB,, | Performed by: SPECIALIST

## 2023-12-05 PROCEDURE — 3078F DIAST BP <80 MM HG: CPT | Mod: CPTII,S$GLB,, | Performed by: SPECIALIST

## 2023-12-05 PROCEDURE — 3074F SYST BP LT 130 MM HG: CPT | Mod: CPTII,S$GLB,, | Performed by: SPECIALIST

## 2023-12-05 NOTE — ASSESSMENT & PLAN NOTE
- follows with Dr. Benjamin, planning on ligament repair 12/15    Known risk factors for perioperative complications: bipolar, depression, obese   Difficulty with intubation is not anticipated.    Cardiac Risk Estimation: Based on the Revised Cardiac Risk index, patient is a Class I risk with a 3.9% risk of a major cardiac event in a low risk procedure.    1.) Preoperative workup as follows: ECG, hemoglobin, hematocrit.  2.) Change in medication regimen before surgery: not on any ASA/NSAID products, hold home Adderall 48 hours prior to sx.    3.) Prophylaxis for cardiac events with perioperative beta-blockers: not indicated.  4.) Invasive hemodynamic monitoring perioperatively: at the discretion of anesthesiologist.  5.) Deep vein thrombosis prophylaxis postoperatively: intermittent pneumatic compression boots and regimen to be chosen by surgical team.  6.) Current medications which may produce withdrawal symptoms if withheld perioperatively: none  8.) Other measures: Postoperative incentive spirometry to prevent pneumonia.

## 2023-12-05 NOTE — PRE-PROCEDURE INSTRUCTIONS
To confirm, Surgery is scheduled on 12/15/23. We will call you late afternoon the business day prior to surgery with your arrival time.    *Please report to the [facility] (1st Floor) located off of [facility] (2nd Entrance/Building on the left, in front of the flag pole).  Address: [address] [place] [address] [place] [id number]        INSTRUCTIONS IMPORTANT!!!  DO NOT Eat, Drink, or Smoke after 12 midnight unless instructed otherwise by your Surgeon. OK to brush teeth, no gum, candy or mints!    MORNING OF SURGERY, drink small sip of water with the following medications instructed by Pre-Admit Provider:  ABILIFY  BUPROPION  BUSPIRONE  LAMICTAL    *Additional Medication Instructions: HOLD ADDERALL 48 HOURS PRIOR TO SURGERY    Diabetic Patients: If you take diabetic or weight loss medication, Do NOT take morning of surgery unless instructed by Doctor. Metformin to be stopped 24 hrs prior to surgery. Ozempic/ Mounjaro/ Wegovy/ Trulicity/ Semaglutide or any weight loss injections to be stopped 7 days prior to surgery. DO NOT take long-acting insulin the evening before surgery. Blood sugars will be checked in pre-op by Nurse.    *Patients should HOLD all vitamins, herbal supplements, weight loss medication, aspirin products & NSAIDS 7 days prior to surgery, as these can thin the blood. Ok to take Tylenol.    ____  Avoid Alcoholic beverages 3 days prior to surgery, as it can thin the blood.  ____  NO Acrylic/fake nails or nail polish worn day of surgery (specifically hand/arm & foot surgeries).  ____  NO powder, lotions, deodorants, oils or cream on body.  ____  Remove all jewelry, piercings, & foreign objects prior to arrival and leave at home.  ____  Remove Dentures, Hearing Aids & Contact Lens prior to surgery.  ____  Bring photo ID and insurance information to hospital (Leave Valuables at Home).  ____  If going home the same day, arrange for a ride home. You will not be able to drive for 24 hrs if  Anesthesia was used.   ____  Females (ages 11-60): may need to give a urine sample the morning of surgery; please see Pre op Nurse prior to using the restroom.  ____  Males: Stop ED medications (Viagra, Cialis) 24 hrs prior to surgery.  ____  Wear clean, loose fitting clothing to allow for dressings/ bandages.      Bathing Instructions:    -Shower with anti-bacterial Soap (Hibiclens or Dial) the night before surgery and the morning of.   -Do not use Hibiclens on your face or genitals.   -Apply clean clothes after shower.  -Do not shave your face or body 2 days prior to surgery unless instructed otherwise by your Surgeon.  -Do not shave pubic hair 7 days prior to surgery (gyn pt's).    [facility] Visitor/Ride Policy:  Only 2 adults allowed in pre op/recovery area during your procedure. You MUST HAVE A RIDE HOME from a responsible adult that you know and trust. Medical Transport, Uber or Lyft can ONLY be used if patient has a responsible adult to accompany them during ride home.    Discharge Instructions: You will receive Post-op/Discharge instructions by your Discharge Nurse prior to going home.   *Prevention of surgical site infections:   -Keep incisions clean and dry.   -Do not soak/submerge incisions in water until completely healed.   -Do not apply lotions, powders, creams, or deodorants to site.   -Always make sure hands are cleaned with antibacterial soap/ alcohol-based  prior to touching the surgical site.        *Signs and symptoms of Infection Before or After Surgery:               !!!If you experience any fever, chills, nausea/ vomiting, foul odor/ excessive drainage from surgical site, flu-like symptoms, new wounds or cuts, PLEASE CALL THE SURGEON OFFICE at [phone] or SEND MESSAGE THROUGH [facility] PORTAL!!!       *If you are running late day of surgery, please call the Surgery Dept @ [phone].    *Billing question, please call  [phone]       Thank you,  -[facility] Pre  Admit Dept.  [phone] or [phone]  M-F 7:30 am-4:00 pm (Closed Major Holidays)    Additional Tests Scheduled Today:  LABS (1st Floor) Check in at the !       EKG (4TH Floor) Check in at the !

## 2023-12-05 NOTE — PROGRESS NOTES
Preoperative History and Physical                                                                 Chief Complaint: Preoperative evaluation     History of Present Illness:      Rafaela Zuniga is a 27 y.o. female with a history of bipolar, ADHD, anxiety, insomnia,  shunt who presents to the office today for a preoperative consultation at the request of Dr. Benjamin who plans on performing ligament repair on December 8.     Functional Status:      The patient is able to climb a flight of stairs. The patient is able to ambulate without difficulty. Patient is a teacher, on her feet most of the day.  The patient's functional status is affected by the surgical problem. The patient's functional status is not affected by shortness of breath, chest pain, dyspnea on exertion and fatigue.    MET score greater than 4    Past Medical History:      Past Medical History:   Diagnosis Date    ADHD     Anxiety     Bipolar disorder     Depression     Idiopathic intracranial hypertension     Insomnia         Past Surgical History:      Past Surgical History:   Procedure Laterality Date    brain shunt  2017    eye sheath finistration Bilateral         Social History:      Social History     Socioeconomic History    Marital status: Single   Tobacco Use    Smoking status: Never    Smokeless tobacco: Never   Substance and Sexual Activity    Alcohol use: Yes     Comment: Drinks socially    Drug use: Never    Sexual activity: Yes     Partners: Female, Male     Birth control/protection: I.U.D.        Family History:      Family History   Problem Relation Age of Onset    Heart disease Mother     Cancer Father     Diabetes Maternal Grandmother     Depression Maternal Grandmother     Thyroid disease Maternal Grandmother     Heart disease Maternal Grandfather     Breast cancer Neg Hx     Ovarian cancer Neg Hx     Colon cancer Neg Hx        Allergies:      Review of patient's allergies  indicates:  No Known Allergies    Medications:      Current Outpatient Medications   Medication Sig    ARIPiprazole (ABILIFY) 10 MG Tab Take 1 tablet (10 mg total) by mouth once daily.    buPROPion (WELLBUTRIN XL) 300 MG 24 hr tablet Take 1 tablet (300 mg total) by mouth once daily.    busPIRone (BUSPAR) 15 MG tablet Take 1 tablet (15 mg total) by mouth 2 (two) times daily.    dextroamphetamine-amphetamine (ADDERALL XR) 25 MG 24 hr capsule Take 1 capsule (25 mg total) by mouth every morning.    diazePAM (VALIUM) 5 MG tablet Take 1 tablet (5 mg total) by mouth 2 (two) times daily as needed for Anxiety.    fremanezumab-vfrm (AJOVY AUTOINJECTOR) 225 mg/1.5 mL autoinjector Inject 1.5 mLs (225 mg total) into the skin every 28 days.    lamoTRIgine (LAMICTAL) 100 MG tablet Take 1 tablet (100 mg total) by mouth once daily.    ondansetron (ZOFRAN) 4 MG tablet Take 1 tablet (4 mg total) by mouth every 8 (eight) hours as needed for Nausea.    traZODone (DESYREL) 100 MG tablet Take 1 tablet (100 mg total) by mouth nightly as needed for Insomnia.     No current facility-administered medications for this visit.       Vitals:      Vitals:    12/05/23 1502   BP: 123/74   Pulse: 90   Temp: 97.6 °F (36.4 °C)       Review of Systems:        Constitutional: Negative for fever, chills, weight loss, malaise/fatigue and diaphoresis.   HENT: Negative for hearing loss, ear pain, nosebleeds, congestion, sore throat, neck pain, tinnitus and ear discharge.    Eyes: Negative for blurred vision, double vision, photophobia, pain, discharge and redness.   Respiratory: Negative for cough, hemoptysis, sputum production, shortness of breath, wheezing and stridor.    Cardiovascular: Negative for chest pain, palpitations, orthopnea, claudication, leg swelling and PND.   Gastrointestinal: Negative for heartburn, nausea, vomiting, abdominal pain, diarrhea, constipation, blood in stool and melena.   Genitourinary: Negative for dysuria, urgency, frequency,  hematuria and flank pain.   Musculoskeletal: Left ankle pain Negative for myalgias, back pain,  and falls.   Skin: Negative for itching and rash.   Neurological: Negative for dizziness, tingling, tremors, sensory change, speech change, focal weakness, seizures, loss of consciousness, weakness and headaches.   Endo/Heme/Allergies: Negative for environmental allergies and polydipsia. Does not bruise/bleed easily.   Psychiatric/Behavioral: Negative for depression, suicidal ideas, hallucinations, memory loss and substance abuse. The patient is not nervous/anxious and does not have insomnia.    All 14 systems reviewed and negative except as noted above.    Physical Exam:      Constitutional: Appears well-developed, well-nourished and in no acute distress.  Patient is oriented to person, place, and time.   Head: Normocephalic and atraumatic. Mucous membranes moist.  Neck: Neck supple no mass.   Cardiovascular: Normal rate and regular rhythm.  S1 S2 appreciated by ascultation.  Pulmonary/Chest: Effort normal and clear to auscultation bilaterally. No respiratory distress.   Abdomen: Soft. Non-tender and non-distended. Bowel sounds are normal.   Neurological: Patient is alert and oriented to person, place and time. Moves all extremities.  Skin: Warm and dry. No lesions.  Extremities: No clubbing, cyanosis or edema.    Laboratory data:      Reviewed and noted in plan where applicable. Please see chart for full laboratory data.    Lab Results   Component Value Date    WBC 11.65 12/05/2023    HGB 12.3 12/05/2023    HCT 39.2 12/05/2023    MCV 88 12/05/2023     12/05/2023     Sodium   Date Value Ref Range Status   08/05/2023 141 136 - 145 mmol/L Final     Chloride   Date Value Ref Range Status   08/05/2023 105 95 - 110 mmol/L Final     CO2   Date Value Ref Range Status   08/05/2023 24 23 - 29 mmol/L Final     Glucose   Date Value Ref Range Status   08/05/2023 76 70 - 110 mg/dL Final     BUN   Date Value Ref Range Status    08/05/2023 13 6 - 20 mg/dL Final     Creatinine   Date Value Ref Range Status   08/05/2023 0.9 0.5 - 1.4 mg/dL Final     Calcium   Date Value Ref Range Status   08/05/2023 9.2 8.7 - 10.5 mg/dL Final     Total Protein   Date Value Ref Range Status   08/05/2023 7.2 6.0 - 8.4 g/dL Final     Albumin   Date Value Ref Range Status   08/05/2023 3.6 3.5 - 5.2 g/dL Final     Total Bilirubin   Date Value Ref Range Status   08/05/2023 0.2 0.1 - 1.0 mg/dL Final     Comment:     For infants and newborns, interpretation of results should be based  on gestational age, weight and in agreement with clinical  observations.    Premature Infant recommended reference ranges:  Up to 24 hours.............<8.0 mg/dL  Up to 48 hours............<12.0 mg/dL  3-5 days..................<15.0 mg/dL  6-29 days.................<15.0 mg/dL       Alkaline Phosphatase   Date Value Ref Range Status   08/05/2023 78 55 - 135 U/L Final     AST   Date Value Ref Range Status   08/05/2023 20 10 - 40 U/L Final     ALT   Date Value Ref Range Status   08/05/2023 16 10 - 44 U/L Final     Anion Gap   Date Value Ref Range Status   08/05/2023 12 8 - 16 mmol/L Final     eGFR   Date Value Ref Range Status   08/05/2023 >60.0 >60 mL/min/1.73 m^2 Final           Predictors of intubation difficulty:       Morbid obesity? yes - BMI 36   Anatomically abnormal facies? no   Prominent incisors? no   Receding mandible? no   Short, thick neck? no   Neck range of motion: normal   Dentition: No chipped, loose, or missing teeth.  Based on the Modified Mallampati, patient is a mallampati score: I (soft palate, uvula, fauces, and tonsillar pillars visible)    Cardiographics:      ECG: normal sinus rhythm, no blocks or conduction defects, no ischemic changes    Echocardiogram: not indicated    Imaging:      Chest x-ray: not indicated    Assessment and Plan:      Left ankle sprain  - follows with Dr. Benjamin, planning on ligament repair 12/15    Known risk factors for perioperative  complications: bipolar, depression, obese   Difficulty with intubation is not anticipated.    Cardiac Risk Estimation: Based on the Revised Cardiac Risk index, patient is a Class I risk with a 3.9% risk of a major cardiac event in a low risk procedure.    1.) Preoperative workup as follows: ECG, hemoglobin, hematocrit.  2.) Change in medication regimen before surgery: not on any ASA/NSAID products, hold home Adderall 48 hours prior to sx.    3.) Prophylaxis for cardiac events with perioperative beta-blockers: not indicated.  4.) Invasive hemodynamic monitoring perioperatively: at the discretion of anesthesiologist.  5.) Deep vein thrombosis prophylaxis postoperatively: intermittent pneumatic compression boots and regimen to be chosen by surgical team.  6.) Current medications which may produce withdrawal symptoms if withheld perioperatively: none  8.) Other measures: Postoperative incentive spirometry to prevent pneumonia.      Idiopathic intracranial hypertension  - Idiopathic intracranial HTN, PKA PSEUDOTUMOR CEREBRI  - s/p right ventriculoperitoneal shunt (VPS) in 2017 at [facility]     Classical migraine with intractable migraine  - follows with Dr. Alba for migraines, on monthly ajovy    Bipolar disorder, in full remission, most recent episode mixed  - follows with psychiatry, on Abilify Wellbutrin, Lamictal and Buspar - will take these am of surgery       ADHD  - will hold home adderral 48 hours prior to procedure     Insomnia  - trazodone as needed nightly     ISAAC (generalized anxiety disorder)  - valium as needed        Electronically signed by Charmaine Harris MSN, FNP-C on 12/5/2023 at 3:10 PM.

## 2023-12-05 NOTE — ASSESSMENT & PLAN NOTE
- follows with psychiatry, on Abilify Wellbutrin, Lamictal and Buspar - will take these am of surgery

## 2023-12-05 NOTE — H&P (VIEW-ONLY)
Preoperative History and Physical                                                                 Chief Complaint: Preoperative evaluation     History of Present Illness:      Rafaela Zuniga is a 27 y.o. female with a history of bipolar, ADHD, anxiety, insomnia,  shunt who presents to the office today for a preoperative consultation at the request of Dr. Benjamin who plans on performing ligament repair on December 8.     Functional Status:      The patient is able to climb a flight of stairs. The patient is able to ambulate without difficulty. Patient is a teacher, on her feet most of the day.  The patient's functional status is affected by the surgical problem. The patient's functional status is not affected by shortness of breath, chest pain, dyspnea on exertion and fatigue.    MET score greater than 4    Past Medical History:      Past Medical History:   Diagnosis Date    ADHD     Anxiety     Bipolar disorder     Depression     Idiopathic intracranial hypertension     Insomnia         Past Surgical History:      Past Surgical History:   Procedure Laterality Date    brain shunt  2017    eye sheath finistration Bilateral         Social History:      Social History     Socioeconomic History    Marital status: Single   Tobacco Use    Smoking status: Never    Smokeless tobacco: Never   Substance and Sexual Activity    Alcohol use: Yes     Comment: Drinks socially    Drug use: Never    Sexual activity: Yes     Partners: Female, Male     Birth control/protection: I.U.D.        Family History:      Family History   Problem Relation Age of Onset    Heart disease Mother     Cancer Father     Diabetes Maternal Grandmother     Depression Maternal Grandmother     Thyroid disease Maternal Grandmother     Heart disease Maternal Grandfather     Breast cancer Neg Hx     Ovarian cancer Neg Hx     Colon cancer Neg Hx        Allergies:      Review of patient's allergies  indicates:  No Known Allergies    Medications:      Current Outpatient Medications   Medication Sig    ARIPiprazole (ABILIFY) 10 MG Tab Take 1 tablet (10 mg total) by mouth once daily.    buPROPion (WELLBUTRIN XL) 300 MG 24 hr tablet Take 1 tablet (300 mg total) by mouth once daily.    busPIRone (BUSPAR) 15 MG tablet Take 1 tablet (15 mg total) by mouth 2 (two) times daily.    dextroamphetamine-amphetamine (ADDERALL XR) 25 MG 24 hr capsule Take 1 capsule (25 mg total) by mouth every morning.    diazePAM (VALIUM) 5 MG tablet Take 1 tablet (5 mg total) by mouth 2 (two) times daily as needed for Anxiety.    fremanezumab-vfrm (AJOVY AUTOINJECTOR) 225 mg/1.5 mL autoinjector Inject 1.5 mLs (225 mg total) into the skin every 28 days.    lamoTRIgine (LAMICTAL) 100 MG tablet Take 1 tablet (100 mg total) by mouth once daily.    ondansetron (ZOFRAN) 4 MG tablet Take 1 tablet (4 mg total) by mouth every 8 (eight) hours as needed for Nausea.    traZODone (DESYREL) 100 MG tablet Take 1 tablet (100 mg total) by mouth nightly as needed for Insomnia.     No current facility-administered medications for this visit.       Vitals:      Vitals:    12/05/23 1502   BP: 123/74   Pulse: 90   Temp: 97.6 °F (36.4 °C)       Review of Systems:        Constitutional: Negative for fever, chills, weight loss, malaise/fatigue and diaphoresis.   HENT: Negative for hearing loss, ear pain, nosebleeds, congestion, sore throat, neck pain, tinnitus and ear discharge.    Eyes: Negative for blurred vision, double vision, photophobia, pain, discharge and redness.   Respiratory: Negative for cough, hemoptysis, sputum production, shortness of breath, wheezing and stridor.    Cardiovascular: Negative for chest pain, palpitations, orthopnea, claudication, leg swelling and PND.   Gastrointestinal: Negative for heartburn, nausea, vomiting, abdominal pain, diarrhea, constipation, blood in stool and melena.   Genitourinary: Negative for dysuria, urgency, frequency,  hematuria and flank pain.   Musculoskeletal: Left ankle pain Negative for myalgias, back pain,  and falls.   Skin: Negative for itching and rash.   Neurological: Negative for dizziness, tingling, tremors, sensory change, speech change, focal weakness, seizures, loss of consciousness, weakness and headaches.   Endo/Heme/Allergies: Negative for environmental allergies and polydipsia. Does not bruise/bleed easily.   Psychiatric/Behavioral: Negative for depression, suicidal ideas, hallucinations, memory loss and substance abuse. The patient is not nervous/anxious and does not have insomnia.    All 14 systems reviewed and negative except as noted above.    Physical Exam:      Constitutional: Appears well-developed, well-nourished and in no acute distress.  Patient is oriented to person, place, and time.   Head: Normocephalic and atraumatic. Mucous membranes moist.  Neck: Neck supple no mass.   Cardiovascular: Normal rate and regular rhythm.  S1 S2 appreciated by ascultation.  Pulmonary/Chest: Effort normal and clear to auscultation bilaterally. No respiratory distress.   Abdomen: Soft. Non-tender and non-distended. Bowel sounds are normal.   Neurological: Patient is alert and oriented to person, place and time. Moves all extremities.  Skin: Warm and dry. No lesions.  Extremities: No clubbing, cyanosis or edema.    Laboratory data:      Reviewed and noted in plan where applicable. Please see chart for full laboratory data.    Lab Results   Component Value Date    WBC 11.65 12/05/2023    HGB 12.3 12/05/2023    HCT 39.2 12/05/2023    MCV 88 12/05/2023     12/05/2023     Sodium   Date Value Ref Range Status   08/05/2023 141 136 - 145 mmol/L Final     Chloride   Date Value Ref Range Status   08/05/2023 105 95 - 110 mmol/L Final     CO2   Date Value Ref Range Status   08/05/2023 24 23 - 29 mmol/L Final     Glucose   Date Value Ref Range Status   08/05/2023 76 70 - 110 mg/dL Final     BUN   Date Value Ref Range Status    08/05/2023 13 6 - 20 mg/dL Final     Creatinine   Date Value Ref Range Status   08/05/2023 0.9 0.5 - 1.4 mg/dL Final     Calcium   Date Value Ref Range Status   08/05/2023 9.2 8.7 - 10.5 mg/dL Final     Total Protein   Date Value Ref Range Status   08/05/2023 7.2 6.0 - 8.4 g/dL Final     Albumin   Date Value Ref Range Status   08/05/2023 3.6 3.5 - 5.2 g/dL Final     Total Bilirubin   Date Value Ref Range Status   08/05/2023 0.2 0.1 - 1.0 mg/dL Final     Comment:     For infants and newborns, interpretation of results should be based  on gestational age, weight and in agreement with clinical  observations.    Premature Infant recommended reference ranges:  Up to 24 hours.............<8.0 mg/dL  Up to 48 hours............<12.0 mg/dL  3-5 days..................<15.0 mg/dL  6-29 days.................<15.0 mg/dL       Alkaline Phosphatase   Date Value Ref Range Status   08/05/2023 78 55 - 135 U/L Final     AST   Date Value Ref Range Status   08/05/2023 20 10 - 40 U/L Final     ALT   Date Value Ref Range Status   08/05/2023 16 10 - 44 U/L Final     Anion Gap   Date Value Ref Range Status   08/05/2023 12 8 - 16 mmol/L Final     eGFR   Date Value Ref Range Status   08/05/2023 >60.0 >60 mL/min/1.73 m^2 Final           Predictors of intubation difficulty:       Morbid obesity? yes - BMI 36   Anatomically abnormal facies? no   Prominent incisors? no   Receding mandible? no   Short, thick neck? no   Neck range of motion: normal   Dentition: No chipped, loose, or missing teeth.  Based on the Modified Mallampati, patient is a mallampati score: I (soft palate, uvula, fauces, and tonsillar pillars visible)    Cardiographics:      ECG: normal sinus rhythm, no blocks or conduction defects, no ischemic changes    Echocardiogram: not indicated    Imaging:      Chest x-ray: not indicated    Assessment and Plan:      Left ankle sprain  - follows with Dr. Benjamin, planning on ligament repair 12/15    Known risk factors for perioperative  complications: bipolar, depression, obese   Difficulty with intubation is not anticipated.    Cardiac Risk Estimation: Based on the Revised Cardiac Risk index, patient is a Class I risk with a 3.9% risk of a major cardiac event in a low risk procedure.    1.) Preoperative workup as follows: ECG, hemoglobin, hematocrit.  2.) Change in medication regimen before surgery: not on any ASA/NSAID products, hold home Adderall 48 hours prior to sx.    3.) Prophylaxis for cardiac events with perioperative beta-blockers: not indicated.  4.) Invasive hemodynamic monitoring perioperatively: at the discretion of anesthesiologist.  5.) Deep vein thrombosis prophylaxis postoperatively: intermittent pneumatic compression boots and regimen to be chosen by surgical team.  6.) Current medications which may produce withdrawal symptoms if withheld perioperatively: none  8.) Other measures: Postoperative incentive spirometry to prevent pneumonia.      Idiopathic intracranial hypertension  - Idiopathic intracranial HTN, PKA PSEUDOTUMOR CEREBRI  - s/p right ventriculoperitoneal shunt (VPS) in 2017 at [facility]     Classical migraine with intractable migraine  - follows with Dr. Alba for migraines, on monthly ajovy    Bipolar disorder, in full remission, most recent episode mixed  - follows with psychiatry, on Abilify Wellbutrin, Lamictal and Buspar - will take these am of surgery       ADHD  - will hold home adderral 48 hours prior to procedure     Insomnia  - trazodone as needed nightly     ISAAC (generalized anxiety disorder)  - valium as needed        Electronically signed by Charmaine Harris MSN, FNP-C on 12/5/2023 at 3:10 PM.

## 2023-12-05 NOTE — ASSESSMENT & PLAN NOTE
- Idiopathic intracranial HTN, PKA PSEUDOTUMOR CEREBRI  - s/p right ventriculoperitoneal shunt (VPS) in 2017 at [facility]

## 2023-12-05 NOTE — DISCHARGE INSTRUCTIONS
Pre op instructions reviewed with patient during Clinic Visit with Provider, verbalized understanding.    To confirm, Surgery is scheduled on 12/15/23. We will call you late afternoon the business day prior to surgery with your arrival time.    *Please report to the [facility]obby (1st Floor) located off of [facility] (2nd Entrance/Building on the left, in front of the flag pole).  Address: [address] [place] [address] [place] [id number]        INSTRUCTIONS IMPORTANT!!!  DO NOT Eat, Drink, or Smoke after 12 midnight unless instructed otherwise by your Surgeon. OK to brush teeth, no gum, candy or mints!    MORNING OF SURGERY, drink small sip of water with the following medications instructed by Pre-Admit Provider:  ABILIFY  BUPROPION  BUSPIRONE  LAMICTAL    *Additional Medication Instructions: HOLD ADDERALL 48 HOURS PRIOR TO SURGERY    Diabetic Patients: If you take diabetic or weight loss medication, Do NOT take morning of surgery unless instructed by Doctor. Metformin to be stopped 24 hrs prior to surgery. Ozempic/ Mounjaro/ Wegovy/ Trulicity/ Semaglutide or any weight loss injections to be stopped 7 days prior to surgery. DO NOT take long-acting insulin the evening before surgery. Blood sugars will be checked in pre-op by Nurse.    *Patients should HOLD all vitamins, herbal supplements, weight loss medication, aspirin products & NSAIDS 7 days prior to surgery, as these can thin the blood. Ok to take Tylenol.    ____  Avoid Alcoholic beverages 3 days prior to surgery, as it can thin the blood.  ____  NO Acrylic/fake nails or nail polish worn day of surgery (specifically hand/arm & foot surgeries).  ____  NO powder, lotions, deodorants, oils or cream on body.  ____  Remove all jewelry, piercings, & foreign objects prior to arrival and leave at home.  ____  Remove Dentures, Hearing Aids & Contact Lens prior to surgery.  ____  Bring photo ID and insurance information to hospital (Leave Valuables at  Home).  ____  If going home the same day, arrange for a ride home. You will not be able to drive for 24 hrs if Anesthesia was used.   ____  Females (ages 11-60): may need to give a urine sample the morning of surgery; please see Pre op Nurse prior to using the restroom.  ____  Males: Stop ED medications (Viagra, Cialis) 24 hrs prior to surgery.  ____  Wear clean, loose fitting clothing to allow for dressings/ bandages.      Bathing Instructions:    -Shower with anti-bacterial Soap (Hibiclens or Dial) the night before surgery and the morning of.   -Do not use Hibiclens on your face or genitals.   -Apply clean clothes after shower.  -Do not shave your face or body 2 days prior to surgery unless instructed otherwise by your Surgeon.  -Do not shave pubic hair 7 days prior to surgery (gyn pt's).    [facility] Visitor/Ride Policy:  Only 2 adults allowed in pre op/recovery area during your procedure. You MUST HAVE A RIDE HOME from a responsible adult that you know and trust. Medical Transport, Uber or Lyft can ONLY be used if patient has a responsible adult to accompany them during ride home.    Discharge Instructions: You will receive Post-op/Discharge instructions by your Discharge Nurse prior to going home.   *Prevention of surgical site infections:   -Keep incisions clean and dry.   -Do not soak/submerge incisions in water until completely healed.   -Do not apply lotions, powders, creams, or deodorants to site.   -Always make sure hands are cleaned with antibacterial soap/ alcohol-based  prior to touching the surgical site.        *Signs and symptoms of Infection Before or After Surgery:               !!!If you experience any fever, chills, nausea/ vomiting, foul odor/ excessive drainage from surgical site, flu-like symptoms, new wounds or cuts, PLEASE CALL THE SURGEON OFFICE at [phone] or SEND MESSAGE THROUGH [employer]!!!       *If you are running late day of surgery, please call the Surgery Dept @  [phone].    *Billing question, please call  [phone]       Thank you,  -[facility] Pre Admit Dept.  [phone] or [phone]  M-F 7:30 am-4:00 pm (Closed Major Holidays)    Additional Tests Scheduled Today:  LABS (1st Floor) Check in at the !       EKG (4TH Floor) Check in at the !

## 2023-12-08 ENCOUNTER — TELEPHONE (OUTPATIENT)
Dept: PSYCHIATRY | Facility: CLINIC | Age: 28
End: 2023-12-08
Payer: COMMERCIAL

## 2023-12-08 NOTE — PROGRESS NOTES
"MEDICATION MANAGEMENT SESSION    Name: Rafaela Zuniga  Age: 27 y.o.  : 1995    Preferred Name: Rafaela    Referring provider: Brenda Acosta PA-C    Reason for Visit:  Medication follow up    Summary of Visit:  Pt reports improvement in her ADHD symptoms with increase to Adderall XR 25 mg qd but continues to be distracted and s/w unfocused at times throughout the day. She would like to discuss increase to 30 mg. Her mood has been stable and "good" since resuming Lamictal, she has noticed a difference and reports no manic/hypomanic symptoms since last visit. She is sleeping well with Trazodone 100 mg qhs. Her anxiety has been manageable, and she has not experienced panic symptoms. Her foot/ankle injury requires surgery scheduled this Friday 12/15/23. She will then take time off for the holidays and her birthday on  before school resumes in 2024.    Current Symptoms:   ADHD: inattentive, easily distracted   Depressive Disorder: denied   Anxiety Disorder: anxiety/nervousness   Panic Disorder: denied   Manic Disorder: denied   Psychotic Disorder: denied   Substance Use:  Alcohol: Socially once per month at most, 1-2 glasses of wine.     Review of Systems   Constitutional:  Negative for activity change, appetite change, fatigue and unexpected weight change.   Respiratory:  Negative for shortness of breath.    Psychiatric/Behavioral:  Positive for decreased concentration. Negative for agitation, behavioral problems, confusion, dysphoric mood, hallucinations, self-injury, sleep disturbance and suicidal ideas. The patient is not nervous/anxious and is not hyperactive.       Constitutional:  Vitals:  Most recent vital signs were reviewed.   Last 3 sets of Vitals        10/30/2023     7:57 AM 11/10/2023     2:03 PM 2023     3:02 PM   Vitals - 1 value per visit   SYSTOLIC 118  123   DIASTOLIC 74  74   Pulse 105  90   Temp 98.1 °F (36.7 °C)  97.6 °F (36.4 °C)   Resp 18     SPO2 99 %  96 %   Weight " "(lb) 236.99 236    Weight (kg) 107.5 107.049    Height 5' 7" (1.702 m) 5' 7" (1.702 m)    BMI (Calculated) 37.1 37    Pain Score Seven Seven           Psychiatric:  Oriented: x 3   Attitude: cooperative   Eye Contact: good   Behavior: wnl   Mood: "good"  Affect: appropriate range   Attention: intact   Concentration: grossly intact   Thought Process: goal directed   Speech: intelligible  Volume: WNL   Quantity: WNL   Rhythm: WNL  Insight: fair to good   Threats: no SI / HI   Memory: Grossly intact  Psychosis: denies all   Estimate of Intellectual Function: average   Judgment: fair to good    Relevant Elements of Neurological Exam: normal gait     Allergy Review:   Review of patient's allergies indicates:  No Known Allergies     Medical Problem List:   Patient Active Problem List   Diagnosis    Bipolar disorder, in full remission, most recent episode mixed    Idiopathic intracranial hypertension    Presence of ventricular shunt    IUD (intrauterine device) in place    BMI 36.0-36.9,adult    ISAAC (generalized anxiety disorder)    Classical migraine with intractable migraine    Chronic mixed headache syndrome    Left ankle sprain    Insomnia    ADHD      Encounter Diagnoses   Name Primary?    Attention deficit hyperactivity disorder (ADHD), combined type Yes    Personal history of nonsuicidal self-injury     Bipolar 1 disorder, depressed, moderate     Insomnia due to mental condition     History of trauma     Generalized anxiety disorder with panic attacks     Eating disorder, unspecified type     ISAAC (generalized anxiety disorder)       PLAN:  Medication Management: Continue current medications. Discussed risks, benefits, and alternatives to treatment plan documented above with patient. I answered all patient questions related to this plan, and patient expressed understanding and agreement.      Follow up in about 4 weeks (around 1/8/2024) for Medication follow up.     Medication List with Changes/Refills   Current " Medications    ARIPIPRAZOLE (ABILIFY) 10 MG TAB    Take 1 tablet (10 mg total) by mouth once daily.    BUPROPION (WELLBUTRIN XL) 300 MG 24 HR TABLET    Take 1 tablet (300 mg total) by mouth once daily.    BUSPIRONE (BUSPAR) 15 MG TABLET    Take 1 tablet (15 mg total) by mouth 2 (two) times daily.    DIAZEPAM (VALIUM) 5 MG TABLET    Take 1 tablet (5 mg total) by mouth 2 (two) times daily as needed for Anxiety.    FREMANEZUMAB-VFRM (AJOVY AUTOINJECTOR) 225 MG/1.5 ML AUTOINJECTOR    Inject 1.5 mLs (225 mg total) into the skin every 28 days.    LAMOTRIGINE (LAMICTAL) 100 MG TABLET    Take 1 tablet (100 mg total) by mouth once daily.    ONDANSETRON (ZOFRAN) 4 MG TABLET    Take 1 tablet (4 mg total) by mouth every 8 (eight) hours as needed for Nausea.    TRAZODONE (DESYREL) 100 MG TABLET    Take 1 tablet (100 mg total) by mouth nightly as needed for Insomnia.   Changed and/or Refilled Medications    Modified Medication Previous Medication    DEXTROAMPHETAMINE-AMPHETAMINE (ADDERALL XR) 30 MG 24 HR CAPSULE dextroamphetamine-amphetamine (ADDERALL XR) 25 MG 24 hr capsule       Take 1 capsule (30 mg total) by mouth every morning.    Take 1 capsule (25 mg total) by mouth every morning.      Time spent with pt including note preparation: 30 minutes     Jessica Warner, PhD, MP  Medical Psychologist

## 2023-12-11 ENCOUNTER — OFFICE VISIT (OUTPATIENT)
Dept: PSYCHIATRY | Facility: CLINIC | Age: 28
End: 2023-12-11
Payer: COMMERCIAL

## 2023-12-11 DIAGNOSIS — F41.1 GAD (GENERALIZED ANXIETY DISORDER): ICD-10-CM

## 2023-12-11 DIAGNOSIS — F51.05 INSOMNIA DUE TO MENTAL CONDITION: ICD-10-CM

## 2023-12-11 DIAGNOSIS — F90.2 ATTENTION DEFICIT HYPERACTIVITY DISORDER (ADHD), COMBINED TYPE: Primary | ICD-10-CM

## 2023-12-11 DIAGNOSIS — F41.1 GENERALIZED ANXIETY DISORDER WITH PANIC ATTACKS: ICD-10-CM

## 2023-12-11 DIAGNOSIS — F50.9 EATING DISORDER, UNSPECIFIED TYPE: ICD-10-CM

## 2023-12-11 DIAGNOSIS — F31.32 BIPOLAR 1 DISORDER, DEPRESSED, MODERATE: ICD-10-CM

## 2023-12-11 DIAGNOSIS — F41.0 GENERALIZED ANXIETY DISORDER WITH PANIC ATTACKS: ICD-10-CM

## 2023-12-11 DIAGNOSIS — Z91.52 PERSONAL HISTORY OF NONSUICIDAL SELF-INJURY: ICD-10-CM

## 2023-12-11 DIAGNOSIS — Z87.828 HISTORY OF TRAUMA: ICD-10-CM

## 2023-12-11 PROCEDURE — 99999 PR PBB SHADOW E&M-EST. PATIENT-LVL I: ICD-10-PCS | Mod: PBBFAC,,, | Performed by: PSYCHOLOGIST

## 2023-12-11 PROCEDURE — 1159F PR MEDICATION LIST DOCUMENTED IN MEDICAL RECORD: ICD-10-PCS | Mod: CPTII,S$GLB,, | Performed by: PSYCHOLOGIST

## 2023-12-11 PROCEDURE — 1159F MED LIST DOCD IN RCRD: CPT | Mod: CPTII,S$GLB,, | Performed by: PSYCHOLOGIST

## 2023-12-11 PROCEDURE — 99214 PR OFFICE/OUTPT VISIT, EST, LEVL IV, 30-39 MIN: ICD-10-PCS | Mod: S$GLB,,, | Performed by: PSYCHOLOGIST

## 2023-12-11 PROCEDURE — 99214 OFFICE O/P EST MOD 30 MIN: CPT | Mod: S$GLB,,, | Performed by: PSYCHOLOGIST

## 2023-12-11 PROCEDURE — 99999 PR PBB SHADOW E&M-EST. PATIENT-LVL I: CPT | Mod: PBBFAC,,, | Performed by: PSYCHOLOGIST

## 2023-12-11 RX ORDER — DEXTROAMPHETAMINE SACCHARATE, AMPHETAMINE ASPARTATE MONOHYDRATE, DEXTROAMPHETAMINE SULFATE AND AMPHETAMINE SULFATE 7.5; 7.5; 7.5; 7.5 MG/1; MG/1; MG/1; MG/1
30 CAPSULE, EXTENDED RELEASE ORAL EVERY MORNING
Qty: 30 CAPSULE | Refills: 0 | Status: SHIPPED | OUTPATIENT
Start: 2023-12-11 | End: 2024-01-11 | Stop reason: SDUPTHER

## 2023-12-12 ENCOUNTER — OFFICE VISIT (OUTPATIENT)
Dept: NEUROLOGY | Facility: CLINIC | Age: 28
End: 2023-12-12
Payer: COMMERCIAL

## 2023-12-12 DIAGNOSIS — Z97.5 IUD (INTRAUTERINE DEVICE) IN PLACE: ICD-10-CM

## 2023-12-12 DIAGNOSIS — G43.119 CLASSICAL MIGRAINE WITH INTRACTABLE MIGRAINE: ICD-10-CM

## 2023-12-12 DIAGNOSIS — F41.1 GAD (GENERALIZED ANXIETY DISORDER): ICD-10-CM

## 2023-12-12 DIAGNOSIS — G93.2 IDIOPATHIC INTRACRANIAL HYPERTENSION: Primary | ICD-10-CM

## 2023-12-12 DIAGNOSIS — G47.00 INSOMNIA, UNSPECIFIED TYPE: ICD-10-CM

## 2023-12-12 DIAGNOSIS — Z98.2 PRESENCE OF VENTRICULAR SHUNT: ICD-10-CM

## 2023-12-12 DIAGNOSIS — F31.78 BIPOLAR DISORDER, IN FULL REMISSION, MOST RECENT EPISODE MIXED: ICD-10-CM

## 2023-12-12 DIAGNOSIS — G44.89 CHRONIC MIXED HEADACHE SYNDROME: ICD-10-CM

## 2023-12-12 DIAGNOSIS — Z79.899 USE OF MEDICATION WITH TERATOGENIC POTENTIAL IN FEMALE OF REPRODUCTIVE AGE: ICD-10-CM

## 2023-12-12 PROCEDURE — 1159F MED LIST DOCD IN RCRD: CPT | Mod: CPTII,95,, | Performed by: NURSE PRACTITIONER

## 2023-12-12 PROCEDURE — 99213 PR OFFICE/OUTPT VISIT, EST, LEVL III, 20-29 MIN: ICD-10-PCS | Mod: 95,,, | Performed by: NURSE PRACTITIONER

## 2023-12-12 PROCEDURE — 99213 OFFICE O/P EST LOW 20 MIN: CPT | Mod: 95,,, | Performed by: NURSE PRACTITIONER

## 2023-12-12 PROCEDURE — 1159F PR MEDICATION LIST DOCUMENTED IN MEDICAL RECORD: ICD-10-PCS | Mod: CPTII,95,, | Performed by: NURSE PRACTITIONER

## 2023-12-12 PROCEDURE — 1160F RVW MEDS BY RX/DR IN RCRD: CPT | Mod: CPTII,95,, | Performed by: NURSE PRACTITIONER

## 2023-12-12 PROCEDURE — 1160F PR REVIEW ALL MEDS BY PRESCRIBER/CLIN PHARMACIST DOCUMENTED: ICD-10-PCS | Mod: CPTII,95,, | Performed by: NURSE PRACTITIONER

## 2023-12-12 RX ORDER — RIMEGEPANT SULFATE 75 MG/75MG
75 TABLET, ORALLY DISINTEGRATING ORAL
Qty: 8 TABLET | Refills: 2 | Status: SHIPPED | OUTPATIENT
Start: 2023-12-12

## 2023-12-12 RX ORDER — FREMANEZUMAB-VFRM 225 MG/1.5ML
225 INJECTION SUBCUTANEOUS
Qty: 1 EACH | Refills: 11 | Status: SHIPPED | OUTPATIENT
Start: 2023-12-12

## 2023-12-12 NOTE — PROGRESS NOTES
"Subjective:       Patient ID: Rafeala Zuniga is a 27 y.o. female.    Chief Complaint: classical migraine with intractable migraine , Medication Refill, and Migraine          HPI          The patient presented virtually on 08- for evaluation of chronic mixed headaches.        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 CSF OP was in the 30's and did not respond to Diamox and TPM so she underwent RT VPS in 2017 which helped tremendously. No known history of SLE/AID. No confusion. No LOC/ELIZABETH. No seizures. No history of hypercoagulability.No excessive vitamin A intake/Accutane. No Doxycycline. No pregnancy. No BCPs/OCPs. Currently uses IUCD. On 05- Dr. Richardson NL Optic Disc No papilledema . On 05- Shunt Series Intact RT VPS and Brain MRV NL.      The patient suffered a different headaches since childhood. The headaches progress from different areas and involves different sides with a throbbing nature. It can involve the right side, the left side or both sides preceded by visual aura. No associated neurological deficits. History is significant for prodromal irritability and urinary frequency.  The headaches are getting more frequent and come every other day since . The headaches are also getting more severe 6-8/10 headaches that cause significant morbidity. The headache is associated with nausea and light, sound sensitivity. The patient also vomits occasionally.  The patient feels "down" during the headache with no racing. Physical and emotional stressors provoke and aggravate the headache.  The headache builds up slowly towards the middle of the day or the end of the day.   The headaches are progressively getting worse and interfering with daily activity.  The headache is associated with scalp sensitivity. Lack of sleep is a significant trigger of the " headache.  No neck pain with radiation. No TMJ problems.  The patient denies blurry vision and ear ringing with these headaches. The headache is not positional or postural but worsens with movement and Valsalva. Sleep help lessen the headache. No history of head trauma. No history of seizures. No history of smoking. No caffeine overuse. No vertigo. No blacking out.  No fever, chills, or rigors. No history of significant memory loss. No history of strokes.  The patient cannot think of food that aggravates the headache. Family history is remarkable for migraine (mother). No family history of early dementia.    Abortive therapies (tried and failed): OTC Medications, Caffeine, Triptans (Imitrex, Maxalt), Tramadol       Preventative therapies (tried and failed): AEDs (TPM, Diamox, LTG), BBs (Inderal, Toprol), CCBs (Verapamil), TCA (Elavil), SSRIs.       INTERVAL HISTORY 12-: Patient is new to me but known to Dr. Alba. Present for BROWN management. Patient insurance did not approve Aimovig. Patient started on Ajovy/ Ligand Blocker 225 mg SQ monthly no side effects reported, However patient has only completed one dose at this time. Patient reports Nurtec 75 mg po prn was never received. Patient reports increased frequency and intensity of headaches.     The originating site (patient location) is: Home.      The distant site (neurologist location) is: [facility] at [facility].      The chief complaint leading to consultation is: HEADACHES       Visit type: Virtual visit with synchronous audio and video.      Consent: The patient verbally consented to participating in the video visit and informed that may decline to receive medical services by telemedicine and may withdraw from such care at any time.      I discussed with the patient the nature of our telemedicine visits, that:      I  would evaluate the patient and recommend diagnostics and treatments based on my assessment.    Our sessions are not being  recorded and that personal health information is protected.    Our team would provide follow up care in person if/when the patient needs it.    Virtual (video/telemedicine) visits have significant limitations. A telemedicine exam is primarily focused on the history and what I can observe. Several critical parts of the neurological exam cannot be performed.           Review of Systems   Constitutional:  Negative for appetite change and fatigue.   HENT:  Negative for hearing loss and tinnitus.    Eyes:  Negative for photophobia and visual disturbance.   Respiratory:  Negative for apnea and shortness of breath.    Cardiovascular:  Negative for chest pain and palpitations.   Gastrointestinal:  Negative for nausea and vomiting.   Endocrine: Negative for cold intolerance and heat intolerance.   Genitourinary:  Negative for difficulty urinating and urgency.   Musculoskeletal:  Negative for arthralgias, back pain, gait problem, joint swelling, myalgias, neck pain and neck stiffness.   Skin:  Negative for color change and rash.   Allergic/Immunologic: Negative for environmental allergies and immunocompromised state.   Neurological:  Positive for headaches. Negative for dizziness, tremors, seizures, syncope, facial asymmetry, speech difficulty, weakness, light-headedness and numbness.   Hematological:  Negative for adenopathy. Does not bruise/bleed easily.   Psychiatric/Behavioral:  Negative for agitation, behavioral problems, confusion, decreased concentration, dysphoric mood, hallucinations, self-injury, sleep disturbance and suicidal ideas. The patient is not hyperactive.                            Current Outpatient Medications:     ARIPiprazole (ABILIFY) 10 MG Tab, Take 1 tablet (10 mg total) by mouth once daily., Disp: 90 tablet, Rfl: 0    buPROPion (WELLBUTRIN XL) 300 MG 24 hr tablet, Take 1 tablet (300 mg total) by mouth once daily., Disp: 90 tablet, Rfl: 0    busPIRone (BUSPAR) 15 MG tablet, Take 1 tablet (15 mg  total) by mouth 2 (two) times daily., Disp: 180 tablet, Rfl: 0    dextroamphetamine-amphetamine (ADDERALL XR) 30 MG 24 hr capsule, Take 1 capsule (30 mg total) by mouth every morning., Disp: 30 capsule, Rfl: 0    diazePAM (VALIUM) 5 MG tablet, Take 1 tablet (5 mg total) by mouth 2 (two) times daily as needed for Anxiety., Disp: 60 tablet, Rfl: 2    fremanezumab-vfrm (AJOVY AUTOINJECTOR) 225 mg/1.5 mL autoinjector, Inject 1.5 mLs (225 mg total) into the skin every 28 days., Disp: 1 each, Rfl: 11    lamoTRIgine (LAMICTAL) 100 MG tablet, Take 1 tablet (100 mg total) by mouth once daily., Disp: 90 tablet, Rfl: 0    ondansetron (ZOFRAN) 4 MG tablet, Take 1 tablet (4 mg total) by mouth every 8 (eight) hours as needed for Nausea., Disp: 30 tablet, Rfl: 0    rimegepant (NURTEC) 75 mg odt, Take 1 tablet (75 mg total) by mouth every 72 hours as needed for Migraine (2 to 3 time per week prn max). Place ODT tablet on the tongue; alternatively the ODT tablet may be placed under the tongue, Disp: 8 tablet, Rfl: 2    traZODone (DESYREL) 100 MG tablet, Take 1 tablet (100 mg total) by mouth nightly as needed for Insomnia., Disp: 90 tablet, Rfl: 0    Past Medical History:   Diagnosis Date    ADHD     Anxiety     Bipolar disorder     Depression     Idiopathic intracranial hypertension     Insomnia        Past Surgical History:   Procedure Laterality Date    brain shunt  2017    eye sheath finistration Bilateral        Social History     Socioeconomic History    Marital status: Single   Tobacco Use    Smoking status: Never    Smokeless tobacco: Never   Substance and Sexual Activity    Alcohol use: Yes     Comment: Drinks socially    Drug use: Never    Sexual activity: Yes     Partners: Female, Male     Birth control/protection: I.U.D.             Past/Current Medical/Surgical History, Past/Current Social History, Past/Current Family History and Past/Current Medications were reviewed in detail.        Objective:                 GENERAL  APPEARANCE:           The patient looks comfortable.    BMI 36.91    No signs of respiratory distress.    Normal breathing pattern.    No dysmorphic features    Normal eye contact.         GENERAL MEDICAL EXAM:    HEENT:  Head is atraumatic normocephalic.      Neck and Axillae: No JVD. No visible lesions.    Cardiopulmonary: No cyanosis. No tachypnea. Normal respiratory effort.    Gastrointestinal/Urogenital:  No jaundice. No stomas or lesions. No visible hernias. No catheters.     Skin, Hair and Nails: No pathognonomic skin rash. No neurofibromatosis. No visible lesions.No stigmata of autoimmune disease. No clubbing.    Limbs: No varicose veins. No visible swelling.    Muskoskeletal: No visible deformities.No visible lesions.             Neurologic Exam     Mental Status   Oriented to person, place, and time.   Follows 3 step commands.   Attention: normal. Concentration: normal.   Speech: speech is normal   Level of consciousness: alert  Able to name object. Able to repeat. Normal comprehension.     Cranial Nerves     CN III, IV, VI   Extraocular motions are normal.   CN III: no CN III palsy  CN VI: no CN VI palsy  Nystagmus: none   Diplopia: none  Ophthalmoparesis: none  Upgaze: normal  Downgaze: normal  Conjugate gaze: present    CN VII   Facial expression full, symmetric.   Right facial weakness: none  Left facial weakness: none    CN VIII   CN VIII normal.   Hearing: intact    CN IX, X   CN IX normal.   CN X normal.   Palate: symmetric    CN XII   CN XII normal.   Tongue: not atrophic  Fasciculations: absent  Tongue deviation: none    Motor Exam   Muscle bulk: normal  Right arm pronator drift: absent  Left arm pronator drift: absent  Moves BUE BLE Symmetrically.      Gait, Coordination, and Reflexes     Gait  Gait: normal    Coordination   Romberg: negative  Heel to shin coordination: normal  Tandem walking coordination: normal    Tremor   Resting tremor: absent  Intention tremor: absent  Action tremor:  absent      Lab Results   Component Value Date    WBC 11.65 12/05/2023    HGB 12.3 12/05/2023    HCT 39.2 12/05/2023    MCV 88 12/05/2023     12/05/2023       Sodium   Date Value Ref Range Status   08/05/2023 141 136 - 145 mmol/L Final     Potassium   Date Value Ref Range Status   08/05/2023 4.2 3.5 - 5.1 mmol/L Final     Chloride   Date Value Ref Range Status   08/05/2023 105 95 - 110 mmol/L Final     CO2   Date Value Ref Range Status   08/05/2023 24 23 - 29 mmol/L Final     Glucose   Date Value Ref Range Status   08/05/2023 76 70 - 110 mg/dL Final     BUN   Date Value Ref Range Status   08/05/2023 13 6 - 20 mg/dL Final     Creatinine   Date Value Ref Range Status   08/05/2023 0.9 0.5 - 1.4 mg/dL Final     Calcium   Date Value Ref Range Status   08/05/2023 9.2 8.7 - 10.5 mg/dL Final     Total Protein   Date Value Ref Range Status   08/05/2023 7.2 6.0 - 8.4 g/dL Final     Albumin   Date Value Ref Range Status   08/05/2023 3.6 3.5 - 5.2 g/dL Final     Total Bilirubin   Date Value Ref Range Status   08/05/2023 0.2 0.1 - 1.0 mg/dL Final     Comment:     For infants and newborns, interpretation of results should be based  on gestational age, weight and in agreement with clinical  observations.    Premature Infant recommended reference ranges:  Up to 24 hours.............<8.0 mg/dL  Up to 48 hours............<12.0 mg/dL  3-5 days..................<15.0 mg/dL  6-29 days.................<15.0 mg/dL       Alkaline Phosphatase   Date Value Ref Range Status   08/05/2023 78 55 - 135 U/L Final     AST   Date Value Ref Range Status   08/05/2023 20 10 - 40 U/L Final     ALT   Date Value Ref Range Status   08/05/2023 16 10 - 44 U/L Final     Anion Gap   Date Value Ref Range Status   08/05/2023 12 8 - 16 mmol/L Final       Lab Results   Component Value Date    TSH 1.338 08/05/2023             LABORATORY EVALUATION    08-    STEVIE -ve, RF <13.0  level NL     Labs WNL     08-    CBC, CMP, TFT, HIV, HCV Unremarkable          RADIOLOGY EVALUATION       PRIOR RECORDS (MD MONSTER)    CSF OP 30's cm H2O    S/P RT VPS in 2017      05-    CTH RT VPS.    Shunt Series Intact RT VPS    Brain MRV NL                 NEUROPHYSIOLOGY AND NEURO OPHTHALMOLOGY EVALUATION       05-    Dr. Richardson NL Optic Disc No papilledema         PATHOLOGY EVALUATION        NEUROCOGNITIVE AND NEUROPSYCHOLOGY EVALUATION     Reviewed the neuroimaging independently       Assessment:           1. Idiopathic intracranial hypertension    2. Classical migraine with intractable migraine    3. Chronic mixed headache syndrome    4. Bipolar disorder, in full remission, most recent episode mixed    5. Presence of ventricular shunt    6. IUD (intrauterine device) in place    7. BMI 36.0-36.9,adult    8. ISAAC (generalized anxiety disorder)    9. 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 Insomnia, unspecified type            Plan:               IDIOPATHIC INTRACRANIAL HYPERTENSION (IIH) PKA PSEUDOTUMOR CEREBRI (PTC)    STATUS POST RIGHT VENTRICULOPERITONEAL SHUNT (VPS) IN 2017, WELL-CONTROLLED       Continue neurosurgical surveillance.     AID IMANI ZHENG.    Weight loss.      MIGRAINE, CLASSICAL, WITH AURA, EPISODIC, HIGH FREQUENCY           MANAGEMENT       HEADACHE DIARY     DISCUSSED THE THREE-FOLD MANAGEMENT OF MIGRAINE:      LIFESTYLE CHANGES:       Good sleep hygiene  Avoid general triggers like lack of sleep/too much sleep, prolonged sun exposure, excessive screen time and specific triggers based on you own diary   Minimize physical and emotional stress  Smoking avoidance and cessation  Limit caffeine drinks to 1-2 a day   Good hydration   Small frequent meals and avoid skipping meals   Moderate 30-minute-long aerobic exercises 3 times/week. Avoid strenuous exercise         ABORTIVE MEDICATIONS (ACUTE-RESCUE MEDICATIONS):     Should only be taken 2-3 times/week to avoid rebound and overuse headaches.    Try Nuretc  (rimegepant)75 mg ODT PRN.    Abortive therapies (tried and failed): OTC Medications, Caffeine, Triptans (Imitrex, Maxalt), Tramadol         AVOID NARCOTICS (OPIATES)      1. No randomized controlled study shows pain-free results with opioids in the treatment of migraine.     2. The physiologic consequences of opioid use are adverse, occur quickly, and can be permanent. Decreased gray matter, release of calcitonin gene-related peptide, dynorphin, and pro-inflammatory peptides, and activation of excitatory glutamate receptors are all associated with opioid exposure.     3. Opioids are pro-nociceptive, prevent reversal of migraine central sensitization, and interfere with triptan effectiveness.     4.Opioids precipitate bad clinical outcomes, especially transformation to daily headache.     5. They cause disease progression, comorbidity, and excessive health care consumption.           NEXT OPTIONS:    Gepants:Ubrelvy (ubrogepant) 100 mg    Ditans: Reyvow (lasmiditan) 100 mg (No driving due to sedation)      LAST RESORT:     DHE NS Trudhesa (Max 2 a week)     C/I: concomitant use of vasoconstrictors like Triptans, strong CY inhibitors such as HAART PIs (eg, ritonavir, nelfinavir, or indinavir) and Macrolides (eg, erythromycin or clarithromycin), CAD, PVD, Stroke/TIA and Uncontrolled HTN.  Serious SEs include Vasospasm and Fibrosis (chronic use).       PREVENTATIVE (MORE ACCURATELY MIGRAINE REDUCTION) MEDICATIONS:           Since the patient's headache is very frequent a lengthy discussion about preventative medications was carried out.The patient understands that prevention means DECREASING frequency and severity and NOT elimination.The patient was made aware that any new medication can cause serious allergic reaction.The medication is considered failure only if a therapeutic dose reached and maintained for 6-8 weeks.        NEUROPHARMACOLOGY       Continue Fremnezumab (Ajovy) (Ligand Blocker): 225 mg SQ monthly      Preventative therapies (tried and failed): AEDs (TPM, Diamox, LTG), BBs (Inderal, Toprol), CCBs (Verapamil), TCA (Elavil), SSRIs. Insurance did not Aimovig       HELPFUL SUPPLEMENTS:     Helpful supplements include Co-Q 10, B2, Mg, Feverfew (Dolovent combination) and butterbur (Petadolex)        NEXT OPTIONS:       ANTI-CGRP AGENTS: Qulipta (alogepant) 60 mg QD, Galcanezumab (Emgality) 120 mg SQ Pen monthly after a loading dose of 240 mg  and Fremnezumab (Ajovy) (Ligand Blocker): 225 mg SQ monthly or 675 mg every 3 months     Botox 200 units every 3 months .      LAST RESORT OPTIONS:      Namenda 10 mg BID     Valproic acid/ Depakote         NEUROMODULATION     Cefaly, Relivion, Nerivio and GammaCore (VNS)             WOMEN IN CHILD BEARING PERIOD     All migraine medications are not safe during pregnancy and the patient was made aware of this fact. Any pregnancy should be planned, and medications should be stopped PRIOR to pregnancy planning. Folic acid 1 mg daily was recommended. However, hormonal birth control complicates the management of migraine and can exacerbate migraine. If possible, mechanical contraception should be a better option.                     MEDICAL/SURGICAL COMORBIDITIES     All relevant medical comorbidities noted and managed by primary care physician and medical care team.          HEALTHY LIFESTYLE AND PREVENTATIVE CARE    The patient to adhere to the age-appropriate health maintenance guidelines including screening tests and vaccinations. The patient to adhere to  healthy lifestyle, optimal weight, exercise, healthy diet, good sleep hygiene and avoiding drugs including smoking, alcohol and recreational drugs.                RTC in 3 months          Desi Valdivia MSN NP      Collaborating Provider: Mehran Alba MD, FAAN Neurologist/Epileptologist        I spent a total of 20 minutes on the day of the visit.  This includes face to face time and non-face to face time preparing to see the  patient (eg, review of tests), obtaining and/or reviewing separately obtained history, documenting clinical information in the electronic or other health record, independently interpreting results and communicating results to the patient/family/caregiver, or care coordinator.

## 2023-12-13 ENCOUNTER — OFFICE VISIT (OUTPATIENT)
Dept: PRIMARY CARE CLINIC | Facility: CLINIC | Age: 28
End: 2023-12-13
Payer: COMMERCIAL

## 2023-12-13 DIAGNOSIS — F90.9 ATTENTION DEFICIT HYPERACTIVITY DISORDER (ADHD), UNSPECIFIED ADHD TYPE: ICD-10-CM

## 2023-12-13 DIAGNOSIS — R63.5 WEIGHT GAIN: ICD-10-CM

## 2023-12-13 DIAGNOSIS — Z83.3 FAMILY HISTORY OF DIABETES MELLITUS (DM): ICD-10-CM

## 2023-12-13 DIAGNOSIS — E66.9 CLASS 2 OBESITY WITH BODY MASS INDEX (BMI) OF 36.0 TO 36.9 IN ADULT, UNSPECIFIED OBESITY TYPE, UNSPECIFIED WHETHER SERIOUS COMORBIDITY PRESENT: ICD-10-CM

## 2023-12-13 DIAGNOSIS — Z97.5 IUD (INTRAUTERINE DEVICE) IN PLACE: ICD-10-CM

## 2023-12-13 DIAGNOSIS — F31.78 BIPOLAR DISORDER, IN FULL REMISSION, MOST RECENT EPISODE MIXED: ICD-10-CM

## 2023-12-13 PROCEDURE — 99214 OFFICE O/P EST MOD 30 MIN: CPT | Mod: 95,,, | Performed by: FAMILY MEDICINE

## 2023-12-13 PROCEDURE — 99214 PR OFFICE/OUTPT VISIT, EST, LEVL IV, 30-39 MIN: ICD-10-PCS | Mod: 95,,, | Performed by: FAMILY MEDICINE

## 2023-12-13 RX ORDER — METFORMIN HYDROCHLORIDE 500 MG/1
TABLET, EXTENDED RELEASE ORAL
Qty: 60 TABLET | Refills: 2 | Status: SHIPPED | OUTPATIENT
Start: 2023-12-13 | End: 2024-03-01 | Stop reason: SDUPTHER

## 2023-12-13 NOTE — PROGRESS NOTES
Subjective     [place]  The chief complaint leading to consultation is: weight gain     Visit type: audiovisual    Face to Face time with patient: 25  32 minutes of total time spent on the encounter, which includes face to face time and non-face to face time preparing to see the patient (eg, review of tests), Obtaining and/or reviewing separately obtained history, Documenting clinical information in the electronic or other health record, Independently interpreting results (not separately reported) and communicating results to the patient/family/caregiver, or Care coordination (not separately reported).         Each patient to whom he or she provides medical services by telemedicine is:  (1) informed of the relationship between the physician and patient and the respective role of any other health care provider with respect to management of the patient; and (2) notified that he or she may decline to receive medical services by telemedicine and may withdraw from such care at any time.    Notes:    Pmhx, fam hx, soc hx, surg hx, allergies, med list reviewed     Referring MD: Timmy  PCP: Radha   BMI noted 36  Diet:variable; pt is doing well  Exercise/Activity:3-4 times/week with cardio and resistance training   Likes HIIT  Sleep: good t  Trazodone prn  Stressors: none new  Anxiety/Depression Screen/PHQ-2: denies        Patient ID: Rafaela Zuniga is a 27 y.o. female.    Chief Complaint: follow up    Pt will have L ankle surgery (Dr. Benjamin). Walking boot for a few weeks.     Hx of ADD, bipolar. Sees Dr. Warner.    Has taken phentermine on/off for years. She has trouble feeling full. When stops is not able to take it. Was offered bariatric surgery in the past but declines now.     Highest weight was 270 and lowest ws 195. She feels like does well with portion sizes, eating well. Is familiar with calorie restriction. Not a snacker.     With HA, did try topamax. Has idioapthic intracranial  hypertension. Made dizzy.   Has IUD for birth control. Recent visit with neurology.     Started adderall for ADD; has helped some with appetite but is not an appetite suppressant.     Not a big meat eater.  Overall picky eater. Has just added some vegetables.   Likes eggs    Does not like peanut butter      Food recall:  Bfast: coffee granola bar  Lunch: salad with chicken and salmon  Dinner:Wed have class or has options at home  chipotle  Snack:string cheese  Water:adequate  Sugar Sweetened beverages: none  Etoh: limited    Likes pasta    HPI       Objective     PAST MEDICAL HISTORY:  Past Medical History:   Diagnosis Date    ADHD     Anxiety     Bipolar disorder     Depression     Idiopathic intracranial hypertension     Insomnia          PAST SURGICAL HISTORY:  Past Surgical History:   Procedure Laterality Date    brain shunt  2017    eye sheath finistration Bilateral     REPAIR OF LIGAMENT OF ANKLE Left 12/15/2023    Procedure: REPAIR, LIGAMENT, ANKLE;  Surgeon: Brandon Benjamin DPM;  Location: [facility] OR;  Service: Podiatry;  Laterality: Left;    REPAIR, TENDON, FLEXOR, LOWER EXTREMITY, SECONDARY, USING GRAFT IF INDICATED Left 12/15/2023    Procedure: REPAIR, TENDON, FLEXOR, LOWER EXTREMITY, SECONDARY, USING GRAFT IF INDICATED;  Surgeon: Brandon Benjamin DPM;  Location: [facility] OR;  Service: Podiatry;  Laterality: Left;       FAMILY HISTORY:  Family History   Problem Relation Age of Onset    Heart disease Mother     Cancer Father     Diabetes Maternal Grandmother     Depression Maternal Grandmother     Thyroid disease Maternal Grandmother     Heart disease Maternal Grandfather     Breast cancer Neg Hx     Ovarian cancer Neg Hx     Colon cancer Neg Hx           SOCIAL HISTORY:  Social History     Social History Narrative    Not on file       MEDICATIONS:  Medications have been reviewed.    ALLERGIES:  Allergies have been reviewed.    There were no vitals filed for this visit.  Wt Readings from Last 10 Encounters:    12/20/23 111.1 kg (245 lb)   12/15/23 112.7 kg (248 lb 7.3 oz)   11/10/23 107 kg (236 lb)   10/30/23 107.5 kg (236 lb 15.9 oz)   08/01/23 106.9 kg (235 lb 10.8 oz)   07/18/23 105.2 kg (231 lb 15.8 oz)   05/29/23 99 kg (218 lb 4.1 oz)   05/25/23 97.9 kg (215 lb 13.3 oz)   04/25/23 101.5 kg (223 lb 10.5 oz)   02/22/23 106.5 kg (234 lb 14.4 oz)       Lab Results   Component Value Date    WBC 11.65 12/05/2023    HGB 12.3 12/05/2023    HCT 39.2 12/05/2023     12/05/2023    CHOL 215 (H) 08/05/2023    TRIG 67 08/05/2023    HDL 56 08/05/2023    ALT 16 08/05/2023    AST 20 08/05/2023     08/05/2023    K 4.2 08/05/2023     08/05/2023    CREATININE 0.9 08/05/2023    BUN 13 08/05/2023    CO2 24 08/05/2023    TSH 1.338 08/05/2023       Review of Systems   Constitutional:  Negative for activity change, appetite change, fatigue and fever.   HENT:  Negative for mouth dryness and goiter.    Eyes:  Negative for visual disturbance.   Respiratory:  Negative for apnea, cough, chest tightness and shortness of breath.    Cardiovascular:  Negative for chest pain, palpitations and leg swelling.   Gastrointestinal:  Negative for abdominal pain, constipation and diarrhea.   Endocrine: Negative for cold intolerance, heat intolerance, polydipsia, polyphagia and polyuria.   Genitourinary:  Negative for frequency and menstrual problem.   Musculoskeletal:  Negative for arthralgias and myalgias.   Integumentary:  Negative for color change and rash.   Psychiatric/Behavioral:  Negative for sleep disturbance. The patient is not nervous/anxious.        Physical Exam  Constitutional:       General: She is not in acute distress.     Appearance: Normal appearance.   HENT:      Head: Normocephalic and atraumatic.   Eyes:      General: No scleral icterus.  Pulmonary:      Effort: Pulmonary effort is normal. No respiratory distress.   Neurological:      Mental Status: She is alert and oriented to person, place, and time.   Psychiatric:          Mood and Affect: Mood normal.         Behavior: Behavior normal.              Assessment and Plan     1. Bipolar disorder, in full remission, most recent episode mixed  Overview:  Previously followed by Psychiatry      2. Attention deficit hyperactivity disorder (ADHD), unspecified ADHD type  Chronic/stble    3. Weight gain  -     Hemoglobin A1C; Future; Expected date: 12/13/2023  -     Insulin, Random; Future; Expected date: 12/13/2023    4. Family history of diabetes mellitus (DM)  -     Hemoglobin A1C; Future; Expected date: 12/13/2023  -     Insulin, Random; Future; Expected date: 12/13/2023  -     metFORMIN (GLUCOPHAGE-XR) 500 MG ER 24hr tablet; Take 1 tablet (500 mg total) by mouth once daily for 14 days, THEN 1 tablet (500 mg total) 2 (two) times daily with meals for 14 days.  Dispense: 60 tablet; Refill: 2    5. Class 2 obesity with body mass index (BMI) of 36.0 to 36.9 in adult, unspecified obesity type, unspecified whether serious comorbidity present  -     metFORMIN (GLUCOPHAGE-XR) 500 MG ER 24hr tablet; Take 1 tablet (500 mg total) by mouth once daily for 14 days, THEN 1 tablet (500 mg total) 2 (two) times daily with meals for 14 days.  Dispense: 60 tablet; Refill: 2    6. IUD (intrauterine device) in place         Reviewed with pt risks/benefits/se's of naltrexone and metformin; pt expressed understanding      Reviewed with pt glp1  Follow up in about 8 weeks (around 2/7/2024), or if symptoms worsen or fail to improve.

## 2023-12-13 NOTE — PATIENT INSTRUCTIONS
"Goal: < 25 grams added sugar daily   Protein: 30 grams with each meal at least          PRODUCE  [] All fresh fruit   [] All fresh vegetables   [] All fresh herbs  [] All herb purees + pastes  [] Pre-spiralized vegetable noodles   [] Steam-In-The-Bag begetables  [] Riced cauliflower  [] Jicama sticks  [] Love Beets  all varieties  [] Wholly Guacamole  all varieties  [] Hummus  all varieties, chickpea + vegetable  [] Tofu Shirataki noodles    [] Tofu  all varieties  [] Tempeh  all varieties    PROTEIN  CHICKEN   [] Boneless, skinless breasts  [] Boneless, skinless thighs  [] Ground chicken breast, at least 93% lean  [] Chicken breast cutlet  [] Aidell's  Chicken Sausage + Chicken Meatballs    TURKEY   [] Turkey breast tenderloin   [] Ground turkey breast, at least 93% lean  [] [place] Naturals  Turkey Sausage    BEEF  [] Tenderloin  [] Sirloin  [] Top Loin  [] Flank Steak  [] Round Steak  [] Filet  [] Lean ground beef, at least 93% lean + grass-fed preferable    PORK  [] Tenderloin  [] Pork Chop  [] Center Cut  [] [place] Naturals  No-Sugar [place]    BISON  [] [place]  90 - 95% lean    SEAFOOD  [] All fresh fish + seafood; locally sourced when possible  [] Smoked salmon    HEAT + EAT ENTREES   [] Tomi's Natural Foods  Chicken, Pork, Beef  [] Richard  "All Natural" Grilled Chicken Breast + Strips, all varieties    SAUCES SPREADS + DIPS  [] Bitchin Sauce  Original, Chipotle, Cilantro [place]  [] Srinath's Kitchen  Tzatziki Yogurt Dip, Babaganoush, Hummus  [] Wholly Guacamole  all varieties  [] Hummus  all varieties  [] Liberty Gringo Salsa  all varieties  [] Mrs. Axel's Salsa  all varieties  [] Stubb's All Natural BBQ Sauce  [] Primal Kitchen  [place], Ketchup, BBQ Sauce  [] Primal Kitchen Pasta Sauce  Roasted Garlic, Tomato Basil, no-dairy Vodka Sauce  [] Sal & Guadalupe's  HeartSmart Pasta Sauce    DAIRY/DAIRY SUBSTITUTES/EGGS  EGGS   [] All eggs  cage-free, pasture-raise preferable  [] Crepini  egg " wraps  [] Vital Farms  Pasture-Raised Egg Bites  [] JUST Egg [vegan]     CREAMERS   [] Califia  Better Half, original + vanilla unsweetened  [] NutPods  all varieties    MILK   [] Horizon Organic  all varieties except chocolate  [] Organic Valley  all varieties except chocolate  [] Organic Valley  ultra-filtered, reduced fat milk     PLANT_BASED MILK ALTERNATIVES  [] All unsweetened almond milks  original, vanilla + chocolate  [] Ripple  unsweetened   [] Milkadamia  original +_ vanilla, unsweetened   [] Forager  original + vanilla, unsweetened   [] Silk Organic  soy milk, unsweetened  [] Oatly  unsweetened  [] Califia  regular + protein-fortified oat milk, unsweetened     CHEESES  [] Regular or reduced fat cheeses  [] BelGioso  Fresh Mozzarella Snack Packs, Parmesan Power-full Snack   [] Goat cheese  [] Fresh mozzarella  [] String cheese  all varieties  [] Sabi Cottage Cheese  [] Thea's Cultured Cottage Cheese  [] Ivette Life 'Just Like Mozzarella'  plant-based shreds and other varieties  [] Parmela Creamery  plant-based shredded cheese    YOGURT  [] Fage  2% low-fat, plain  [] Siggi's  plain, vanilla  [] Chobani Greek  nonfat + whole milk yogurt, plain   [] Chobani Less Sugar  all flavored varieties   [] Oikos Greek  nonfat, plain  [] Two Good  all varieties   [] [facility] Provisions  plain  [] [place] Organic  low-fat + nonfat, plain  [] [facility]  goat milk yogurt, plain  [] Kefit  unsweetened, plain  [] Forager  Greek style unsweetened, plain [dairy-free]  [] [place]  unsweetened Greek style, plain [dairy-free]  [] [facility]  almond milk yogurt, vanilla or plain, unsweetened [dairy-free]    FREEZER SECTION  FROZEN VEGGIES  [] All plain frozen veggies + greens [e.g. broccoli, brussels, carrots, okra, mushrooms, zucchini, yellow squash, butternut squash, kale, spinach, karen greens]  [] Riced veggies [e.g. cauliflower, broccoli, butternut squash]  [] Edamame  all  varieties  [] Green Giant  [] Veggie Spirals  [] Marinated Veggies [e.g. eggplant, peppers, zucchini]  [] Simply Steam [place] Sprouts  [] Birds Eye  [] Power Blend Italian Style  lentils, broccoli, kale, zucchini  []  [place] Sprouts & Carrots  [] Oven Roasters Broccoli & Cauliflower  [] [place] Blend  [] Tattooed   [] Green Bean Blend  [] Farmer's Market Ratatouille  [] Butter Balsamic Glazed Vegetables  [] Riced Cauliflower & Quinoa Mediterranean Style  [] Macy's Good Life  Southern Style Greens [sauteed kale + onion]    FROZEN FRUITS  [] All unsweetened frozen fruits  all varieties  [] Dole Fruits & Veggie Blends  Berries 'n Kale  [] Dole Mix-ins  Triple Berry     FROZEN ENTREES  [] The Good Kitchen meals  all varieties [ e.g. Chili Lime Chicken Over Riced Cauliflower]  [] Premium Paleo  Not Alejandro Momma's Meatloaf  [] Primal Kitchen  Chicken Pesto + Steak Fajitas w/ Peppers & Onions  [] Eating Well Frozen Entrees  Butter Chicken Masala, Steak Carne Asada, Creamy Pesto Chicken, Chicken + Wild Rice Stroganoff, Yellow Boucher Chicken, Sun-dried Tomato Chicken, Chicken Lo Mein  [] Realgood Entree Bowls  Hungarian Inspired Beef Bowl over Riced Cauliflower, Chicken Burrito Bowl   [] Great Karma Coconut Boucher  [] Jessica's  [place] with Black Beans, Vegetable Lasagna  [] Kashi Mayan [place] Bake  [] Healthy Choice  Simply Steamers Chicken Fried Rice  [] Basil Pesto Chicken & Czech Style Pork Power Bowls  [] Tattooed   Enchilada Bowl  [] Libby Farms  Spicy Black Bean Burgers    FROZEN PIZZAS  [] Cauli'flour Foods  Cauliflower Pizza Crusts  [] Outer Aisle  Cauliflower Crust  [] Jessica's  Veggie Crust Cheese Pizza  [] Quest Pizza     VEGETARIAN PRODUCTS  [] Beyond Meat  ground 'meat' + grilled 'chicken' strips  [] Tofurkey  Original Italian Sausage + Original Tempeh  [] Milad  Beefless Ground + Meatless Meatballs  [] [facility]  Original Yumiko Bonilla,  Meatballs    ICE CREAMS + FROZEN DESSERTS  [] Halo Top  regular + keto series, pops  [] Rebel  ice cream + dessert sandwiches  [] Enlightened  ice cream + bars  [] Nightfood  ice cream  [] Realgood  ice cream  [] Arctic Zero Fit  frozen pint  [] The Frozen Farmer  sorbets  [] Wholly Rollies  Protein Balls, all varieties  [] Dream Pops  Coconut Latte    FROZEN BREAKFASTS  [] Realgood  Breakfast Sandwiches on Cauliflower Cheesy Bread  [] Rebel  ice cream + dessert sandwiches  [] Enlightened  ice cream + bars  [] Nightfood  ice cream  [] Realgood  ice cream  [] Arctic Zero Fit  frozen pint  [] The Frozen Farmer  sorbets  [] Wholly Rollies  Protein Balls, all varieties  [] Dream Pops  Coconut Latte    BREADS/BUNS/WRAPS  [] Martin Bread: All Types - In Freezer Section   [] Flat Out Light Wraps - All Varieties   [] Flat Out Protein Up Carb Down Flat Bread   [] Kontos Whole Wheat Pocket Annie   [] Ye CATALINA 100% Whole Wheat Tortillas   [] LaTortilla Factory Tortillas - Smart & Delicious; 50 or 80-calorie   [] Nature's Own 100% Whole Wheat Bread   [] Orowheat Healthful - 100% Whole Wheat Slice Bread and [place] Thins   [] Orowheat Healthful - Whole Wheat Nuts & Grain Bread; Flax & Seed Bread   [] Pepperidge Farm Natural Whole Grain 15 Bread   [] Pepperidge Farm Natural Whole Grain English Muffin - 100% Whole Wheat   [] Pepperidge Farm Very Thin 100% Whole Wheat   [] Elena Reyes 45 Calories and Delightful   [] Abraham' 100% Whole Wheat Thin-Sliced Bagels and English Muffins   [] Western Bagel: Perfect 10     GLUTEN FREE  [] Sanjay's Gluten Free Bread   [] Hot Spring Bakehouse 7-Grain Gluten Free Bread     LEGUME PASTA   [] Explore Asian Organic Black Bean Spaghetti   [] Modern Table   [] Tolerant Foods       NUT BUTTERS & JELLIES    NUT BUTTERS   [] Better'n Chocolate: Coconut Chocolate Peanut Butter Spread   [] Better'n Peanut Butter - All Types   [] Earth Balance Coconut and Peanut Spread   [] Nish's Nut Hersey    [] MaraNatha: All Natural Roasted Cashew Butter - [place] or Creamy   [] MaraNatha: Roasted Peanut Butter   [] Nuts 'N More Peanut [place] - All Flavors   [] PB2 Powder - Original or Chocolate   [] Skippy Natural - Creamy, Super Chunk   [] Smart Balance Peanut Butter - [place] or Creamy   [] Peanut Butter & Company:   [] Smooth , Crunch Time, The Heat Is On, Old Fashioned Smooth, Mighty Nut- Powdered Peanut Butter, Squeeze Pack   [] Smucker's Natural Peanut Butter - [place] or Creamy   [] Sunbutter Nut Butter   [] Wild Friends Protein Peanut Butter/Gambell o Butter - Vanilla or Chocolate     JELLIES  o Polaner's All Fruit   o Clearly Organic Best Choice: Strawberry Fruit Spread       SNACKS    BARS  [] Kashi Bars - Chewy or Crunchy; Honey [place] o Flax or Peanut Butter   [] KIND Bars - 5 Grams of Sugar or Less   [] KIND Protein Bars - Strong and KIND   [] Counts include [facility] beds at the [facility] Valley Protein Bar - All Varieties   [] Nature Valley Roasted Nut Crunch - [place] Crunch; Peanut Crunch   [] [facility] Simple Nut Bar - Roasted Peanut & Honey   [] [facility] Simple Nut Bar - [place], Cashew & Sea Salt   [] [facility] Nut [place] Bar - Salted Caramel Peanut   [] Think Thin Protein Bars   [] Quest Bars, Power Crunch Bars, Pure Protein Bars     BEEF JERKY - NITRATE FREE   [] Game On   [] Grass Run Farms   [] Krave   [] Ostrim   [] Perky Jerky   [] Primal Strips Meatless Vegan Jerky   [] [place]     CHIPS   [] Beanitos Chips   [] Fruit Crisps - e.g. Brother's-All-Natural, Bare Fruit, Yoga Chips   [] Catherine's Soy Crisps: 1.3 ounce bag   [] Quest Protein Chips   [] Wasa Whole Wheat Crisp Bread     CRACKERS  [] Ally's Gone Crackers   [] Nabisco Triscuit: Regular and Thin Crisp Crackers   [] Vans Say Cheese Crackers (G-F)     POPCORN/NUTS   [] Mack Nick's Smart Pop Popcorn - Single Serving   [] 100-Calorie Pack of Nuts - All Varieties     PROTEIN POWDERS & DRINKS  []  Protein -  Whey Protein Powder   [] Garden  of Life Raw Protein Powder   [] Iconic Ready-To-Drink Protein Drink   [] Narvaez One Protein Powder   [] VegaSport Protein Powder     SALSA/HUMMUS/DIPS   [] Eat Well Embrace Life: Zesty Sriracha Carrot o Hummus   [] Pre-Portioned Guacamole Packs   [] Phan's   [] Tostitos Restaurant Style Salsa       SOUPS   [] Jessica's Organic Soups - Lentil, Vegetable, Split Pea, Low-Sodium     CANNED GOODS   [] 100% Pure Pumpkin   [] BlueRunner Creole Cream-Style Red Beans or Navy Beans   [] Cajun Power Chicken Gumbo Base   [] Chicken of the Sea [place] Lupton   [] Luis Eduardo Fresh Cut Sliced Beets   [] Hormel Breast of Chicken in Water   [] LeSuer Tender Baby Whole Carrots   [] Jerson Tabasco [place] Starter   [] Salvatoreist: Chunk Lite Tuna in Water, Gourmet Select Pouches   [] Salvatoreist: Yellowfin Tuna Fillets   [] Trappey's: Kidney, Butter, Cook, Black Eye, Field, and Black Beans   [] NIGEL Baldwin's Turnip Greens or Juan J Spinach     CONDIMENTS/ SAUCES/SPREADS/ SPICES  [] Jenaro Seals's Magic Seasonings - Regular or Salt Free   [] Margo Fall's Sauces - All Flavors   [] Laughing Cow Light - All Flavors   [] Dash Salt-Free Marinade - All Flavors   [] Chris & Guadalupe's: Heart Smart Pasta Sauces   [] Tabasco     SALAD DRESSINGS  [] Gina's Naturals: Lite Honey Mustard   [] Montana's Own: Lighten Up Salad Dressing - All Varieties   [] OPA Greek Yogurt Dressings - Ranch, Blue o Cheese, Caesar, Feta Dill     SWEETENERS  [] Sweet [place] Sweetener   [] Swerve   [] Truvia     BEVERAGES  [] Coconut Water   [] Crystal Light PURE - All Flavors   [] Honest Tea: Just Green Tea, Unsweetened   [] Kombucha Tea   [] [place]   [] [place] Sisters Bloody Ally Mix   [] Metromint - Zero-Sugar; All Natural Flavored   [] [place] - Plain or Flavored   [] Spindriff [place]   [] Steaz - Zero-Sugar, All-Natural, Sparkling Tea   [] Tea Bags: Any Brand - e.g. Juan, Yogi, Tazzo, Celestial   [] V8 100% Vegetable Juice   [] Vitamin Water Zero   [] Water   [] Zevia -  Stevia Sweetened Soft Drink     BEER/LYNDON/LIQUORS  []Scott's Premier Light 64 Calories   [] Bud Select - 55 Calories   [] [place] Sisters Bloody Ally Mix   [] Ojeda Genuine Draft - 64 Calories   [] Red or White Wine - All Varieties     CEREALS: HOT/COLD   [] Priscila's Puffin's Original Cereal  [] Vaishnavi's Mill Oat Bran Hot Cereal - Cracked Wheat, Multi-Grain  [] Kashi GoLean Cereal  [] Kashi GoLean Hot Cereal packets - Vanilla; Honey Cinnamon  [] Carlos's Special K Protein Cereal  [] Celia's Steel Cut Citizen of [place] Oatmeal  [] Nature's Path Smart Bran  [] Druze Instant Oatmeal packet, Original  [] Druze Old Fashioned Druze Oats  [] Uncle Jasper's Whole Wheat & Flaxseed Original Cereal

## 2023-12-14 ENCOUNTER — TELEPHONE (OUTPATIENT)
Dept: PREADMISSION TESTING | Facility: HOSPITAL | Age: 28
End: 2023-12-14
Payer: COMMERCIAL

## 2023-12-14 NOTE — TELEPHONE ENCOUNTER
Called and spoke with patient about the following:     Please arrive to [facility] ([facility]) at 10:00am on 12/15/23 for your scheduled procedure.  Address: [address] [place] [address] [place] [id number] ([address] Building on left, 1st Floor Lobby)  >>>NO eating or drinking after midnight unless instructed otherwise by your Surgeon<<<    Thank you,  -[facility] Pre Admit Testing Dept.  Mon-Fri 7:30 am - 4 pm [phone]

## 2023-12-15 ENCOUNTER — ANESTHESIA EVENT (OUTPATIENT)
Dept: SURGERY | Facility: HOSPITAL | Age: 28
End: 2023-12-15
Payer: COMMERCIAL

## 2023-12-15 ENCOUNTER — HOSPITAL ENCOUNTER (OUTPATIENT)
Facility: HOSPITAL | Age: 28
Discharge: HOME | End: 2023-12-15
Attending: PODIATRIST | Admitting: PODIATRIST
Payer: COMMERCIAL

## 2023-12-15 ENCOUNTER — PATIENT MESSAGE (OUTPATIENT)
Dept: SURGERY | Facility: HOSPITAL | Age: 28
End: 2023-12-15
Payer: COMMERCIAL

## 2023-12-15 ENCOUNTER — ANESTHESIA (OUTPATIENT)
Dept: SURGERY | Facility: HOSPITAL | Age: 28
End: 2023-12-15
Payer: COMMERCIAL

## 2023-12-15 DIAGNOSIS — S93.492S SPRAIN OF ANTERIOR TALOFIBULAR LIGAMENT OF LEFT ANKLE, SEQUELA: ICD-10-CM

## 2023-12-15 LAB
B-HCG UR QL: NEGATIVE
CTP QC/QA: YES

## 2023-12-15 PROCEDURE — 81025 URINE PREGNANCY TEST: CPT | Performed by: PODIATRIST

## 2023-12-15 PROCEDURE — 27698 PR REPAIR COLLAT ANKLE LIGMNT,SECONDARY: ICD-10-PCS | Mod: LT,,, | Performed by: PODIATRIST

## 2023-12-15 PROCEDURE — 27658 PR REPAIR FLEX LEG TENDON,PRIM,EA: ICD-10-PCS | Mod: 51,LT,, | Performed by: PODIATRIST

## 2023-12-15 PROCEDURE — 71000015 HC POSTOP RECOV 1ST HR: Performed by: PODIATRIST

## 2023-12-15 PROCEDURE — 25000003 PHARM REV CODE 250: Performed by: NURSE ANESTHETIST, CERTIFIED REGISTERED

## 2023-12-15 PROCEDURE — 71000033 HC RECOVERY, INTIAL HOUR: Performed by: PODIATRIST

## 2023-12-15 PROCEDURE — 63600175 PHARM REV CODE 636 W HCPCS: Performed by: NURSE ANESTHETIST, CERTIFIED REGISTERED

## 2023-12-15 PROCEDURE — 27698 REPAIR OF ANKLE LIGAMENT: CPT | Mod: LT,,, | Performed by: PODIATRIST

## 2023-12-15 PROCEDURE — C1713 ANCHOR/SCREW BN/BN,TIS/BN: HCPCS | Performed by: PODIATRIST

## 2023-12-15 PROCEDURE — 71000039 HC RECOVERY, EACH ADD'L HOUR: Performed by: PODIATRIST

## 2023-12-15 PROCEDURE — 27658 REPAIR OF LEG TENDON EACH: CPT | Mod: 51,LT,, | Performed by: PODIATRIST

## 2023-12-15 PROCEDURE — 36000707: Performed by: PODIATRIST

## 2023-12-15 PROCEDURE — 36000706: Performed by: PODIATRIST

## 2023-12-15 PROCEDURE — 63600175 PHARM REV CODE 636 W HCPCS: Performed by: PODIATRIST

## 2023-12-15 PROCEDURE — 37000008 HC ANESTHESIA 1ST 15 MINUTES: Performed by: PODIATRIST

## 2023-12-15 PROCEDURE — 63600175 PHARM REV CODE 636 W HCPCS: Performed by: ANESTHESIOLOGY

## 2023-12-15 PROCEDURE — 63600175 PHARM REV CODE 636 W HCPCS: Performed by: FAMILY MEDICINE

## 2023-12-15 PROCEDURE — 37000009 HC ANESTHESIA EA ADD 15 MINS: Performed by: PODIATRIST

## 2023-12-15 DEVICE — IB KIT, BC, W/ CC FT AND JUMPSTART
Type: IMPLANTABLE DEVICE | Site: ANKLE | Status: FUNCTIONAL
Brand: ARTHREX®

## 2023-12-15 RX ORDER — MIDAZOLAM HYDROCHLORIDE 1 MG/ML
INJECTION, SOLUTION INTRAMUSCULAR; INTRAVENOUS
Status: DISCONTINUED | OUTPATIENT
Start: 2023-12-15 | End: 2023-12-15

## 2023-12-15 RX ORDER — ONDANSETRON 2 MG/ML
INJECTION INTRAMUSCULAR; INTRAVENOUS
Status: DISCONTINUED | OUTPATIENT
Start: 2023-12-15 | End: 2023-12-15

## 2023-12-15 RX ORDER — OXYCODONE HYDROCHLORIDE 10 MG/1
10 TABLET ORAL EVERY 4 HOURS PRN
Qty: 42 TABLET | Refills: 0 | Status: SHIPPED | OUTPATIENT
Start: 2023-12-15 | End: 2023-12-22

## 2023-12-15 RX ORDER — MELOXICAM 15 MG/1
15 TABLET ORAL DAILY
Qty: 30 TABLET | Refills: 0 | Status: SHIPPED | OUTPATIENT
Start: 2023-12-15 | End: 2024-01-14

## 2023-12-15 RX ORDER — ONDANSETRON 2 MG/ML
4 INJECTION INTRAMUSCULAR; INTRAVENOUS DAILY PRN
Status: DISCONTINUED | OUTPATIENT
Start: 2023-12-15 | End: 2023-12-15 | Stop reason: HOSPADM

## 2023-12-15 RX ORDER — OXYCODONE HYDROCHLORIDE 5 MG/1
5 TABLET ORAL
Status: DISCONTINUED | OUTPATIENT
Start: 2023-12-15 | End: 2023-12-15 | Stop reason: HOSPADM

## 2023-12-15 RX ORDER — KETOROLAC TROMETHAMINE 30 MG/ML
15 INJECTION, SOLUTION INTRAMUSCULAR; INTRAVENOUS EVERY 8 HOURS PRN
Status: DISCONTINUED | OUTPATIENT
Start: 2023-12-15 | End: 2023-12-15 | Stop reason: HOSPADM

## 2023-12-15 RX ORDER — GABAPENTIN 100 MG/1
100 CAPSULE ORAL 2 TIMES DAILY
Qty: 60 CAPSULE | Refills: 0 | Status: SHIPPED | OUTPATIENT
Start: 2023-12-15 | End: 2024-01-02

## 2023-12-15 RX ORDER — PROPOFOL 10 MG/ML
VIAL (ML) INTRAVENOUS
Status: DISCONTINUED | OUTPATIENT
Start: 2023-12-15 | End: 2023-12-15

## 2023-12-15 RX ORDER — LIDOCAINE HYDROCHLORIDE 20 MG/ML
INJECTION INTRAVENOUS
Status: DISCONTINUED | OUTPATIENT
Start: 2023-12-15 | End: 2023-12-15

## 2023-12-15 RX ORDER — CEFADROXIL 500 MG/1
500 CAPSULE ORAL EVERY 12 HOURS
Qty: 10 CAPSULE | Refills: 0 | Status: SHIPPED | OUTPATIENT
Start: 2023-12-15 | End: 2023-12-20

## 2023-12-15 RX ORDER — METHOCARBAMOL 750 MG/1
1500 TABLET, FILM COATED ORAL 3 TIMES DAILY
Qty: 90 TABLET | Refills: 0 | Status: SHIPPED | OUTPATIENT
Start: 2023-12-15 | End: 2023-12-30

## 2023-12-15 RX ORDER — DEXAMETHASONE SODIUM PHOSPHATE 4 MG/ML
INJECTION, SOLUTION INTRA-ARTICULAR; INTRALESIONAL; INTRAMUSCULAR; INTRAVENOUS; SOFT TISSUE
Status: DISCONTINUED | OUTPATIENT
Start: 2023-12-15 | End: 2023-12-15

## 2023-12-15 RX ORDER — HYDROMORPHONE HYDROCHLORIDE 2 MG/ML
0.2 INJECTION, SOLUTION INTRAMUSCULAR; INTRAVENOUS; SUBCUTANEOUS EVERY 5 MIN PRN
Status: COMPLETED | OUTPATIENT
Start: 2023-12-15 | End: 2023-12-15

## 2023-12-15 RX ORDER — SODIUM CHLORIDE 0.9 % (FLUSH) 0.9 %
3 SYRINGE (ML) INJECTION
Status: DISCONTINUED | OUTPATIENT
Start: 2023-12-15 | End: 2023-12-15 | Stop reason: HOSPADM

## 2023-12-15 RX ORDER — FENTANYL CITRATE 50 UG/ML
INJECTION, SOLUTION INTRAMUSCULAR; INTRAVENOUS
Status: DISCONTINUED | OUTPATIENT
Start: 2023-12-15 | End: 2023-12-15

## 2023-12-15 RX ORDER — CEFAZOLIN SODIUM 1 G/3ML
INJECTION, POWDER, FOR SOLUTION INTRAMUSCULAR; INTRAVENOUS
Status: DISCONTINUED | OUTPATIENT
Start: 2023-12-15 | End: 2023-12-15

## 2023-12-15 RX ORDER — PROCHLORPERAZINE EDISYLATE 5 MG/ML
5 INJECTION INTRAMUSCULAR; INTRAVENOUS EVERY 30 MIN PRN
Status: DISCONTINUED | OUTPATIENT
Start: 2023-12-15 | End: 2023-12-15 | Stop reason: HOSPADM

## 2023-12-15 RX ORDER — BUPIVACAINE HYDROCHLORIDE 5 MG/ML
INJECTION, SOLUTION EPIDURAL; INTRACAUDAL
Status: DISCONTINUED | OUTPATIENT
Start: 2023-12-15 | End: 2023-12-15 | Stop reason: HOSPADM

## 2023-12-15 RX ORDER — DIPHENHYDRAMINE HYDROCHLORIDE 50 MG/ML
25 INJECTION INTRAMUSCULAR; INTRAVENOUS EVERY 6 HOURS PRN
Status: DISCONTINUED | OUTPATIENT
Start: 2023-12-15 | End: 2023-12-15 | Stop reason: HOSPADM

## 2023-12-15 RX ORDER — ALBUTEROL SULFATE 0.83 MG/ML
2.5 SOLUTION RESPIRATORY (INHALATION) EVERY 4 HOURS PRN
Status: DISCONTINUED | OUTPATIENT
Start: 2023-12-15 | End: 2023-12-15 | Stop reason: HOSPADM

## 2023-12-15 RX ORDER — MEPERIDINE HYDROCHLORIDE 25 MG/ML
12.5 INJECTION INTRAMUSCULAR; INTRAVENOUS; SUBCUTANEOUS ONCE
Status: DISCONTINUED | OUTPATIENT
Start: 2023-12-15 | End: 2023-12-15 | Stop reason: HOSPADM

## 2023-12-15 RX ORDER — CEFAZOLIN SODIUM 2 G/50ML
2 SOLUTION INTRAVENOUS
Status: ACTIVE | OUTPATIENT
Start: 2023-12-15 | End: 2023-12-15

## 2023-12-15 RX ADMIN — HYDROMORPHONE HYDROCHLORIDE 0.2 MG: 2 INJECTION INTRAMUSCULAR; INTRAVENOUS; SUBCUTANEOUS at 01:12

## 2023-12-15 RX ADMIN — LIDOCAINE HYDROCHLORIDE 100 MG: 20 INJECTION INTRAVENOUS at 10:12

## 2023-12-15 RX ADMIN — HYDROMORPHONE HYDROCHLORIDE 0.2 MG: 2 INJECTION INTRAMUSCULAR; INTRAVENOUS; SUBCUTANEOUS at 12:12

## 2023-12-15 RX ADMIN — FENTANYL CITRATE 50 MCG: 50 INJECTION, SOLUTION INTRAMUSCULAR; INTRAVENOUS at 10:12

## 2023-12-15 RX ADMIN — ONDANSETRON 4 MG: 2 INJECTION INTRAMUSCULAR; INTRAVENOUS at 11:12

## 2023-12-15 RX ADMIN — MIDAZOLAM 2 MG: 1 INJECTION INTRAMUSCULAR; INTRAVENOUS at 10:12

## 2023-12-15 RX ADMIN — FENTANYL CITRATE 50 MCG: 50 INJECTION, SOLUTION INTRAMUSCULAR; INTRAVENOUS at 11:12

## 2023-12-15 RX ADMIN — CEFAZOLIN 2 G: 330 INJECTION, POWDER, FOR SOLUTION INTRAMUSCULAR; INTRAVENOUS at 10:12

## 2023-12-15 RX ADMIN — DEXAMETHASONE SODIUM PHOSPHATE 4 MG: 4 INJECTION, SOLUTION INTRA-ARTICULAR; INTRALESIONAL; INTRAMUSCULAR; INTRAVENOUS; SOFT TISSUE at 11:12

## 2023-12-15 RX ADMIN — PROPOFOL 200 MG: 10 INJECTION, EMULSION INTRAVENOUS at 10:12

## 2023-12-15 NOTE — ANESTHESIA POSTPROCEDURE EVALUATION
Anesthesia Post Evaluation    Patient: Rafaela Zuniga    Procedure(s) Performed: Procedure(s) (LRB):  REPAIR, LIGAMENT, ANKLE (Left)  REPAIR, TENDON, FLEXOR, LOWER EXTREMITY, SECONDARY, USING GRAFT IF INDICATED (Left)    Final Anesthesia Type: general      Patient location during evaluation: PACU  Patient participation: Yes- Able to Participate  Level of consciousness: awake  Post-procedure vital signs: reviewed and stable  Pain management: adequate  Airway patency: patent    PONV status at discharge: No PONV  Anesthetic complications: no      Cardiovascular status: hemodynamically stable  Respiratory status: unassisted  Hydration status: euvolemic  Follow-up not needed.              Vitals Value Taken Time   /66 12/15/23 1316   Temp 36.2 °C (97.1 °F) 12/15/23 1225   Pulse 78 12/15/23 1325   Resp 12 12/15/23 1324   SpO2 98 % 12/15/23 1325   Vitals shown include unvalidated device data.      Event Time   Out of Recovery 13:28:16         Pain/Kee Score: Pain Rating Prior to Med Admin: 6 (12/15/2023  1:25 PM)  Kee Score: 10 (12/15/2023  1:15 PM)

## 2023-12-15 NOTE — BRIEF OP NOTE
[facility] ([facility])  Brief Operative Note    Surgery Date: 12/15/2023     Surgeon(s) and Role:     * Brandon Benjamin DPM - Primary    Assisting Surgeon: None    Pre-op Diagnosis:  Sprain of anterior talofibular ligament of left ankle, subsequent encounter [S93.492D]  Sprain of calcaneofibular ligament of left ankle, subsequent encounter [S93.412D]  Pain and swelling of left ankle [M25.572, M25.472]    Post-op Diagnosis:  Post-Op Diagnosis Codes:     * Sprain of anterior talofibular ligament of left ankle, subsequent encounter [S93.492D]     * Sprain of calcaneofibular ligament of left ankle, subsequent encounter [S93.412D]     * Pain and swelling of left ankle [M25.572, M25.472]    Procedure(s) (LRB):  REPAIR, LIGAMENT, ANKLE (Left)  REPAIR, TENDON, FLEXOR, LOWER EXTREMITY, SECONDARY, USING GRAFT IF INDICATED (Left)    Anesthesia: General    Operative Findings: As per Dx.     Estimated Blood Loss: * No values recorded between 12/15/2023 10:58 AM and 12/15/2023 12:19 PM *         Specimens:   Specimen (24h ago, onward)      None              Discharge Note    OUTCOME: Patient tolerated treatment/procedure well without complication and is now ready for discharge.    DISPOSITION: Home or Self Care    FINAL DIAGNOSIS:    * Sprain of anterior talofibular ligament of left ankle, subsequent encounter [S93.492D]     * Sprain of calcaneofibular ligament of left ankle, subsequent encounter [S93.412D]     * Pain and swelling of left ankle [M25.572, M25.472]     * Peroneal tendon tear, LLE    FOLLOWUP: In clinic    DISCHARGE INSTRUCTIONS:   Weightbearing Status and Wound Care:  1. When walking, wear the surgical shoe or walking boot at all times.    2. During daytime/awake hours, if a CAST or POSTERIOR SPLINT is in place, ice the posterior aspect of the leg, behind the knee, 20 minutes on (w/ ice) and followed by 20 minutes off (w/o ice) until follow up; repeat accordingly until follow up. IF no CAST or POSTERIOR  SPLINT is in place, ice the anterior aspect of the ankle, in a similar manner. During night time/sleep hours, simply elevating the limb above the level of the heart is sufficient.     3. Elevate the extremity above the level of the heart at all times when sitting.    4. Take the pain mediations as prescribed for the initial 3 or so days, then only as needed.    5. If no overt medical contra-indication, take Motrin or Advil or Ibuprofen or Aleve in between the pain medication doses.    6. Do not change the dressings.    7. Do not get the dressings wet.     8. Please be reminded of the harmful effects of nicotine/tobacco/cigarette/cigar/marijuana smoking, especially in relation to the lower extremity, particularly in reference to post operative healing. I do rec. complete or near complete cessation of any or all of the aforementioned until you are well out of the post-operative period.    9. If you were prescribed more than one short acting opioid, e.g., (Morphine or Dilaudid), with Percocet or Norco or Tramadol, the Morphine (MSIR) or Dilaudid (Hydromorphone) is to be taken during the immediate initial days s/p, at an interval of every 6 hours or 5 hours or 4 hours, as needed for pain control. Once you are complete with the Morphine (MSIR) or Dilaudid (Hydromorphone), you may/should convert to the secondary medication. DO NOT take both medications together at any time. It is not advisable to start with the less potent medication, Percocet or Norco or Tramadol, and then convert to the stronger medication.     10. Stool Softeners to avoid constipation should start today/tomorrow AM.    11. Laxatives or Enemas as necessary.

## 2023-12-15 NOTE — DISCHARGE SUMMARY
[facility] ([facility]ospital)  Discharge Note  Short Stay    Procedure(s) (LRB):  REPAIR, LIGAMENT, ANKLE (Left)  REPAIR, TENDON, FLEXOR, LOWER EXTREMITY, SECONDARY, USING GRAFT IF INDICATED (Left)      OUTCOME: Patient tolerated treatment/procedure well without complication and is now ready for discharge.    DISPOSITION: Home or Self Care    FINAL DIAGNOSIS:     * Sprain of anterior talofibular ligament of left ankle, subsequent encounter [S93.492D]     * Sprain of calcaneofibular ligament of left ankle, subsequent encounter [S93.412D]     * Pain and swelling of left ankle [M25.572, M25.472]     * Peroneal tendon tear, LLE     FOLLOWUP: In clinic    DISCHARGE INSTRUCTIONS:    Weightbearing Status and Wound Care:  1. When walking, wear the surgical shoe or walking boot at all times.    2. During daytime/awake hours, if a CAST or POSTERIOR SPLINT is in place, ice the posterior aspect of the leg, behind the knee, 20 minutes on (w/ ice) and followed by 20 minutes off (w/o ice) until follow up; repeat accordingly until follow up. IF no CAST or POSTERIOR SPLINT is in place, ice the anterior aspect of the ankle, in a similar manner. During night time/sleep hours, simply elevating the limb above the level of the heart is sufficient.     3. Elevate the extremity above the level of the heart at all times when sitting.    4. Take the pain mediations as prescribed for the initial 3 or so days, then only as needed.    5. If no overt medical contra-indication, take Motrin or Advil or Ibuprofen or Aleve in between the pain medication doses.    6. Do not change the dressings.    7. Do not get the dressings wet.     8. Please be reminded of the harmful effects of nicotine/tobacco/cigarette/cigar/marijuana smoking, especially in relation to the lower extremity, particularly in reference to post operative healing. I do rec. complete or near complete cessation of any or all of the aforementioned until you are well out of the  post-operative period.    9. If you were prescribed more than one short acting opioid, e.g., (Morphine or Dilaudid), with Percocet or Norco or Tramadol, the Morphine (MSIR) or Dilaudid (Hydromorphone) is to be taken during the immediate initial days s/p, at an interval of every 6 hours or 5 hours or 4 hours, as needed for pain control. Once you are complete with the Morphine (MSIR) or Dilaudid (Hydromorphone), you may/should convert to the secondary medication. DO NOT take both medications together at any time. It is not advisable to start with the less potent medication, Percocet or Norco or Tramadol, and then convert to the stronger medication.     10. Stool Softeners to avoid constipation should start today/tomorrow AM.    11. Laxatives or Enemas as necessary.           TIME SPENT ON DISCHARGE: 10 minutes

## 2023-12-15 NOTE — ANESTHESIA PREPROCEDURE EVALUATION
12/15/2023  Rafaela Zuniga is a 27 y.o., female.    Patient Active Problem List   Diagnosis    Bipolar disorder, in full remission, most recent episode mixed    Idiopathic intracranial hypertension    Presence of ventricular shunt    IUD (intrauterine device) in place    BMI 36.0-36.9,adult    ISAAC (generalized anxiety disorder)    Classical migraine with intractable migraine    Chronic mixed headache syndrome    Left ankle sprain    Insomnia    ADHD      Pre-op Assessment    I have reviewed the Patient Summary Reports.     I have reviewed the Nursing Notes. I have reviewed the NPO Status.   I have reviewed the Medications.     Review of Systems  Anesthesia Hx:             Denies Family Hx of Anesthesia complications.    Denies Personal Hx of Anesthesia complications.                    Hematology/Oncology:  Hematology Normal   Oncology Normal                                   EENT/Dental:  EENT/Dental Normal           Cardiovascular:  Cardiovascular Normal                  ECG has been reviewed.                          Pulmonary:  Pulmonary Normal                       Renal/:  Renal/ Normal                 Hepatic/GI:  Hepatic/GI Normal                 Musculoskeletal:  Musculoskeletal Normal                Neurological:      Headaches                                 Endocrine:  Endocrine Normal          Obesity / BMI > 30  Dermatological:  Skin Normal    Psych:  Psychiatric History                  Physical Exam  General: Alert    Airway:  Mallampati: II   Mouth Opening: Normal  TM Distance: Normal  Tongue: Normal  Neck ROM: Normal ROM        Anesthesia Plan  Type of Anesthesia, risks & benefits discussed:    Anesthesia Type: Gen ETT, Gen Supraglottic Airway  Intra-op Monitoring Plan: Standard ASA Monitors  Induction:  IV  Informed Consent: Informed consent signed with the Patient and all parties  understand the risks and agree with anesthesia plan.  All questions answered.   ASA Score: 2  Day of Surgery Review of History & Physical: I have interviewed and examined the patient. I have reviewed the patient's H&P dated:     Ready For Surgery From Anesthesia Perspective.     .

## 2023-12-15 NOTE — BRIEF OP NOTE
[facility] ([facility])  Brief Operative Note    Surgery Date: 12/15/2023     Surgeon(s) and Role:     * Brandon Benjamin DPM - Primary    Assisting Surgeon: None    Pre-op Diagnosis:  Sprain of anterior talofibular ligament of left ankle, subsequent encounter [S93.492D]  Sprain of calcaneofibular ligament of left ankle, subsequent encounter [S93.412D]  Pain and swelling of left ankle [M25.572, M25.472]    Post-op Diagnosis:  Post-Op Diagnosis Codes:     * Sprain of anterior talofibular ligament of left ankle, subsequent encounter [S93.492D]     * Sprain of calcaneofibular ligament of left ankle, subsequent encounter [S93.412D]     * Pain and swelling of left ankle [M25.572, M25.472]     * Peroneal tendon tear, LLE     Procedure(s) (LRB):  REPAIR, LIGAMENT, ANKLE (Left)  REPAIR, TENDON, FLEXOR, LOWER EXTREMITY, SECONDARY, USING GRAFT IF INDICATED (Left)    Anesthesia: General    Operative Findings: As per Dx.     Estimated Blood Loss: * No values recorded between 12/15/2023 10:58 AM and 12/15/2023 12:19 PM *         Specimens:   Specimen (24h ago, onward)      None              Discharge Note    OUTCOME: Patient tolerated treatment/procedure well without complication and is now ready for discharge.    DISPOSITION: Home or Self Care    FINAL DIAGNOSIS:      * Sprain of anterior talofibular ligament of left ankle, subsequent encounter [S93.492D]     * Sprain of calcaneofibular ligament of left ankle, subsequent encounter [S93.412D]     * Pain and swelling of left ankle [M25.572, M25.472]     * Peroneal tendon tear, LLE     FOLLOWUP: In clinic    DISCHARGE INSTRUCTIONS:   Weightbearing Status and Wound Care:  1. When walking, wear the surgical shoe or walking boot at all times.    2. During daytime/awake hours, if a CAST or POSTERIOR SPLINT is in place, ice the posterior aspect of the leg, behind the knee, 20 minutes on (w/ ice) and followed by 20 minutes off (w/o ice) until follow up; repeat accordingly until  follow up. IF no CAST or POSTERIOR SPLINT is in place, ice the anterior aspect of the ankle, in a similar manner. During night time/sleep hours, simply elevating the limb above the level of the heart is sufficient.     3. Elevate the extremity above the level of the heart at all times when sitting.    4. Take the pain mediations as prescribed for the initial 3 or so days, then only as needed.    5. If no overt medical contra-indication, take Motrin or Advil or Ibuprofen or Aleve in between the pain medication doses.    6. Do not change the dressings.    7. Do not get the dressings wet.     8. Please be reminded of the harmful effects of nicotine/tobacco/cigarette/cigar/marijuana smoking, especially in relation to the lower extremity, particularly in reference to post operative healing. I do rec. complete or near complete cessation of any or all of the aforementioned until you are well out of the post-operative period.    9. If you were prescribed more than one short acting opioid, e.g., (Morphine or Dilaudid), with Percocet or Norco or Tramadol, the Morphine (MSIR) or Dilaudid (Hydromorphone) is to be taken during the immediate initial days s/p, at an interval of every 6 hours or 5 hours or 4 hours, as needed for pain control. Once you are complete with the Morphine (MSIR) or Dilaudid (Hydromorphone), you may/should convert to the secondary medication. DO NOT take both medications together at any time. It is not advisable to start with the less potent medication, Percocet or Norco or Tramadol, and then convert to the stronger medication.     10. Stool Softeners to avoid constipation should start today/tomorrow AM.    11. Laxatives or Enemas as necessary.

## 2023-12-15 NOTE — OP NOTE
[facility]  Podiatric Medicine & Surgery  Operative Report    SUMMARY     Date of Procedure: 12/15/2023    Procedure: Procedure(s):  REPAIR, LIGAMENT, ANKLE  REPAIR, TENDON, FLEXOR, LOWER EXTREMITY, SECONDARY, USING GRAFT IF INDICATED    Surgeon(s) and Role: Surgeon(s) and Role:     * Brandon Benjamin, DPM - Primary    Pre-Operative Diagnosis: Pre-Op Diagnosis Codes:     * Sprain of anterior talofibular ligament of left ankle, subsequent encounter [S93.492D]     * Sprain of calcaneofibular ligament of left ankle, subsequent encounter [S93.412D]     * Pain and swelling of left ankle [M25.572, M25.472]     * Peroneal tendon tear, LLE    Post-Operative Diagnosis: Post-Op Diagnosis Codes:     * Sprain of anterior talofibular ligament of left ankle, subsequent encounter [S93.492D]     * Sprain of calcaneofibular ligament of left ankle, subsequent encounter [S93.412D]     * Pain and swelling of left ankle [M25.572, M25.472]     * Peroneal tendon tear, LLE    Anesthesia: General    Technical Procedures Used:   1. ATFL repair, LLE.   2. Repair of peroneal tendon, LLE.     Description of the Findings of the Procedure: The patient was seen in the Holding Room. The risks, benefits, complications, treatment options, and expected outcomes were discussed with the patient. The risks and potential complications of their problem and purposed treatment include but are not limited to infection, nerve injury, vascular injury, nonunion/malunion/delayed union of the surgical site, persistent pain, potential skin necrosis, deep vein thrombosis, possible pulmonary embolus, complications of the anesthetics and failure of the implant.  The patient concurred with the proposed plan, giving informed consent. The patient is aware that the procedure may be a part of a staged collection of procedures for definitive cure and/or alleviation of symptoms. The site of surgery properly noted/marked. Preoperative intravenous  antibiotics are hanging at bedside, and are currently being administered via the heparin lock. The patient was taken to Operating Suite.    Once in the operative suite, the patient is transferred onto the operative table in the supine position. A TIME-OUT is taken as per protocol to identify the proper patient, procedure to be performed, and laterality.  The patient is properly positioned on the operating room table for ease of dissection and for any ancillary imaging.  A well-padded tourniquet was applied to the left thigh.  Nursing and ancillary OR staff prepared the patient for the procedure. The patient is adequately sedated by the Attending Anesthesiologist and/or covering CRNA.   Next, the operative limb was rendered sterile using chlorhexidine paint and scrub.  Sterile sheets and drapes were applied thereafter. Another TIME-OUT is taken as per protocol to identify the proper patient, procedure to be performed, and laterality.      The ankle is stressed under fluoroscopy and is noted to have a positive anterior drawer sign as well as stress varus and valgus pathology. The tourniquet is elevated to 350mmHg after the limb is exsanguinated for approximately 2 min with an Esmarch bandage.  Following this, sharp skin incision, about the fibula for access to the ATFL, CFL and PB/PL tendons.  Full-thickness skin flaps are raised.  Electrocautery as necessitated.  Copious irrigation with sterile saline solution.  Deep dissection with sharp curved Metzenbaum scissors.  Of note, there is copious scar tissues and fibrosis due to the chronicity of this injury.  The intermediate dorsal cutaneous nerve was not encountered or visualized at the proximal aspect of the incision and the sural nerve was not visualized at the plantar aspect of the incision.  The peroneal tendons are noted to be coursing at the plantar aspect of the incision.  The peroneal tendon sheath is entered with a large curved Metzenbaum scissor.  Once the  peroneal tendon sheath is entered, the peroneal tendons were inspected and the PB tendon is torn.    Following standard technique, the talar portion of the Arthrex Lateral Ankle Internal Brace, an anchor, is inserted at the lateral talus with the assistance of fluoroscopy (being mindful not to violate the STJ or dorsal talus). Following this, implantation of the fibula anchor in similar fashion with the assistance of xray. This anchor does have the fiber-tape from the talar anchor. The fibula anchor is tensioned appropriately with a bump placed on the lower left for posterior translation of the talus and the foot is held in slight eversion. Copious irrigation with sterile saline solution.    The ankle is once again stressed under fluoroscopy and no further pathology is noted. The extensor retinaculum was repaired to the ATFL area with Vicryl suture. Further deep closure with Vicryl suture.     Following this, the peroneal tendon tear is debrided and the tendon is tubularized with #2 Ethibond suture. The retinaculum is repaired.  Deep closure using Vicryl suture.  Skin is closed using nylon.    Postoperative block is given and the cuff is deflated and capillary refill time is normal. All incisions are dressed with Xeroform/Adaptic nonadherent dressings followed by sterile 4 x 4 gauze, abdominal pad, Crista/Kerlix and light ACE. Beth Compression is applied.    The anesthesia is weaned. There were no complications to this procedure. Any final necessary imaging to be performed in the PACU if it was not performed here in the OR suite.  The patient is transferred to the [facility] bed/stretcher, \[facility]\"". The patient is transferred to the PACU.    Significant Surgical Tasks Conducted by the Assistant(s), if Applicable: N/A    Complications: * No complications entered in OR log *    Estimated Blood Loss (EBL): Minimal    Drains: N/A    Implants:   Implant Name Type Inv. Item Serial No.  Lot No. LRB No. Used  Action   KIT INTERNALBRACE  JUMPSTART D - SN/A  KIT INTERNALBRACE  JUMPSTART D N/A [employer] [id number] Left 1 Implanted       Specimens: * No specimens in log *    Condition: stable    Disposition: PACU - hemodynamically stable.    Attestation: I performed the procedure.

## 2023-12-15 NOTE — TRANSFER OF CARE
"Anesthesia Transfer of Care Note    Patient: Rafaela Zuniga    Procedure(s) Performed: Procedure(s) (LRB):  REPAIR, LIGAMENT, ANKLE (Left)  REPAIR, TENDON, FLEXOR, LOWER EXTREMITY, SECONDARY, USING GRAFT IF INDICATED (Left)    Patient location: PACU    Anesthesia Type: general    Transport from OR: Transported from OR on room air with adequate spontaneous ventilation    Post pain: adequate analgesia    Post assessment: no apparent anesthetic complications    Post vital signs: stable    Level of consciousness: awake and responds to stimulation    Nausea/Vomiting: no nausea/vomiting    Complications: none    Transfer of care protocol was followed      Last vitals: Visit Vitals  BP (!) 124/55   Pulse 62   Temp 36.8 °C (98.2 °F) (Temporal)   Resp 16   Ht 5' 7" (1.702 m)   Wt 112.7 kg (248 lb 7.3 oz)   SpO2 100%   Breastfeeding No   BMI 38.91 kg/m²     "

## 2023-12-19 ENCOUNTER — TELEPHONE (OUTPATIENT)
Dept: NEUROSURGERY | Facility: CLINIC | Age: 28
End: 2023-12-19
Payer: COMMERCIAL

## 2023-12-19 VITALS
TEMPERATURE: 97 F | HEIGHT: 67 IN | RESPIRATION RATE: 18 BRPM | DIASTOLIC BLOOD PRESSURE: 66 MMHG | SYSTOLIC BLOOD PRESSURE: 119 MMHG | BODY MASS INDEX: 38.99 KG/M2 | HEART RATE: 69 BPM | OXYGEN SATURATION: 96 % | WEIGHT: 248.44 LBS

## 2023-12-19 DIAGNOSIS — Z98.2 PRESENCE OF VENTRICULAR SHUNT: Primary | ICD-10-CM

## 2023-12-20 ENCOUNTER — OFFICE VISIT (OUTPATIENT)
Dept: NEUROSURGERY | Facility: CLINIC | Age: 28
End: 2023-12-20
Payer: COMMERCIAL

## 2023-12-20 ENCOUNTER — HOSPITAL ENCOUNTER (OUTPATIENT)
Dept: RADIOLOGY | Facility: HOSPITAL | Age: 28
Discharge: HOME | End: 2023-12-20
Attending: PHYSICIAN ASSISTANT
Payer: COMMERCIAL

## 2023-12-20 ENCOUNTER — PATIENT MESSAGE (OUTPATIENT)
Dept: PODIATRY | Facility: CLINIC | Age: 28
End: 2023-12-20
Payer: COMMERCIAL

## 2023-12-20 VITALS
HEIGHT: 67 IN | SYSTOLIC BLOOD PRESSURE: 139 MMHG | HEART RATE: 99 BPM | BODY MASS INDEX: 38.45 KG/M2 | WEIGHT: 245 LBS | DIASTOLIC BLOOD PRESSURE: 84 MMHG

## 2023-12-20 DIAGNOSIS — Z98.2 PRESENCE OF VENTRICULAR SHUNT: ICD-10-CM

## 2023-12-20 DIAGNOSIS — G93.2 IDIOPATHIC INTRACRANIAL HYPERTENSION: Primary | ICD-10-CM

## 2023-12-20 DIAGNOSIS — M25.572 PAIN AND SWELLING OF LEFT ANKLE: Primary | ICD-10-CM

## 2023-12-20 DIAGNOSIS — Z98.890 HISTORY OF ANKLE SURGERY: ICD-10-CM

## 2023-12-20 DIAGNOSIS — M25.472 PAIN AND SWELLING OF LEFT ANKLE: Primary | ICD-10-CM

## 2023-12-20 PROCEDURE — 99999 PR PBB SHADOW E&M-EST. PATIENT-LVL III: CPT | Mod: PBBFAC,,, | Performed by: PHYSICIAN ASSISTANT

## 2023-12-20 PROCEDURE — 70450 CT HEAD WITHOUT CONTRAST: ICD-10-PCS | Mod: 26,,, | Performed by: RADIOLOGY

## 2023-12-20 PROCEDURE — 1159F PR MEDICATION LIST DOCUMENTED IN MEDICAL RECORD: ICD-10-PCS | Mod: CPTII,S$GLB,, | Performed by: PHYSICIAN ASSISTANT

## 2023-12-20 PROCEDURE — 3075F SYST BP GE 130 - 139MM HG: CPT | Mod: CPTII,S$GLB,, | Performed by: PHYSICIAN ASSISTANT

## 2023-12-20 PROCEDURE — 3008F BODY MASS INDEX DOCD: CPT | Mod: CPTII,S$GLB,, | Performed by: PHYSICIAN ASSISTANT

## 2023-12-20 PROCEDURE — 70450 CT HEAD/BRAIN W/O DYE: CPT | Mod: TC

## 2023-12-20 PROCEDURE — 99214 PR OFFICE/OUTPT VISIT, EST, LEVL IV, 30-39 MIN: ICD-10-PCS | Mod: S$GLB,,, | Performed by: PHYSICIAN ASSISTANT

## 2023-12-20 PROCEDURE — 99999 PR PBB SHADOW E&M-EST. PATIENT-LVL III: ICD-10-PCS | Mod: PBBFAC,,, | Performed by: PHYSICIAN ASSISTANT

## 2023-12-20 PROCEDURE — 3079F PR MOST RECENT DIASTOLIC BLOOD PRESSURE 80-89 MM HG: ICD-10-PCS | Mod: CPTII,S$GLB,, | Performed by: PHYSICIAN ASSISTANT

## 2023-12-20 PROCEDURE — 3079F DIAST BP 80-89 MM HG: CPT | Mod: CPTII,S$GLB,, | Performed by: PHYSICIAN ASSISTANT

## 2023-12-20 PROCEDURE — 3075F PR MOST RECENT SYSTOLIC BLOOD PRESS GE 130-139MM HG: ICD-10-PCS | Mod: CPTII,S$GLB,, | Performed by: PHYSICIAN ASSISTANT

## 2023-12-20 PROCEDURE — 3008F PR BODY MASS INDEX (BMI) DOCUMENTED: ICD-10-PCS | Mod: CPTII,S$GLB,, | Performed by: PHYSICIAN ASSISTANT

## 2023-12-20 PROCEDURE — 70450 CT HEAD/BRAIN W/O DYE: CPT | Mod: 26,,, | Performed by: RADIOLOGY

## 2023-12-20 PROCEDURE — 1159F MED LIST DOCD IN RCRD: CPT | Mod: CPTII,S$GLB,, | Performed by: PHYSICIAN ASSISTANT

## 2023-12-20 PROCEDURE — 99214 OFFICE O/P EST MOD 30 MIN: CPT | Mod: S$GLB,,, | Performed by: PHYSICIAN ASSISTANT

## 2023-12-20 RX ORDER — OXYCODONE AND ACETAMINOPHEN 10; 325 MG/1; MG/1
1 TABLET ORAL EVERY 6 HOURS PRN
Qty: 30 TABLET | Refills: 0 | Status: SHIPPED | OUTPATIENT
Start: 2023-12-20 | End: 2023-12-27

## 2023-12-20 NOTE — PROGRESS NOTES
Neurosurgery  Established Patient     SUBJECTIVE:     History of Present Illness 4/13/23:  Patient is a 27-year-old female with idiopathic intracranial hypertension status post  shunt (certas 6) placed in 2017 at [facility] who presents today to establish care.  She does not have recent imaging for review for review but she does bring a copy of her last CT head from 2017.  Her preoperative symptoms included pressure headaches, balance difficulty and decline in her vision.  She reports the symptoms significantly improved postoperatively and has done well with her shunt until the past few months.  She now reports headaches have returned with increasing frequency and severity.  She notes new dizziness and ringing in her ears as well as nausea with the headaches.  She states her vision is stable.  Her last Neuro-Ophthalmology evaluation was done in 2021 at [facility].  She will obtain these records for review.  She describes her headaches as pressure and pulsatile.  They are not positional.  She takes ibuprofen and Tylenol without relief.  She does note a recent 40 lb weight loss.  Her Certas valve was adjusted from 5-6 in 2020 for concerns of over shunting.    Interval history 6/1/23:  Patient presents today for follow-up with imaging to evaluate her shunt.  She reports daily headaches increased in frequency since her last visit.  She describes them as pressure-like headaches with pounding.  They are sometimes positional. She was evaluated by our neuro ophthalmologist Dr. Richardson on 05/04.  There was no signs of optic disc edema at that time.  CT head dated 05/31/2023 shows slit-like configuration of the ventricles stable from prior study. Xray shunt series shows continuous shunt tubing without complication; Certas valve at 6.  Patient denies any nausea vomiting, vision changes or other neuro deficits since her last evaluation    Interval history 7/18/23:  Pt represents today for follow up after  her shunt was adjusted last visit. She reports that her HA are more frequent since her shunt adjustment last minute from Certas 6 to 7. Her shunt was adjusted due to concern for overshunting with slit like ventricles. She denies change in intensity. She feels that she did the best at a setting of 6. She denies vision changes, N/V or other new symptoms.    Interval history:  Pt presents for shunt follow up. She denies any new complaints today. She reports she continues to work with Neurology for her HA management and has been prescribed some new medication which she has not tried yet. She denies positional HA, progressively worsening HA, N/V, seizures or new focal deficits.      Review of patient's allergies indicates:  No Known Allergies    Current Outpatient Medications   Medication Sig Dispense Refill    ARIPiprazole (ABILIFY) 10 MG Tab Take 1 tablet (10 mg total) by mouth once daily. 90 tablet 0    buPROPion (WELLBUTRIN XL) 300 MG 24 hr tablet Take 1 tablet (300 mg total) by mouth once daily. 90 tablet 0    busPIRone (BUSPAR) 15 MG tablet Take 1 tablet (15 mg total) by mouth 2 (two) times daily. 180 tablet 0    cefadroxil (DURICEF) 500 MG Cap Take 1 capsule (500 mg total) by mouth every 12 (twelve) hours. for 5 days 10 capsule 0    dextroamphetamine-amphetamine (ADDERALL XR) 30 MG 24 hr capsule Take 1 capsule (30 mg total) by mouth every morning. 30 capsule 0    diazePAM (VALIUM) 5 MG tablet Take 1 tablet (5 mg total) by mouth 2 (two) times daily as needed for Anxiety. 60 tablet 2    fremanezumab-vfrm (AJOVY AUTOINJECTOR) 225 mg/1.5 mL autoinjector Inject 1.5 mLs (225 mg total) into the skin every 28 days. 1 each 11    gabapentin (NEURONTIN) 100 MG capsule Take 1 capsule (100 mg total) by mouth 2 (two) times daily. 60 capsule 0    lamoTRIgine (LAMICTAL) 100 MG tablet Take 1 tablet (100 mg total) by mouth once daily. 90 tablet 0    meloxicam (MOBIC) 15 MG tablet Take 1 tablet (15 mg total) by mouth once daily. 30  tablet 0    metFORMIN (GLUCOPHAGE-XR) 500 MG ER 24hr tablet Take 1 tablet (500 mg total) by mouth once daily for 14 days, THEN 1 tablet (500 mg total) 2 (two) times daily with meals for 14 days. 60 tablet 2    methocarbamoL (ROBAXIN) 750 MG Tab Take 2 tablets (1,500 mg total) by mouth 3 (three) times daily. for 15 days 90 tablet 0    ondansetron (ZOFRAN) 4 MG tablet Take 1 tablet (4 mg total) by mouth every 8 (eight) hours as needed for Nausea. 30 tablet 0    oxyCODONE (ROXICODONE) 10 mg Tab immediate release tablet Take 1 tablet (10 mg total) by mouth every 4 (four) hours as needed for Pain. 42 tablet 0    oxyCODONE-acetaminophen (PERCOCET)  mg per tablet Take 1 tablet by mouth every 6 (six) hours as needed for Pain. 30 tablet 0    rimegepant (NURTEC) 75 mg odt Take 1 tablet (75 mg total) by mouth every 72 hours as needed for Migraine (2 to 3 time per week prn max). Place ODT tablet on the tongue; alternatively the ODT tablet may be placed under the tongue 8 tablet 2    traZODone (DESYREL) 100 MG tablet Take 1 tablet (100 mg total) by mouth nightly as needed for Insomnia. 90 tablet 0     No current facility-administered medications for this visit.       Past Medical History:   Diagnosis Date    ADHD     Anxiety     Bipolar disorder     Depression     Idiopathic intracranial hypertension     Insomnia      Past Surgical History:   Procedure Laterality Date    brain shunt  2017    eye sheath finistration Bilateral     REPAIR OF LIGAMENT OF ANKLE Left 12/15/2023    Procedure: REPAIR, LIGAMENT, ANKLE;  Surgeon: Brandon Benjamin DPM;  Location: [facility] OR;  Service: Podiatry;  Laterality: Left;    REPAIR, TENDON, FLEXOR, LOWER EXTREMITY, SECONDARY, USING GRAFT IF INDICATED Left 12/15/2023    Procedure: REPAIR, TENDON, FLEXOR, LOWER EXTREMITY, SECONDARY, USING GRAFT IF INDICATED;  Surgeon: Brandon Benjamin DPM;  Location: [facility] OR;  Service: Podiatry;  Laterality: Left;     Family History       Problem Relation (Age  "of Onset)    Cancer Father    Depression Maternal Grandmother    Diabetes Maternal Grandmother    Heart disease Mother, Maternal Grandfather    Thyroid disease Maternal Grandmother          Social History     Socioeconomic History    Marital status: Single   Tobacco Use    Smoking status: Never    Smokeless tobacco: Never   Substance and Sexual Activity    Alcohol use: Yes     Comment: Drinks socially    Drug use: Never    Sexual activity: Yes     Partners: Female, Male     Birth control/protection: I.U.D.       OBJECTIVE:     Vital Signs  Pulse: 99  BP: 139/84  Pain Score: 0-No pain  Height: 5' 7" (170.2 cm)  Weight: 111.1 kg (245 lb) (per pt)  Body mass index is 38.37 kg/m².      Neurosurgery Physical Exam  General: well developed, well nourished, no distress.   Head: normocephalic, atraumatic  Neurologic: Alert and oriented. Thought content appropriate.  GCS: Motor: 6/Verbal: 5/Eyes: 4 GCS Total: 15  Mental Status: Awake, Alert, Oriented x3  Cranial nerves: face symmetric, tongue midline, CN II-XII grossly intact.   Eyes: pupils equal, round, reactive to light with accomodation, EOMI.   Sensory: intact to light touch throughout  Motor Strength: Moves all extremities spontaneously with good tone.  Full strength upper and lower extremities. No abnormal movements seen.   DTR's - 2 + and symmetric in UE and LE  Pronator Drift: no drift noted  Finger-to-nose: Intact bilaterally  Morgan: absent  Clonus: absent  Gait: normal      Diagnostic Results:  CT head dated 12/20/23 reviewed and shows right  shunt catheter with slit-like ventricles.  There is no detrimental interval change    ASSESSMENT/PLAN:     27-year-old female with IIH status post  shunt (Certas 7) who presents today for follow-up.  She reports her headaches  have been stable since last visit and she continues to work with Neurology for her headache management.  We discussed that I do not feel her shunt needs to be adjusted again today.  I encouraged " her to keep a headache diary after starting her new medication.  I will plan to see her back in 1 year for annual shunt follow-up.  She was encouraged to call us with any questions or concerns in the interim.        Claudia Araujo PA-C  Neurosurgery      Note dictated with voice recognition software, please excuse any grammatical errors.

## 2024-01-02 ENCOUNTER — OFFICE VISIT (OUTPATIENT)
Dept: PODIATRY | Facility: CLINIC | Age: 29
End: 2024-01-02
Payer: COMMERCIAL

## 2024-01-02 VITALS — WEIGHT: 245 LBS | HEIGHT: 67 IN | BODY MASS INDEX: 38.45 KG/M2

## 2024-01-02 DIAGNOSIS — M25.572 PAIN AND SWELLING OF LEFT ANKLE: ICD-10-CM

## 2024-01-02 DIAGNOSIS — S86.312S TEAR OF PERONEAL TENDON, LEFT, SEQUELA: ICD-10-CM

## 2024-01-02 DIAGNOSIS — M25.472 PAIN AND SWELLING OF LEFT ANKLE: ICD-10-CM

## 2024-01-02 DIAGNOSIS — S93.492D SPRAIN OF ANTERIOR TALOFIBULAR LIGAMENT OF LEFT ANKLE, SUBSEQUENT ENCOUNTER: ICD-10-CM

## 2024-01-02 DIAGNOSIS — G57.92 NEURITIS OF ANKLE, LEFT: ICD-10-CM

## 2024-01-02 DIAGNOSIS — Z09 POSTOP CHECK: Primary | ICD-10-CM

## 2024-01-02 PROCEDURE — 1160F RVW MEDS BY RX/DR IN RCRD: CPT | Mod: CPTII,S$GLB,, | Performed by: PODIATRIST

## 2024-01-02 PROCEDURE — 1159F MED LIST DOCD IN RCRD: CPT | Mod: CPTII,S$GLB,, | Performed by: PODIATRIST

## 2024-01-02 PROCEDURE — 99024 POSTOP FOLLOW-UP VISIT: CPT | Mod: S$GLB,,, | Performed by: PODIATRIST

## 2024-01-02 PROCEDURE — 99999 PR PBB SHADOW E&M-EST. PATIENT-LVL III: CPT | Mod: PBBFAC,,, | Performed by: PODIATRIST

## 2024-01-02 RX ORDER — GABAPENTIN 300 MG/1
300 CAPSULE ORAL 2 TIMES DAILY
Qty: 60 CAPSULE | Refills: 1 | Status: SHIPPED | OUTPATIENT
Start: 2024-01-02 | End: 2024-02-09

## 2024-01-09 ENCOUNTER — TELEPHONE (OUTPATIENT)
Dept: PSYCHIATRY | Facility: CLINIC | Age: 29
End: 2024-01-09
Payer: COMMERCIAL

## 2024-01-10 ENCOUNTER — PATIENT MESSAGE (OUTPATIENT)
Dept: PODIATRY | Facility: CLINIC | Age: 29
End: 2024-01-10
Payer: COMMERCIAL

## 2024-01-10 DIAGNOSIS — M25.572 PAIN AND SWELLING OF LEFT ANKLE: Primary | ICD-10-CM

## 2024-01-10 DIAGNOSIS — M25.472 PAIN AND SWELLING OF LEFT ANKLE: Primary | ICD-10-CM

## 2024-01-10 RX ORDER — CLINDAMYCIN HYDROCHLORIDE 300 MG/1
300 CAPSULE ORAL EVERY 8 HOURS
Qty: 21 CAPSULE | Refills: 0 | Status: SHIPPED | OUTPATIENT
Start: 2024-01-10 | End: 2024-01-17

## 2024-01-10 RX ORDER — CIPROFLOXACIN 500 MG/1
500 TABLET ORAL EVERY 12 HOURS
Qty: 14 TABLET | Refills: 0 | Status: SHIPPED | OUTPATIENT
Start: 2024-01-10 | End: 2024-01-17

## 2024-01-10 NOTE — PROGRESS NOTES
"MEDICATION MANAGEMENT SESSION    Name: Rafaela Zuniga  Age: 28 y.o.  : 1995    Preferred Name: Rafaela    Referring provider: Brenda Acosta PA-C    Reason for Visit:  Medication follow up    Summary of Visit:  Pt reports her mood has been stable and "good" since last visit. She had foot surgery on 12/15/23 then spent the holidays in [place] with her family. She has had complications with her foot and is still in boot for now but is able to drive and returned to work when school resumed this week. The increase to Adderall XR 30 mg qd since last visit is managing her ADHD symptoms well; discussed with pt remaining at current medication doses.    Current Symptoms:   ADHD: Symptoms well managed with Adderall XR 30 mg qd   Depressive Disorder: denied   Anxiety Disorder: anxiety/nervousness   Panic Disorder: denied   Manic Disorder: denied   Psychotic Disorder: denied   Substance Use:  Alcohol: Socially once per month at most, 1-2 glasses of wine.     Review of Systems   Constitutional:  Positive for fatigue. Negative for activity change, appetite change and unexpected weight change.   Respiratory:  Negative for shortness of breath.    Psychiatric/Behavioral:  Negative for agitation, behavioral problems, confusion, decreased concentration, dysphoric mood, hallucinations, self-injury, sleep disturbance and suicidal ideas. The patient is not nervous/anxious and is not hyperactive.       Constitutional:  Vitals:  Most recent vital signs were reviewed.   Last 3 sets of Vitals        2023     1:29 PM 2024    11:25 AM 2024     4:33 PM   Vitals - 1 value per visit   SYSTOLIC 139  138   DIASTOLIC 84  80   Pulse 99  93   Weight (lb) 245 245    Weight (kg) 111.131 111.131    Height 5' 7" (1.702 m) 5' 7" (1.702 m)    BMI (Calculated) 38.4 38.4    Pain Score Zero Four           Psychiatric:  Oriented: x 3   Attitude: cooperative   Eye Contact: good   Behavior: wnl   Mood: "really good"  Affect: " appropriate range   Attention: intact   Concentration: grossly intact   Thought Process: goal directed   Speech: intelligible  Volume: WNL   Quantity: WNL   Rhythm: WNL  Insight: fair to good   Threats: no SI / HI   Memory: Grossly intact  Psychosis: denies all   Estimate of Intellectual Function: average   Judgment: fair to good   Relevant Elements of Neurological Exam: limp due to boot from foot surgery    Allergy Review:   Review of patient's allergies indicates:  No Known Allergies     Medical Problem List:   Patient Active Problem List   Diagnosis    Bipolar disorder, in full remission, most recent episode mixed    Idiopathic intracranial hypertension    Presence of ventricular shunt    IUD (intrauterine device) in place    BMI 36.0-36.9,adult    ISAAC (generalized anxiety disorder)    Classical migraine with intractable migraine    Chronic mixed headache syndrome    Left ankle sprain    Insomnia    ADHD      Encounter Diagnoses   Name Primary?    Attention deficit hyperactivity disorder (ADHD), combined type     Insomnia due to mental condition     Eating disorder, unspecified type     Personal history of nonsuicidal self-injury     History of trauma     Bipolar 1 disorder, depressed, moderate     Generalized anxiety disorder with panic attacks Yes    Bipolar disorder, in full remission, most recent episode mixed       PLAN:  Medication Management: Continue current medications.    Follow up in about 3 months (around 4/11/2024) for Medication follow up.     Medication List with Changes/Refills   New Medications    DEXTROAMPHETAMINE-AMPHETAMINE (ADDERALL XR) 30 MG 24 HR CAPSULE    Take 1 capsule (30 mg total) by mouth every morning.    DEXTROAMPHETAMINE-AMPHETAMINE (ADDERALL XR) 30 MG 24 HR CAPSULE    Take 1 capsule (30 mg total) by mouth every morning.   Current Medications    CIPROFLOXACIN HCL (CIPRO) 500 MG TABLET    Take 1 tablet (500 mg total) by mouth every 12 (twelve) hours. for 7 days    CLINDAMYCIN  (CLEOCIN) 300 MG CAPSULE    Take 1 capsule (300 mg total) by mouth every 8 (eight) hours. for 7 days    FREMANEZUMAB-VFRM (AJOVY AUTOINJECTOR) 225 MG/1.5 ML AUTOINJECTOR    Inject 1.5 mLs (225 mg total) into the skin every 28 days.    GABAPENTIN (NEURONTIN) 300 MG CAPSULE    Take 1 capsule (300 mg total) by mouth 2 (two) times daily.    MELOXICAM (MOBIC) 15 MG TABLET    Take 1 tablet (15 mg total) by mouth once daily.    METFORMIN (GLUCOPHAGE-XR) 500 MG ER 24HR TABLET    Take 1 tablet (500 mg total) by mouth once daily for 14 days, THEN 1 tablet (500 mg total) 2 (two) times daily with meals for 14 days.    ONDANSETRON (ZOFRAN) 4 MG TABLET    Take 1 tablet (4 mg total) by mouth every 8 (eight) hours as needed for Nausea.    RIMEGEPANT (NURTEC) 75 MG ODT    Take 1 tablet (75 mg total) by mouth every 72 hours as needed for Migraine (2 to 3 time per week prn max). Place ODT tablet on the tongue; alternatively the ODT tablet may be placed under the tongue   Changed and/or Refilled Medications    Modified Medication Previous Medication    ARIPIPRAZOLE (ABILIFY) 10 MG TAB ARIPiprazole (ABILIFY) 10 MG Tab       Take 1 tablet (10 mg total) by mouth once daily.    Take 1 tablet (10 mg total) by mouth once daily.    BUPROPION (WELLBUTRIN XL) 300 MG 24 HR TABLET buPROPion (WELLBUTRIN XL) 300 MG 24 hr tablet       Take 1 tablet (300 mg total) by mouth once daily.    Take 1 tablet (300 mg total) by mouth once daily.    BUSPIRONE (BUSPAR) 15 MG TABLET busPIRone (BUSPAR) 15 MG tablet       Take 1 tablet (15 mg total) by mouth 2 (two) times daily.    Take 1 tablet (15 mg total) by mouth 2 (two) times daily.    DEXTROAMPHETAMINE-AMPHETAMINE (ADDERALL XR) 30 MG 24 HR CAPSULE dextroamphetamine-amphetamine (ADDERALL XR) 30 MG 24 hr capsule       Take 1 capsule (30 mg total) by mouth every morning.    Take 1 capsule (30 mg total) by mouth every morning.    DIAZEPAM (VALIUM) 5 MG TABLET diazePAM (VALIUM) 5 MG tablet       Take 1 tablet (5  mg total) by mouth 2 (two) times daily as needed for Anxiety.    Take 1 tablet (5 mg total) by mouth 2 (two) times daily as needed for Anxiety.    LAMOTRIGINE (LAMICTAL) 100 MG TABLET lamoTRIgine (LAMICTAL) 100 MG tablet       Take 1 tablet (100 mg total) by mouth once daily.    Take 1 tablet (100 mg total) by mouth once daily.    TRAZODONE (DESYREL) 100 MG TABLET traZODone (DESYREL) 100 MG tablet       Take 1 tablet (100 mg total) by mouth nightly as needed for Insomnia.    Take 1 tablet (100 mg total) by mouth nightly as needed for Insomnia.      Time spent with pt including note preparation: 30 minutes     Jessica Warner, PhD, MP  Medical Psychologist

## 2024-01-11 ENCOUNTER — OFFICE VISIT (OUTPATIENT)
Dept: PSYCHIATRY | Facility: CLINIC | Age: 29
End: 2024-01-11
Payer: COMMERCIAL

## 2024-01-11 ENCOUNTER — OFFICE VISIT (OUTPATIENT)
Dept: PODIATRY | Facility: CLINIC | Age: 29
End: 2024-01-11
Payer: COMMERCIAL

## 2024-01-11 VITALS — DIASTOLIC BLOOD PRESSURE: 80 MMHG | HEART RATE: 93 BPM | SYSTOLIC BLOOD PRESSURE: 138 MMHG

## 2024-01-11 DIAGNOSIS — F90.2 ATTENTION DEFICIT HYPERACTIVITY DISORDER (ADHD), COMBINED TYPE: ICD-10-CM

## 2024-01-11 DIAGNOSIS — S93.492D SPRAIN OF ANTERIOR TALOFIBULAR LIGAMENT OF LEFT ANKLE, SUBSEQUENT ENCOUNTER: ICD-10-CM

## 2024-01-11 DIAGNOSIS — Z87.828 HISTORY OF TRAUMA: ICD-10-CM

## 2024-01-11 DIAGNOSIS — F31.32 BIPOLAR 1 DISORDER, DEPRESSED, MODERATE: ICD-10-CM

## 2024-01-11 DIAGNOSIS — Z91.52 PERSONAL HISTORY OF NONSUICIDAL SELF-INJURY: ICD-10-CM

## 2024-01-11 DIAGNOSIS — S86.312S TEAR OF PERONEAL TENDON, LEFT, SEQUELA: ICD-10-CM

## 2024-01-11 DIAGNOSIS — F51.05 INSOMNIA DUE TO MENTAL CONDITION: ICD-10-CM

## 2024-01-11 DIAGNOSIS — T81.89XA DELAYED SURGICAL WOUND HEALING, INITIAL ENCOUNTER: ICD-10-CM

## 2024-01-11 DIAGNOSIS — F41.1 GENERALIZED ANXIETY DISORDER WITH PANIC ATTACKS: Primary | ICD-10-CM

## 2024-01-11 DIAGNOSIS — F31.78 BIPOLAR DISORDER, IN FULL REMISSION, MOST RECENT EPISODE MIXED: ICD-10-CM

## 2024-01-11 DIAGNOSIS — L03.116 CELLULITIS OF LEFT ANKLE: ICD-10-CM

## 2024-01-11 DIAGNOSIS — F50.9 EATING DISORDER, UNSPECIFIED TYPE: ICD-10-CM

## 2024-01-11 DIAGNOSIS — F41.0 GENERALIZED ANXIETY DISORDER WITH PANIC ATTACKS: Primary | ICD-10-CM

## 2024-01-11 DIAGNOSIS — Z09 POSTOP CHECK: Primary | ICD-10-CM

## 2024-01-11 PROCEDURE — 1160F RVW MEDS BY RX/DR IN RCRD: CPT | Mod: CPTII,S$GLB,, | Performed by: PODIATRIST

## 2024-01-11 PROCEDURE — 1159F MED LIST DOCD IN RCRD: CPT | Mod: CPTII,S$GLB,, | Performed by: PODIATRIST

## 2024-01-11 PROCEDURE — 3075F SYST BP GE 130 - 139MM HG: CPT | Mod: CPTII,S$GLB,, | Performed by: PSYCHOLOGIST

## 2024-01-11 PROCEDURE — 3079F DIAST BP 80-89 MM HG: CPT | Mod: CPTII,S$GLB,, | Performed by: PSYCHOLOGIST

## 2024-01-11 PROCEDURE — 99999 PR PBB SHADOW E&M-EST. PATIENT-LVL II: CPT | Mod: PBBFAC,,, | Performed by: PSYCHOLOGIST

## 2024-01-11 PROCEDURE — 99214 OFFICE O/P EST MOD 30 MIN: CPT | Mod: S$GLB,,, | Performed by: PSYCHOLOGIST

## 2024-01-11 PROCEDURE — 99024 POSTOP FOLLOW-UP VISIT: CPT | Mod: S$GLB,,, | Performed by: PODIATRIST

## 2024-01-11 PROCEDURE — 99999 PR PBB SHADOW E&M-EST. PATIENT-LVL II: CPT | Mod: PBBFAC,,, | Performed by: PODIATRIST

## 2024-01-11 PROCEDURE — 1159F MED LIST DOCD IN RCRD: CPT | Mod: CPTII,S$GLB,, | Performed by: PSYCHOLOGIST

## 2024-01-11 RX ORDER — DEXTROAMPHETAMINE SACCHARATE, AMPHETAMINE ASPARTATE MONOHYDRATE, DEXTROAMPHETAMINE SULFATE AND AMPHETAMINE SULFATE 7.5; 7.5; 7.5; 7.5 MG/1; MG/1; MG/1; MG/1
30 CAPSULE, EXTENDED RELEASE ORAL EVERY MORNING
Qty: 30 CAPSULE | Refills: 0 | Status: SHIPPED | OUTPATIENT
Start: 2024-02-09

## 2024-01-11 RX ORDER — TRAZODONE HYDROCHLORIDE 100 MG/1
100 TABLET ORAL NIGHTLY PRN
Qty: 90 TABLET | Refills: 0 | Status: SHIPPED | OUTPATIENT
Start: 2024-01-11 | End: 2024-04-10

## 2024-01-11 RX ORDER — DIAZEPAM 5 MG/1
5 TABLET ORAL 2 TIMES DAILY PRN
Qty: 60 TABLET | Refills: 2 | Status: SHIPPED | OUTPATIENT
Start: 2024-01-11 | End: 2024-04-10

## 2024-01-11 RX ORDER — LAMOTRIGINE 100 MG/1
100 TABLET ORAL DAILY
Qty: 90 TABLET | Refills: 0 | Status: SHIPPED | OUTPATIENT
Start: 2024-01-11 | End: 2024-04-10

## 2024-01-11 RX ORDER — DEXTROAMPHETAMINE SACCHARATE, AMPHETAMINE ASPARTATE MONOHYDRATE, DEXTROAMPHETAMINE SULFATE AND AMPHETAMINE SULFATE 7.5; 7.5; 7.5; 7.5 MG/1; MG/1; MG/1; MG/1
30 CAPSULE, EXTENDED RELEASE ORAL EVERY MORNING
Qty: 30 CAPSULE | Refills: 0 | Status: SHIPPED | OUTPATIENT
Start: 2024-01-11

## 2024-01-11 RX ORDER — BUSPIRONE HYDROCHLORIDE 15 MG/1
15 TABLET ORAL 2 TIMES DAILY
Qty: 180 TABLET | Refills: 0 | Status: SHIPPED | OUTPATIENT
Start: 2024-01-11 | End: 2024-04-10

## 2024-01-11 RX ORDER — BUPROPION HYDROCHLORIDE 300 MG/1
300 TABLET ORAL DAILY
Qty: 90 TABLET | Refills: 0 | Status: SHIPPED | OUTPATIENT
Start: 2024-01-11 | End: 2024-04-10

## 2024-01-11 RX ORDER — DEXTROAMPHETAMINE SACCHARATE, AMPHETAMINE ASPARTATE MONOHYDRATE, DEXTROAMPHETAMINE SULFATE AND AMPHETAMINE SULFATE 7.5; 7.5; 7.5; 7.5 MG/1; MG/1; MG/1; MG/1
30 CAPSULE, EXTENDED RELEASE ORAL EVERY MORNING
Qty: 30 CAPSULE | Refills: 0 | Status: SHIPPED | OUTPATIENT
Start: 2024-03-08

## 2024-01-11 RX ORDER — ARIPIPRAZOLE 10 MG/1
10 TABLET ORAL DAILY
Qty: 90 TABLET | Refills: 0 | Status: SHIPPED | OUTPATIENT
Start: 2024-01-11 | End: 2024-04-10

## 2024-01-11 NOTE — PROGRESS NOTES
"Subjective:       Patient ID: Rafaela Zuniga is a 28 y.o. female.    Chief Complaint: Post-op Evaluation (Post opEvaluation pt was last seen by Charmaine Harris NP on 12/5/2023, pt is wearing medical boot on left foot and tennis on right, rate pain 4/10, nondiabetic / /)      HPI:  Rafaela Zuniga presents to the office today, s/p 12/15/23 LLE ATFL repair and peroneal repair. WB with CAM Walker. States moderate pains and edema, both slightly increased from prior. She did send me images on yesterday of the incision, which appeared red and mildly dehisced. I did put her on PO Cleocin and Ciprofloxacin. States improved redness and swelling. States numbness. States RICE therapy.     No results found for: "HGBA1C"    Review of patient's allergies indicates:  No Known Allergies    Past Medical History:   Diagnosis Date    ADHD     Anxiety     Bipolar disorder     Depression     Idiopathic intracranial hypertension     Insomnia        Family History   Problem Relation Age of Onset    Heart disease Mother     Cancer Father     Diabetes Maternal Grandmother     Depression Maternal Grandmother     Thyroid disease Maternal Grandmother     Heart disease Maternal Grandfather     Breast cancer Neg Hx     Ovarian cancer Neg Hx     Colon cancer Neg Hx        Social History     Socioeconomic History    Marital status: Single   Tobacco Use    Smoking status: Never    Smokeless tobacco: Never   Substance and Sexual Activity    Alcohol use: Yes     Comment: Drinks socially    Drug use: Never    Sexual activity: Yes     Partners: Female, Male     Birth control/protection: I.U.D.       Past Surgical History:   Procedure Laterality Date    brain shunt  2017    eye sheath finistration Bilateral     REPAIR OF LIGAMENT OF ANKLE Left 12/15/2023    Procedure: REPAIR, LIGAMENT, ANKLE;  Surgeon: Brandon Benjamin DPM;  Location: [facility] OR;  Service: Podiatry;  Laterality: Left;    REPAIR, TENDON, FLEXOR, LOWER EXTREMITY, SECONDARY, USING " GRAFT IF INDICATED Left 12/15/2023    Procedure: REPAIR, TENDON, FLEXOR, LOWER EXTREMITY, SECONDARY, USING GRAFT IF INDICATED;  Surgeon: Brandon Benjamin DPM;  Location: [facility] OR;  Service: Podiatry;  Laterality: Left;       Review of Systems   Constitutional:  Negative for chills, fatigue and fever.   HENT:  Negative for hearing loss.    Eyes:  Negative for photophobia and visual disturbance.   Respiratory:  Negative for cough, chest tightness, shortness of breath and wheezing.    Cardiovascular:  Negative for chest pain and palpitations.   Gastrointestinal:  Negative for constipation, diarrhea, nausea and vomiting.   Endocrine: Negative for cold intolerance and heat intolerance.   Genitourinary:  Negative for flank pain.   Musculoskeletal:  Positive for gait problem. Negative for neck pain and neck stiffness.   Neurological:  Negative for light-headedness and headaches.   Psychiatric/Behavioral:  Negative for sleep disturbance.           Objective:   There were no vitals taken for this visit.                        Physical Exam  LOWER EXTREMITY PHYSICAL EXAMINATION    NEUROLOGY: Proprioception is intact. Sensation to light touch is intact. Rosemary-incisional sensation is decreased.    DERMATOLOGY: Skin is supple, dry and intact. Minimal delayed epidermal healing is noted. No drainage or abscess or location infection is noted, save for mild cellulitis.    ORTHOPEDIC: MMT is appropriate. Moderate lateral ankle edema is noted. Mild pains to palpation to the areas of incision.      Assessment:     1. Postop check    2. Sprain of anterior talofibular ligament of left ankle, subsequent encounter    3. Tear of peroneal tendon, left, sequela    4. Delayed surgical wound healing, initial encounter          Plan:     Postop check    Sprain of anterior talofibular ligament of left ankle, subsequent encounter    Tear of peroneal tendon, left, sequela    Delayed surgical wound healing, initial encounter      Thorough discussion  is had with the patient today, concerning the diagnosis, its etiology, and the treatment algorithm at present.     Continue CAM Walker for now.    HOLD PT/OT for now.    The wound was surgically debrided after adequate prep with alcohol and/or betadine paint. Excisional wound debridement was performed using sharp #10/#15 blade/rounded scalpel and tissue nipper, with removal of all non-viable skin and soft tissues; necrotic skin/tissue formation. The woundbase/wound bed was also debrided to encourage bleeding as to promote/stimulate healing. Debridement was excisional and included epidermal, dermal and subcutaneous tissues. Post debridement measurements are as above. Hemostasis was achieved. Patient tolerated procedure well and without complications. Local woundcare with collagen dressings and bandage thereafter.    Change dressings on this Sunday and convert to [facility].    RICE therapy.            Future Appointments   Date Time Provider Department Center   1/11/2024  4:30 PM Jessica Warner, PhD [facility] PS[facility]CH [facility]   1/18/2024  3:45 PM Brandon Benjamin, SULEMAM [facility] POD [facility]   2/13/2024 11:30 AM Brandon Benjamin, SULEMAM [facility]   4/3/2024  3:30 PM Lupis Valdivia NP [facility] NEURO [facility]   8/1/2024  2:00 PM Brenda Acosta PA-C [facility] IM [facility]

## 2024-01-18 ENCOUNTER — OFFICE VISIT (OUTPATIENT)
Dept: PODIATRY | Facility: CLINIC | Age: 29
End: 2024-01-18
Payer: COMMERCIAL

## 2024-01-18 VITALS — HEIGHT: 67 IN | WEIGHT: 245 LBS | BODY MASS INDEX: 38.45 KG/M2

## 2024-01-18 DIAGNOSIS — M25.472 PAIN AND SWELLING OF LEFT ANKLE: ICD-10-CM

## 2024-01-18 DIAGNOSIS — L03.116 CELLULITIS OF LEFT ANKLE: ICD-10-CM

## 2024-01-18 DIAGNOSIS — S93.492D SPRAIN OF ANTERIOR TALOFIBULAR LIGAMENT OF LEFT ANKLE, SUBSEQUENT ENCOUNTER: ICD-10-CM

## 2024-01-18 DIAGNOSIS — M25.572 PAIN AND SWELLING OF LEFT ANKLE: ICD-10-CM

## 2024-01-18 DIAGNOSIS — T81.89XD DELAYED SURGICAL WOUND HEALING, SUBSEQUENT ENCOUNTER: ICD-10-CM

## 2024-01-18 DIAGNOSIS — S86.312S TEAR OF PERONEAL TENDON, LEFT, SEQUELA: ICD-10-CM

## 2024-01-18 DIAGNOSIS — Z09 POSTOP CHECK: Primary | ICD-10-CM

## 2024-01-18 PROCEDURE — 87075 CULTR BACTERIA EXCEPT BLOOD: CPT | Performed by: PODIATRIST

## 2024-01-18 PROCEDURE — 99024 POSTOP FOLLOW-UP VISIT: CPT | Mod: S$GLB,,, | Performed by: PODIATRIST

## 2024-01-18 PROCEDURE — 99999 PR PBB SHADOW E&M-EST. PATIENT-LVL IV: CPT | Mod: PBBFAC,,, | Performed by: PODIATRIST

## 2024-01-18 PROCEDURE — 1160F RVW MEDS BY RX/DR IN RCRD: CPT | Mod: CPTII,S$GLB,, | Performed by: PODIATRIST

## 2024-01-18 PROCEDURE — 1159F MED LIST DOCD IN RCRD: CPT | Mod: CPTII,S$GLB,, | Performed by: PODIATRIST

## 2024-01-18 PROCEDURE — 87070 CULTURE OTHR SPECIMN AEROBIC: CPT | Performed by: PODIATRIST

## 2024-01-18 RX ORDER — SULFAMETHOXAZOLE AND TRIMETHOPRIM 800; 160 MG/1; MG/1
1 TABLET ORAL 2 TIMES DAILY
Qty: 20 TABLET | Refills: 0 | Status: SHIPPED | OUTPATIENT
Start: 2024-01-18 | End: 2024-01-28

## 2024-01-18 NOTE — PROGRESS NOTES
"Subjective:       Patient ID: Rafaela Zuniga is a 28 y.o. female.    Chief Complaint: Post-op Evaluation (Post op, pt was last seen by PCP Charmaine Harris NP at 12/5/2023, rate pain 5/10, nondiabetic, pt is wearing cam boot on left foot and boots on right)      HPI:  Rafaela Zuniga presents to the office today, s/p 12/15/23 LLE ATFL repair and peroneal repair. WB with CAM Walker. States mid pains and edema. Did complete PO Cleocin and Ciprofloxacin. States less redness. States QD woundcare with MediHoney. States numbness. States RICE therapy.     No results found for: "HGBA1C"    Review of patient's allergies indicates:  No Known Allergies    Past Medical History:   Diagnosis Date    ADHD     Anxiety     Bipolar disorder     Depression     Idiopathic intracranial hypertension     Insomnia        Family History   Problem Relation Age of Onset    Heart disease Mother     Cancer Father     Diabetes Maternal Grandmother     Depression Maternal Grandmother     Thyroid disease Maternal Grandmother     Heart disease Maternal Grandfather     Breast cancer Neg Hx     Ovarian cancer Neg Hx     Colon cancer Neg Hx        Social History     Socioeconomic History    Marital status: Single   Tobacco Use    Smoking status: Never    Smokeless tobacco: Never   Substance and Sexual Activity    Alcohol use: Yes     Comment: Drinks socially    Drug use: Never    Sexual activity: Yes     Partners: Female, Male     Birth control/protection: I.U.D.       Past Surgical History:   Procedure Laterality Date    brain shunt  2017    eye sheath finistration Bilateral     REPAIR OF LIGAMENT OF ANKLE Left 12/15/2023    Procedure: REPAIR, LIGAMENT, ANKLE;  Surgeon: Brandon Benjamin DPM;  Location: [facility];  Service: Podiatry;  Laterality: Left;    REPAIR, TENDON, FLEXOR, LOWER EXTREMITY, SECONDARY, USING GRAFT IF INDICATED Left 12/15/2023    Procedure: REPAIR, TENDON, FLEXOR, LOWER EXTREMITY, SECONDARY, USING GRAFT IF INDICATED;  " "Surgeon: Brandon Benjamin DPM;  Location: [facility];  Service: Podiatry;  Laterality: Left;       Review of Systems   Constitutional:  Negative for chills, fatigue and fever.   HENT:  Negative for hearing loss.    Eyes:  Negative for photophobia and visual disturbance.   Respiratory:  Negative for cough, chest tightness, shortness of breath and wheezing.    Cardiovascular:  Negative for chest pain and palpitations.   Gastrointestinal:  Negative for constipation, diarrhea, nausea and vomiting.   Endocrine: Negative for cold intolerance and heat intolerance.   Genitourinary:  Negative for flank pain.   Musculoskeletal:  Positive for gait problem. Negative for neck pain and neck stiffness.   Skin:  Positive for color change and wound.   Neurological:  Negative for light-headedness and headaches.   Psychiatric/Behavioral:  Negative for sleep disturbance.           Objective:   Ht 5' 7" (1.702 m)   Wt 111.1 kg (245 lb)   BMI 38.37 kg/m²               Physical Exam  LOWER EXTREMITY PHYSICAL EXAMINATION    NEUROLOGY: Proprioception is intact. Sensation to light touch is intact. Rosemary-incisional sensation is decreased.    DERMATOLOGY: Skin is supple, dry and intact. Minimal delayed epidermal healing is noted. No drainage or abscess is noted. Minimal, and improved localized cellulitis is noted. No fluctuance.     ORTHOPEDIC: MMT is appropriate. Moderate lateral ankle edema is noted. Mild pains to palpation to the areas of incision.      Assessment:     1. Postop check    2. Sprain of anterior talofibular ligament of left ankle, subsequent encounter    3. Tear of peroneal tendon, left, sequela    4. Delayed surgical wound healing, subsequent encounter    5. Pain and swelling of left ankle    6. Cellulitis of left ankle          Plan:     Postop check    Sprain of anterior talofibular ligament of left ankle, subsequent encounter    Tear of peroneal tendon, left, sequela    Delayed surgical wound healing, subsequent " encounter  -     CULTURE, ANAEROBE  -     Aerobic culture (Specify Source)  -     collagenase (SANTYL) ointment; Apply topically once daily.  Dispense: 30 g; Refill: 1    Pain and swelling of left ankle  -     sulfamethoxazole-trimethoprim 800-160mg (BACTRIM DS) 800-160 mg Tab; Take 1 tablet by mouth 2 (two) times daily. for 10 days  Dispense: 20 tablet; Refill: 0    Cellulitis of left ankle  -     sulfamethoxazole-trimethoprim 800-160mg (BACTRIM DS) 800-160 mg Tab; Take 1 tablet by mouth 2 (two) times daily. for 10 days  Dispense: 20 tablet; Refill: 0      Thorough discussion is had with the patient today, concerning the diagnosis, its etiology, and the treatment algorithm at present.     Continue CAM Walker.    HOLD PT/OT.    The wound was surgically debrided after adequate prep with alcohol and/or betadine paint. Excisional wound debridement was performed using sharp #10/#15 blade/rounded scalpel and tissue nipper, with removal of all non-viable skin and soft tissues; necrotic skin/tissue formation. The woundbase/wound bed was also debrided to encourage bleeding as to promote/stimulate healing. Debridement was excisional and included epidermal, dermal and subcutaneous tissues. Post debridement measurements are as above. Hemostasis was achieved. Patient tolerated procedure well and without complications. Local woundcare with MediHoney and bandages thereafter.    Rx for Santyl wet-2-dry.    RICE therapy.            Future Appointments   Date Time Provider Department Center   2/13/2024 11:30 AM Brandon Benjamin DPM [facility]   4/3/2024  3:30 PM Lupis Valdivia NP [facility]   8/1/2024  2:00 PM Brenda Acosta PA-C [facility]

## 2024-01-22 LAB — BACTERIA SPEC AEROBE CULT: NORMAL

## 2024-01-23 LAB — BACTERIA SPEC ANAEROBE CULT: NORMAL

## 2024-01-29 ENCOUNTER — OFFICE VISIT (OUTPATIENT)
Dept: PODIATRY | Facility: CLINIC | Age: 29
End: 2024-01-29
Payer: COMMERCIAL

## 2024-01-29 VITALS — BODY MASS INDEX: 38.45 KG/M2 | WEIGHT: 245 LBS | HEIGHT: 67 IN

## 2024-01-29 DIAGNOSIS — Z09 POSTOP CHECK: Primary | ICD-10-CM

## 2024-01-29 DIAGNOSIS — M25.472 PAIN AND SWELLING OF LEFT ANKLE: ICD-10-CM

## 2024-01-29 DIAGNOSIS — T81.89XD DELAYED SURGICAL WOUND HEALING, SUBSEQUENT ENCOUNTER: ICD-10-CM

## 2024-01-29 DIAGNOSIS — S86.312S TEAR OF PERONEAL TENDON, LEFT, SEQUELA: ICD-10-CM

## 2024-01-29 DIAGNOSIS — M25.572 PAIN AND SWELLING OF LEFT ANKLE: ICD-10-CM

## 2024-01-29 DIAGNOSIS — S93.492D SPRAIN OF ANTERIOR TALOFIBULAR LIGAMENT OF LEFT ANKLE, SUBSEQUENT ENCOUNTER: ICD-10-CM

## 2024-01-29 PROCEDURE — 1159F MED LIST DOCD IN RCRD: CPT | Mod: CPTII,S$GLB,, | Performed by: PODIATRIST

## 2024-01-29 PROCEDURE — 99999 PR PBB SHADOW E&M-EST. PATIENT-LVL IV: CPT | Mod: PBBFAC,,, | Performed by: PODIATRIST

## 2024-01-29 PROCEDURE — 1160F RVW MEDS BY RX/DR IN RCRD: CPT | Mod: CPTII,S$GLB,, | Performed by: PODIATRIST

## 2024-01-29 PROCEDURE — 99024 POSTOP FOLLOW-UP VISIT: CPT | Mod: S$GLB,,, | Performed by: PODIATRIST

## 2024-01-29 RX ORDER — SULFAMETHOXAZOLE AND TRIMETHOPRIM 800; 160 MG/1; MG/1
1 TABLET ORAL 2 TIMES DAILY
Qty: 14 TABLET | Refills: 0 | Status: SHIPPED | OUTPATIENT
Start: 2024-01-29 | End: 2024-02-05

## 2024-01-29 NOTE — PROGRESS NOTES
"Subjective:       Patient ID: Rafaela Zuniga is a 28 y.o. female.    Chief Complaint: Post-op Evaluation (Coming in for s/p on the right foot, pt wound is healing well , pt rates pain today 4/10 , pt is non-diabetic)      HPI:  Rafaela Zuniga presents to the office today, s/p 12/15/23 LLE ATFL repair and peroneal repair. WB with CAM Walker. States mid pains and edema. States neuritis. Did complete Bactrim recently. States resolved redness. States QD woundcare with Santyl. States RICE therapy.     No results found for: "HGBA1C"    Review of patient's allergies indicates:  No Known Allergies    Past Medical History:   Diagnosis Date    ADHD     Anxiety     Bipolar disorder     Depression     Idiopathic intracranial hypertension     Insomnia        Family History   Problem Relation Age of Onset    Heart disease Mother     Cancer Father     Diabetes Maternal Grandmother     Depression Maternal Grandmother     Thyroid disease Maternal Grandmother     Heart disease Maternal Grandfather     Breast cancer Neg Hx     Ovarian cancer Neg Hx     Colon cancer Neg Hx        Social History     Socioeconomic History    Marital status: Single   Tobacco Use    Smoking status: Never    Smokeless tobacco: Never   Substance and Sexual Activity    Alcohol use: Yes     Comment: Drinks socially    Drug use: Never    Sexual activity: Yes     Partners: Female, Male     Birth control/protection: I.U.D.       Past Surgical History:   Procedure Laterality Date    brain shunt  2017    eye sheath finistration Bilateral     REPAIR OF LIGAMENT OF ANKLE Left 12/15/2023    Procedure: REPAIR, LIGAMENT, ANKLE;  Surgeon: Brandon Benjamin DPM;  Location: [facility];  Service: Podiatry;  Laterality: Left;    REPAIR, TENDON, FLEXOR, LOWER EXTREMITY, SECONDARY, USING GRAFT IF INDICATED Left 12/15/2023    Procedure: REPAIR, TENDON, FLEXOR, LOWER EXTREMITY, SECONDARY, USING GRAFT IF INDICATED;  Surgeon: Brandon Benjamin DPM;  Location: [facility] OR;  Service: " "Podiatry;  Laterality: Left;       Review of Systems   Constitutional:  Negative for chills, fatigue and fever.   HENT:  Negative for hearing loss.    Eyes:  Negative for photophobia and visual disturbance.   Respiratory:  Negative for cough, chest tightness, shortness of breath and wheezing.    Cardiovascular:  Negative for chest pain and palpitations.   Gastrointestinal:  Negative for constipation, diarrhea, nausea and vomiting.   Endocrine: Negative for cold intolerance and heat intolerance.   Genitourinary:  Negative for flank pain.   Musculoskeletal:  Positive for gait problem. Negative for neck pain and neck stiffness.   Skin:  Positive for color change and wound.   Neurological:  Negative for light-headedness and headaches.   Psychiatric/Behavioral:  Negative for sleep disturbance.           Objective:   Ht 5' 7" (1.702 m)   Wt 111.1 kg (245 lb)   BMI 38.37 kg/m²                 Physical Exam  LOWER EXTREMITY PHYSICAL EXAMINATION    NEUROLOGY: Proprioception is intact. Sensation to light touch is intact. Rosemary-incisional sensation is decreased.    DERMATOLOGY: Skin is supple, dry and intact. Minimal delayed epidermal healing is noted. No drainage or abscess is noted. There is no localized cellulitis noted. No fluctuance.     ORTHOPEDIC: MMT is appropriate. Mild lateral ankle edema is noted. Mild pains to palpation to the areas of incision.      Assessment:     1. Postop check    2. Sprain of anterior talofibular ligament of left ankle, subsequent encounter    3. Tear of peroneal tendon, left, sequela    4. Delayed surgical wound healing, subsequent encounter    5. Pain and swelling of left ankle          Plan:     Postop check    Sprain of anterior talofibular ligament of left ankle, subsequent encounter    Tear of peroneal tendon, left, sequela    Delayed surgical wound healing, subsequent encounter  -     sulfamethoxazole-trimethoprim 800-160mg (BACTRIM DS) 800-160 mg Tab; Take 1 tablet by mouth 2 (two) " times daily. for 7 days  Dispense: 14 tablet; Refill: 0    Pain and swelling of left ankle      Thorough discussion is had with the patient today, concerning the diagnosis, its etiology, and the treatment algorithm at present.     Continue CAM Walker.    HOLD PT/OT.    Local wound care with topical Abx ointment.    Continue QD Santyl wet-2-dry.    RICE therapy.    Follow up in 10 or so days.    Refill Bactrim.           Future Appointments   Date Time Provider Department Center   4/3/2024  3:30 PM Lupis Valdivia NP [facility]   8/1/2024  2:00 PM Brenda Acosta, AMBER [facility]

## 2024-02-09 ENCOUNTER — OFFICE VISIT (OUTPATIENT)
Dept: PODIATRY | Facility: CLINIC | Age: 29
End: 2024-02-09
Payer: COMMERCIAL

## 2024-02-09 DIAGNOSIS — S86.312S TEAR OF PERONEAL TENDON, LEFT, SEQUELA: ICD-10-CM

## 2024-02-09 DIAGNOSIS — M25.572 PAIN AND SWELLING OF LEFT ANKLE: ICD-10-CM

## 2024-02-09 DIAGNOSIS — M25.472 PAIN AND SWELLING OF LEFT ANKLE: ICD-10-CM

## 2024-02-09 DIAGNOSIS — S93.492D SPRAIN OF ANTERIOR TALOFIBULAR LIGAMENT OF LEFT ANKLE, SUBSEQUENT ENCOUNTER: ICD-10-CM

## 2024-02-09 DIAGNOSIS — T81.89XD DELAYED SURGICAL WOUND HEALING, SUBSEQUENT ENCOUNTER: ICD-10-CM

## 2024-02-09 DIAGNOSIS — Z09 POSTOP CHECK: Primary | ICD-10-CM

## 2024-02-09 DIAGNOSIS — G57.92 NEURITIS OF ANKLE, LEFT: ICD-10-CM

## 2024-02-09 PROCEDURE — 1159F MED LIST DOCD IN RCRD: CPT | Mod: CPTII,S$GLB,, | Performed by: PODIATRIST

## 2024-02-09 PROCEDURE — 1160F RVW MEDS BY RX/DR IN RCRD: CPT | Mod: CPTII,S$GLB,, | Performed by: PODIATRIST

## 2024-02-09 PROCEDURE — 99024 POSTOP FOLLOW-UP VISIT: CPT | Mod: S$GLB,,, | Performed by: PODIATRIST

## 2024-02-09 PROCEDURE — 99999 PR PBB SHADOW E&M-EST. PATIENT-LVL III: CPT | Mod: PBBFAC,,, | Performed by: PODIATRIST

## 2024-02-09 RX ORDER — GABAPENTIN 600 MG/1
600 TABLET ORAL 2 TIMES DAILY
Qty: 60 TABLET | Refills: 1 | Status: SHIPPED | OUTPATIENT
Start: 2024-02-09 | End: 2024-03-10

## 2024-02-09 NOTE — PROGRESS NOTES
"Subjective:       Patient ID: Rafaela Zuniga is a 28 y.o. female.    Chief Complaint: Post-op Evaluation (Post op, rates pain 4 , Diabetic, pt wears cam boot )      HPI:  Rafaela Zuniga presents to the office today, s/p 12/15/23 LLE ATFL repair and peroneal repair. WB with CAM Walker. States mid pains and edema. States moderate neuritis. Did complete Bactrim. States completely resolved redness. States QD woundcare with Santyl. States RICE therapy.     No results found for: "HGBA1C"    Review of patient's allergies indicates:  No Known Allergies    Past Medical History:   Diagnosis Date    ADHD     Anxiety     Bipolar disorder     Depression     Idiopathic intracranial hypertension     Insomnia        Family History   Problem Relation Age of Onset    Heart disease Mother     Cancer Father     Diabetes Maternal Grandmother     Depression Maternal Grandmother     Thyroid disease Maternal Grandmother     Heart disease Maternal Grandfather     Breast cancer Neg Hx     Ovarian cancer Neg Hx     Colon cancer Neg Hx        Social History     Socioeconomic History    Marital status: Single   Tobacco Use    Smoking status: Never    Smokeless tobacco: Never   Substance and Sexual Activity    Alcohol use: Yes     Comment: Drinks socially    Drug use: Never    Sexual activity: Yes     Partners: Female, Male     Birth control/protection: I.U.D.       Past Surgical History:   Procedure Laterality Date    brain shunt  2017    eye sheath finistration Bilateral     REPAIR OF LIGAMENT OF ANKLE Left 12/15/2023    Procedure: REPAIR, LIGAMENT, ANKLE;  Surgeon: Brandon Benjamin DPM;  Location: [facility] OR;  Service: Podiatry;  Laterality: Left;    REPAIR, TENDON, FLEXOR, LOWER EXTREMITY, SECONDARY, USING GRAFT IF INDICATED Left 12/15/2023    Procedure: REPAIR, TENDON, FLEXOR, LOWER EXTREMITY, SECONDARY, USING GRAFT IF INDICATED;  Surgeon: Brandon Benjamin DPM;  Location: [facility] OR;  Service: Podiatry;  Laterality: Left;       Review of " Systems   Constitutional:  Negative for chills, fatigue and fever.   HENT:  Negative for hearing loss.    Eyes:  Negative for photophobia and visual disturbance.   Respiratory:  Negative for cough, chest tightness, shortness of breath and wheezing.    Cardiovascular:  Negative for chest pain and palpitations.   Gastrointestinal:  Negative for constipation, diarrhea, nausea and vomiting.   Endocrine: Negative for cold intolerance and heat intolerance.   Genitourinary:  Negative for flank pain.   Musculoskeletal:  Positive for gait problem. Negative for neck pain and neck stiffness.   Skin:  Positive for color change and wound.   Neurological:  Negative for light-headedness and headaches.   Psychiatric/Behavioral:  Negative for sleep disturbance.           Objective:   There were no vitals taken for this visit.                Physical Exam  LOWER EXTREMITY PHYSICAL EXAMINATION    NEUROLOGY: Proprioception is intact. Sensation to light touch is intact. Rosemary-incisional sensation is decreased.    DERMATOLOGY: Skin is supple, dry and intact. Minimal delayed epidermal healing is noted. No drainage or abscess is noted. There is no localized cellulitis noted. No fluctuance.     ORTHOPEDIC: MMT is appropriate. Mild lateral ankle edema is noted. Mild pains to palpation to the areas of incision.      Assessment:     1. Postop check    2. Sprain of anterior talofibular ligament of left ankle, subsequent encounter    3. Tear of peroneal tendon, left, sequela    4. Delayed surgical wound healing, subsequent encounter    5. Pain and swelling of left ankle    6. Neuritis of ankle, left          Plan:     Postop check    Sprain of anterior talofibular ligament of left ankle, subsequent encounter    Tear of peroneal tendon, left, sequela    Delayed surgical wound healing, subsequent encounter    Pain and swelling of left ankle  -     gabapentin (NEURONTIN) 600 MG tablet; Take 1 tablet (600 mg total) by mouth 2 (two) times daily.   Dispense: 60 tablet; Refill: 1    Neuritis of ankle, left  -     gabapentin (NEURONTIN) 600 MG tablet; Take 1 tablet (600 mg total) by mouth 2 (two) times daily.  Dispense: 60 tablet; Refill: 1      Thorough discussion is had with the patient today, concerning the diagnosis, its etiology, and the treatment algorithm at present.     D/C CAM Walker.    HOLD PT/OT for now and until wound is 100% healed (anticipate 10 or so days).    Local wound care with Hydrogel and collagen today.    Leave dressings in place until Tuesday AM.    Start BID warm water Epsom Salt soaks at that time.    Compression.    Increase Gabapentin.    Follow up in 3-6 weeks.            Future Appointments   Date Time Provider Department Center   4/3/2024  3:30 PM Lupis Valdivia NP [facility]   8/1/2024  2:00 PM Brenda Acosta, [facility]

## 2024-02-16 ENCOUNTER — PATIENT MESSAGE (OUTPATIENT)
Dept: PODIATRY | Facility: CLINIC | Age: 29
End: 2024-02-16
Payer: COMMERCIAL

## 2024-03-01 ENCOUNTER — OFFICE VISIT (OUTPATIENT)
Dept: PRIMARY CARE CLINIC | Facility: CLINIC | Age: 29
End: 2024-03-01
Payer: COMMERCIAL

## 2024-03-01 DIAGNOSIS — Z97.5 IUD (INTRAUTERINE DEVICE) IN PLACE: ICD-10-CM

## 2024-03-01 DIAGNOSIS — E66.9 CLASS 2 OBESITY WITH BODY MASS INDEX (BMI) OF 36.0 TO 36.9 IN ADULT, UNSPECIFIED OBESITY TYPE, UNSPECIFIED WHETHER SERIOUS COMORBIDITY PRESENT: ICD-10-CM

## 2024-03-01 DIAGNOSIS — G43.119 CLASSICAL MIGRAINE WITH INTRACTABLE MIGRAINE: ICD-10-CM

## 2024-03-01 DIAGNOSIS — Z83.3 FAMILY HISTORY OF DIABETES MELLITUS (DM): ICD-10-CM

## 2024-03-01 DIAGNOSIS — F41.1 GAD (GENERALIZED ANXIETY DISORDER): ICD-10-CM

## 2024-03-01 PROCEDURE — 1159F MED LIST DOCD IN RCRD: CPT | Mod: CPTII,95,, | Performed by: FAMILY MEDICINE

## 2024-03-01 PROCEDURE — 99214 OFFICE O/P EST MOD 30 MIN: CPT | Mod: 95,,, | Performed by: FAMILY MEDICINE

## 2024-03-01 PROCEDURE — 1160F RVW MEDS BY RX/DR IN RCRD: CPT | Mod: CPTII,95,, | Performed by: FAMILY MEDICINE

## 2024-03-01 RX ORDER — METFORMIN HYDROCHLORIDE 500 MG/1
TABLET, EXTENDED RELEASE ORAL
Qty: 60 TABLET | Refills: 2 | Status: SHIPPED | OUTPATIENT
Start: 2024-03-01 | End: 2024-03-28

## 2024-03-01 NOTE — PROGRESS NOTES
Subjective   LOV 12/23    The patient location is: [place]  The chief complaint leading to consultation is: follow up    Visit type: audiovisual    Face to Face time with patient: 19  25 minutes of total time spent on the encounter, which includes face to face time and non-face to face time preparing to see the patient (eg, review of tests), Obtaining and/or reviewing separately obtained history, Documenting clinical information in the electronic or other health record, Independently interpreting results (not separately reported) and communicating results to the patient/family/caregiver, or Care coordination (not separately reported).         Each patient to whom he or she provides medical services by telemedicine is:  (1) informed of the relationship between the physician and patient and the respective role of any other health care provider with respect to management of the patient; and (2) notified that he or she may decline to receive medical services by telemedicine and may withdraw from such care at any time.    Notes:    Patient ID: Rafaela Zuniga is a 28 y.o. female.    Chief Complaint: follow up    Pt had surgery /mrsa L ankle. Doing much better. About to back to gym. Boot is off.   Off pain meds; has gabapentin.     Failed toopamax.  On lamictal and wellbutrin.    Trial metformin. NO gi upset but has not had significant improvement.     Mental health has been good.     Still eating for taste to override.   Does not feel full at times.     Weight has been maintained.    HPI       Objective     PAST MEDICAL HISTORY:  Past Medical History:   Diagnosis Date    ADHD     Anxiety     Bipolar disorder     Depression     Idiopathic intracranial hypertension     Insomnia          PAST SURGICAL HISTORY:  Past Surgical History:   Procedure Laterality Date    brain shunt  2017    eye sheath finistration Bilateral     REPAIR OF LIGAMENT OF ANKLE Left 12/15/2023    Procedure: REPAIR, LIGAMENT, ANKLE;  Surgeon: Sourav  Brandon JAMA DPM;  Location: [facility] OR;  Service: Podiatry;  Laterality: Left;    REPAIR, TENDON, FLEXOR, LOWER EXTREMITY, SECONDARY, USING GRAFT IF INDICATED Left 12/15/2023    Procedure: REPAIR, TENDON, FLEXOR, LOWER EXTREMITY, SECONDARY, USING GRAFT IF INDICATED;  Surgeon: Brandon Benjamin DPM;  Location: [facility] OR;  Service: Podiatry;  Laterality: Left;       FAMILY HISTORY:  Family History   Problem Relation Age of Onset    Heart disease Mother     Cancer Father     Diabetes Maternal Grandmother     Depression Maternal Grandmother     Thyroid disease Maternal Grandmother     Heart disease Maternal Grandfather     Breast cancer Neg Hx     Ovarian cancer Neg Hx     Colon cancer Neg Hx           SOCIAL HISTORY:  Social History     Social History Narrative    Not on file       MEDICATIONS:  Medications have been reviewed.    ALLERGIES:  Allergies have been reviewed.    There were no vitals filed for this visit.  Wt Readings from Last 10 Encounters:   01/29/24 111.1 kg (245 lb)   01/18/24 111.1 kg (245 lb)   01/02/24 111.1 kg (245 lb)   12/20/23 111.1 kg (245 lb)   12/15/23 112.7 kg (248 lb 7.3 oz)   11/10/23 107 kg (236 lb)   10/30/23 107.5 kg (236 lb 15.9 oz)   08/01/23 106.9 kg (235 lb 10.8 oz)   07/18/23 105.2 kg (231 lb 15.8 oz)   05/29/23 99 kg (218 lb 4.1 oz)       Lab Results   Component Value Date    WBC 11.65 12/05/2023    HGB 12.3 12/05/2023    HCT 39.2 12/05/2023     12/05/2023    CHOL 215 (H) 08/05/2023    TRIG 67 08/05/2023    HDL 56 08/05/2023    ALT 16 08/05/2023    AST 20 08/05/2023     08/05/2023    K 4.2 08/05/2023     08/05/2023    CREATININE 0.9 08/05/2023    BUN 13 08/05/2023    CO2 24 08/05/2023    TSH 1.338 08/05/2023       Review of Systems   Constitutional:  Negative for activity change, appetite change, fatigue and fever.   HENT:  Negative for mouth dryness and goiter.    Eyes:  Negative for visual disturbance.   Respiratory:  Negative for apnea, cough, chest tightness and  shortness of breath.    Cardiovascular:  Negative for chest pain, palpitations and leg swelling.   Gastrointestinal:  Negative for abdominal pain, constipation, diarrhea, nausea, vomiting and reflux.   Endocrine: Negative for cold intolerance, heat intolerance, polydipsia, polyphagia and polyuria.   Genitourinary:  Negative for frequency and menstrual problem.   Musculoskeletal:  Negative for arthralgias and myalgias.   Integumentary:  Negative for color change and rash.   Neurological:  Negative for vertigo, tremors, seizures, syncope, weakness, light-headedness, numbness, headaches and memory loss.   Psychiatric/Behavioral:  Negative for agitation, behavioral problems, confusion, decreased concentration, hallucinations, self-injury, sleep disturbance and suicidal ideas. The patient is not nervous/anxious and is not hyperactive.        Physical Exam  Constitutional:       General: She is not in acute distress.     Appearance: Normal appearance.   HENT:      Head: Normocephalic and atraumatic.   Eyes:      General: No scleral icterus.  Pulmonary:      Effort: Pulmonary effort is normal. No respiratory distress.   Neurological:      Mental Status: She is alert and oriented to person, place, and time.   Psychiatric:         Mood and Affect: Mood normal.         Behavior: Behavior normal.              Assessment and Plan     1. Family history of diabetes mellitus (DM)    2. ISAAC (generalized anxiety disorder)    3. Classical migraine with intractable migraine    4. BMI 36.0-36.9,adult    5. Class 2 obesity with body mass index (BMI) of 36.0 to 36.9 in adult, unspecified obesity type, unspecified whether serious comorbidity present    6. IUD (intrauterine device) in place      Family history of diabetes mellitus (DM)  -     metFORMIN (GLUCOPHAGE-XR) 500 MG ER 24hr tablet; Take 1 tablet (500 mg total) by mouth once daily for 14 days, THEN 1 tablet (500 mg total) 2 (two) times daily with meals for 14 days.  Dispense: 60  tablet; Refill: 2    ISAAC (generalized anxiety disorder)  -     metFORMIN (GLUCOPHAGE-XR) 500 MG ER 24hr tablet; Take 1 tablet (500 mg total) by mouth once daily for 14 days, THEN 1 tablet (500 mg total) 2 (two) times daily with meals for 14 days.  Dispense: 60 tablet; Refill: 2    Classical migraine with intractable migraine  -     metFORMIN (GLUCOPHAGE-XR) 500 MG ER 24hr tablet; Take 1 tablet (500 mg total) by mouth once daily for 14 days, THEN 1 tablet (500 mg total) 2 (two) times daily with meals for 14 days.  Dispense: 60 tablet; Refill: 2    BMI 36.0-36.9,adult  -     metFORMIN (GLUCOPHAGE-XR) 500 MG ER 24hr tablet; Take 1 tablet (500 mg total) by mouth once daily for 14 days, THEN 1 tablet (500 mg total) 2 (two) times daily with meals for 14 days.  Dispense: 60 tablet; Refill: 2    Class 2 obesity with body mass index (BMI) of 36.0 to 36.9 in adult, unspecified obesity type, unspecified whether serious comorbidity present  -     metFORMIN (GLUCOPHAGE-XR) 500 MG ER 24hr tablet; Take 1 tablet (500 mg total) by mouth once daily for 14 days, THEN 1 tablet (500 mg total) 2 (two) times daily with meals for 14 days.  Dispense: 60 tablet; Refill: 2    IUD (intrauterine device) in place    Will speak with Dr Warner after next visit concerning naltrexone   Pt contemplative about referral nutrition        Follow up in about 4 weeks (around 3/29/2024), or if symptoms worsen or fail to improve.

## 2024-03-01 NOTE — PATIENT INSTRUCTIONS
"Wellbutrin + naltrexone= Contrave      The next component to add to the wellbutrin is the one named naltrexone. (These 2 medications together are what is in Contrave the weight loss medication). It helps prevent cravings.     You need to know you should not be on any narcotic pain medication while taking this. If you need to have a surgery or procedure you want to stop this one 7 days prior and not resume until 7 days after the last pain pill.     Watch for any nausea, constipation, fatigue, or mental health changes. Some people feel tired. Make sure you do not feel more anxious or sad.       I need to know your preferred pharmacy that is not a chain to send this in. You will need a pill cutter to quarter it up.     Please keep your follow up and send me an update.       Dr Rodriguez  [employer] for additional drug information, tools, and databases.  Copyright 5040-6324 [employer] All rights reserved.  Contributor Disclosures  (For additional information see "Naltrexone: Drug information")    You must carefully read the "Consumer Information Use and Disclaimer" below in order to understand and correctly use this information.  Brand Names:   Vivitrol    Brand Names: Sotero  APO-Naltrexone;     ReVia    What is this drug used for?  It is used to help keep you alcohol-free.  It is used to help keep you opioid-free. Opioid drugs include heroin and prescription pain drugs like oxycodone and morphine.  It may be given to you for other reasons. Talk with the doctor.    What do I need to tell my doctor BEFORE I take this drug?  If you are allergic to this drug; any part of this drug; or any other drugs, foods, or substances. Tell your doctor about the allergy and what signs you had.  If you are taking an opioid drug like morphine or oxycodone, are addicted to an opioid drug, or are having withdrawal signs.  If you have taken a pain drug within the past 7 to 14 days.  This is not a list of all drugs or health " problems that interact with this drug.  Tell your doctor and pharmacist about all of your drugs (prescription or OTC, natural products, vitamins) and health problems. You must check to make sure that it is safe for you to take this drug with all of your drugs and health problems. Do not start, stop, or change the dose of any drug without checking with your doctor.    What are some things I need to know or do while I take this drug?  All products:  Tell all of your health care providers that you take this drug. This includes your doctors, nurses, pharmacists, and dentists.  Avoid driving and doing other tasks or actions that call for you to be alert until you see how this drug affects you.  Have blood work checked as you have been told by the doctor. Talk with the doctor.  This drug may affect certain lab tests. Tell all of your health care providers and lab workers that you take this drug.  Avoid drinking alcohol while taking this drug.  Do not take opioid drugs while you are taking this drug. Opioid drugs will not work. Do not take more opioid drugs to try to get them to work. Doing this may cause severe injury, coma, or death.  A drug called naloxone can be used to help treat an opioid overdose. Your doctor may order naloxone for you to keep with you if it is needed. If you have questions about how to get or use naloxone, talk with your doctor or pharmacist. If you think there has been an opioid overdose, get medical care right away even if naloxone has been used.  If you are addicted to opioid drugs and are given this drug, you may have signs of withdrawal. If you have questions, talk with your doctor.  Tell your doctor if you are pregnant, plan on getting pregnant, or are breast-feeding. You will need to talk about the benefits and risks to you and the baby.  Tablets:  Have patient safety card with you at all times.  People taking this drug to keep an opioid-free state may get more effects from opioid drugs when  this drug is stopped. Even low amounts of opioid drugs or amounts that you have used before may lead to overdose and death. If you have questions, talk with your doctor.  Injection:  After you get a dose of this drug, its blocking effect on opioids will go away over time. Low amounts of opioid drugs or amounts that you have used before may lead to overdose and death. This effect may also be seen when it is time for your next dose of this drug, if you miss a dose, if you stop treatment, or if you have gone through treatment and are opioid-free.  Very bad skin problems have happened where the shot was given. Sometimes surgery was needed for these skin problems. Talk with the doctor.  A type of lung infection caused by an allergic reaction has happened with this drug. This may need to be treated in a hospital.    What are some side effects that I need to call my doctor about right away?  WARNING/CAUTION: Even though it may be rare, some people may have very bad and sometimes deadly side effects when taking a drug. Tell your doctor or get medical help right away if you have any of the following signs or symptoms that may be related to a very bad side effect:  All products:  Signs of an allergic reaction, like rash; hives; itching; red, swollen, blistered, or peeling skin with or without fever; wheezing; tightness in the chest or throat; trouble breathing, swallowing, or talking; unusual hoarseness; or swelling of the mouth, face, lips, tongue, or throat.  Signs of liver problems like dark urine, tiredness, decreased appetite, upset stomach or stomach pain, light-colored stools, throwing up, or yellow skin or eyes.  Signs of high blood pressure like very bad headache or dizziness, passing out, or change in eyesight.  New or worse behavior or mood changes like depression or thoughts of suicide.  Feeling confused.  Trouble breathing, slow breathing, or shallow breathing.  Sex problems in males.  Injection:  Signs of lung  or breathing problems like shortness of breath or other trouble breathing, cough, or fever.  Area that feels hard, blisters, dark scab, lumps, open wound, pain, swelling, or other very bad skin irritation where the shot was given.    What are some other side effects of this drug?  All drugs may cause side effects. However, many people have no side effects or only have minor side effects. Call your doctor or get medical help if any of these side effects or any other side effects bother you or do not go away:  All products:  Feeling nervous and excitable.  Anxiety.  Headache.  Muscle cramps.  Constipation, diarrhea, stomach pain, upset stomach, throwing up, or decreased appetite.  Unusual thirst.  Trouble sleeping.  Feeling dizzy, sleepy, tired, or weak.  Back, muscle, or joint pain.  Signs of a common cold.  Tooth pain.  Injection:  Irritation where the shot is given.  Nose or throat irritation.  Dry mouth.  These are not all of the side effects that may occur. If you have questions about side effects, call your doctor. Call your doctor for medical advice about side effects.  You may report side effects to your national health agency.    How is this drug best taken?  Use this drug as ordered by your doctor. Read all information given to you. Follow all instructions closely.  Tablets:  Keep taking this drug as you have been told by your doctor or other health care provider, even if you feel well.  Injection:  It is given as a shot into a muscle.    What do I do if I miss a dose?  Tablets:  Take a missed dose as soon as you think about it.  If it is close to the time for your next dose, skip the missed dose and go back to your normal time.  Do not take 2 doses at the same time or extra doses.  Injection:  Call your doctor to find out what to do.    How do I store and/or throw out this drug?  Tablets:  Store at room temperature in a dry place. Do not store in a bathroom.  Injection:  If you need to store this drug at  home, talk with your doctor, nurse, or pharmacist about how to store it.  All products:  Keep all drugs in a safe place. Keep all drugs out of the reach of children and pets.  Throw away unused or  drugs. Do not flush down a toilet or pour down a drain unless you are told to do so. Check with your pharmacist if you have questions about the best way to throw out drugs. There may be drug take-back programs in your area.    General drug facts  If your symptoms or health problems do not get better or if they become worse, call your doctor.  Do not share your drugs with others and do not take anyone else's drugs.  Some drugs may have another patient information leaflet. If you have any questions about this drug, please talk with your doctor, nurse, pharmacist, or other health care provider.  If you think there has been an overdose, call your poison control center or get medical care right away. Be ready to tell or show what was taken, how much, and when it happened.    Last Reviewed Date  2021  Consumer Information Use and Disclaimer  This generalized information is a limited summary of diagnosis, treatment, and/or medication information. It is not meant to be comprehensive and should be used as a tool to help the user understand and/or assess potential diagnostic and treatment options. It does NOT include all information about conditions, treatments, medications, side effects, or risks that may apply to a specific patient. It is not intended to be medical advice or a substitute for the medical advice, diagnosis, or treatment of a health care provider based on the health care provider's examination and assessment of a patient's specific and unique circumstances. Patients must speak with a health care provider for complete information about their health, medical questions, and treatment options, including any risks or benefits regarding use of medications. This information does not endorse any treatments or  medications as safe, effective, or approved for treating a specific patient. UpToDate, Inc. and its affiliates disclaim any warranty or liability relating to this information or the use thereof. The use of this information is governed by the Terms of Use, available at https://www.[employer].com/en/know/clinical-effectiveness-terms.    © 2023 Up[employer]Date, Inc. and its affiliates and/or licensors. All rights reserved.  Use of [employer] is subject to the Terms of Use.  Topic 50351 Version 148.0

## 2024-03-07 ENCOUNTER — LAB VISIT (OUTPATIENT)
Dept: LAB | Facility: HOSPITAL | Age: 29
End: 2024-03-07
Attending: FAMILY MEDICINE
Payer: COMMERCIAL

## 2024-03-07 DIAGNOSIS — R63.5 WEIGHT GAIN: ICD-10-CM

## 2024-03-07 DIAGNOSIS — Z83.3 FAMILY HISTORY OF DIABETES MELLITUS (DM): ICD-10-CM

## 2024-03-07 PROCEDURE — 83525 ASSAY OF INSULIN: CPT | Performed by: FAMILY MEDICINE

## 2024-03-07 PROCEDURE — 36415 COLL VENOUS BLD VENIPUNCTURE: CPT | Performed by: FAMILY MEDICINE

## 2024-03-07 PROCEDURE — 83036 HEMOGLOBIN GLYCOSYLATED A1C: CPT | Performed by: FAMILY MEDICINE

## 2024-03-08 LAB
ESTIMATED AVG GLUCOSE: 103 MG/DL (ref 68–131)
HBA1C MFR BLD: 5.2 % (ref 4–5.6)
INSULIN COLLECTION INTERVAL: ABNORMAL
INSULIN SERPL-ACNC: 54.3 UU/ML

## 2024-03-18 ENCOUNTER — OFFICE VISIT (OUTPATIENT)
Dept: PRIMARY CARE CLINIC | Facility: CLINIC | Age: 29
End: 2024-03-18
Payer: COMMERCIAL

## 2024-03-18 VITALS
WEIGHT: 262.38 LBS | RESPIRATION RATE: 18 BRPM | HEIGHT: 67 IN | BODY MASS INDEX: 41.18 KG/M2 | SYSTOLIC BLOOD PRESSURE: 130 MMHG | DIASTOLIC BLOOD PRESSURE: 78 MMHG | TEMPERATURE: 98 F

## 2024-03-18 DIAGNOSIS — Z97.5 IUD (INTRAUTERINE DEVICE) IN PLACE: ICD-10-CM

## 2024-03-18 DIAGNOSIS — F31.78 BIPOLAR DISORDER, IN FULL REMISSION, MOST RECENT EPISODE MIXED: ICD-10-CM

## 2024-03-18 DIAGNOSIS — G93.2 IDIOPATHIC INTRACRANIAL HYPERTENSION: ICD-10-CM

## 2024-03-18 DIAGNOSIS — E66.01 CLASS 3 SEVERE OBESITY WITH SERIOUS COMORBIDITY AND BODY MASS INDEX (BMI) OF 40.0 TO 44.9 IN ADULT, UNSPECIFIED OBESITY TYPE: ICD-10-CM

## 2024-03-18 DIAGNOSIS — G43.119 CLASSICAL MIGRAINE WITH INTRACTABLE MIGRAINE: ICD-10-CM

## 2024-03-18 DIAGNOSIS — F41.1 GAD (GENERALIZED ANXIETY DISORDER): ICD-10-CM

## 2024-03-18 DIAGNOSIS — F90.9 ATTENTION DEFICIT HYPERACTIVITY DISORDER (ADHD), UNSPECIFIED ADHD TYPE: ICD-10-CM

## 2024-03-18 PROCEDURE — 3008F BODY MASS INDEX DOCD: CPT | Mod: CPTII,S$GLB,, | Performed by: FAMILY MEDICINE

## 2024-03-18 PROCEDURE — 3078F DIAST BP <80 MM HG: CPT | Mod: CPTII,S$GLB,, | Performed by: FAMILY MEDICINE

## 2024-03-18 PROCEDURE — 3044F HG A1C LEVEL LT 7.0%: CPT | Mod: CPTII,S$GLB,, | Performed by: FAMILY MEDICINE

## 2024-03-18 PROCEDURE — 1159F MED LIST DOCD IN RCRD: CPT | Mod: CPTII,S$GLB,, | Performed by: FAMILY MEDICINE

## 2024-03-18 PROCEDURE — 1160F RVW MEDS BY RX/DR IN RCRD: CPT | Mod: CPTII,S$GLB,, | Performed by: FAMILY MEDICINE

## 2024-03-18 PROCEDURE — 99999 PR PBB SHADOW E&M-EST. PATIENT-LVL V: CPT | Mod: PBBFAC,,, | Performed by: FAMILY MEDICINE

## 2024-03-18 PROCEDURE — 99214 OFFICE O/P EST MOD 30 MIN: CPT | Mod: S$GLB,,, | Performed by: FAMILY MEDICINE

## 2024-03-18 PROCEDURE — 3075F SYST BP GE 130 - 139MM HG: CPT | Mod: CPTII,S$GLB,, | Performed by: FAMILY MEDICINE

## 2024-03-18 NOTE — PROGRESS NOTES
Subjective     Patient ID: Rafaela Zuniga is a 28 y.o. female.    Chief Complaint: follow up  LOV 3/24 virtual      On Adderall, Wellbutrin, lamictal.  Failed topamax.  Tried phentermine in the past and had modest success.     Glp1 cost prohibitive.   Pt contemplative toward bariatric surgery.    Started metformin. Pt has not noticed a good bit of difference with her metformin. Pt is agreeable to considering naltrexone.   She is aware of  not being on pain meds.     Tends to be a stress/emotional eater. Adderall helps some.   No narcotic pain meds; off due to this.             HPI       Objective     PAST MEDICAL HISTORY:  Past Medical History:   Diagnosis Date    ADHD     Anxiety     Bipolar disorder     Depression     Idiopathic intracranial hypertension     Insomnia          PAST SURGICAL HISTORY:  Past Surgical History:   Procedure Laterality Date    brain shunt  2017    eye sheath finistration Bilateral     REPAIR OF LIGAMENT OF ANKLE Left 12/15/2023    Procedure: REPAIR, LIGAMENT, ANKLE;  Surgeon: Brandon Benjamin DPM;  Location: [facility] OR;  Service: Podiatry;  Laterality: Left;    REPAIR, TENDON, FLEXOR, LOWER EXTREMITY, SECONDARY, USING GRAFT IF INDICATED Left 12/15/2023    Procedure: REPAIR, TENDON, FLEXOR, LOWER EXTREMITY, SECONDARY, USING GRAFT IF INDICATED;  Surgeon: Brandon Benjamin DPM;  Location: [facility] OR;  Service: Podiatry;  Laterality: Left;       FAMILY HISTORY:  Family History   Problem Relation Age of Onset    Heart disease Mother     Cancer Father     Diabetes Maternal Grandmother     Depression Maternal Grandmother     Thyroid disease Maternal Grandmother     Heart disease Maternal Grandfather     Breast cancer Neg Hx     Ovarian cancer Neg Hx     Colon cancer Neg Hx           SOCIAL HISTORY:  Social History     Social History Narrative    Not on file       MEDICATIONS:  Medications have been reviewed.    ALLERGIES:  Allergies have been reviewed.    Vitals:    03/18/24 1338   BP: 130/78    Resp: 18   Temp: 97.7 °F (36.5 °C)     Wt Readings from Last 10 Encounters:   03/18/24 119 kg (262 lb 5.6 oz)   01/29/24 111.1 kg (245 lb)   01/18/24 111.1 kg (245 lb)   01/02/24 111.1 kg (245 lb)   12/20/23 111.1 kg (245 lb)   12/15/23 112.7 kg (248 lb 7.3 oz)   11/10/23 107 kg (236 lb)   10/30/23 107.5 kg (236 lb 15.9 oz)   08/01/23 106.9 kg (235 lb 10.8 oz)   07/18/23 105.2 kg (231 lb 15.8 oz)       Lab Results   Component Value Date    WBC 11.65 12/05/2023    HGB 12.3 12/05/2023    HCT 39.2 12/05/2023     12/05/2023    CHOL 215 (H) 08/05/2023    TRIG 67 08/05/2023    HDL 56 08/05/2023    ALT 16 08/05/2023    AST 20 08/05/2023     08/05/2023    K 4.2 08/05/2023     08/05/2023    CREATININE 0.9 08/05/2023    BUN 13 08/05/2023    CO2 24 08/05/2023    TSH 1.338 08/05/2023    HGBA1C 5.2 03/07/2024       Review of Systems   Constitutional:  Negative for activity change, appetite change, fatigue and fever.   HENT:  Negative for mouth dryness and goiter.    Eyes:  Negative for visual disturbance.   Respiratory:  Negative for apnea, cough, chest tightness and shortness of breath.    Cardiovascular:  Negative for chest pain, palpitations and leg swelling.   Gastrointestinal:  Negative for abdominal pain, constipation and diarrhea.   Endocrine: Negative for cold intolerance, heat intolerance, polydipsia, polyphagia and polyuria.   Genitourinary:  Negative for frequency and menstrual problem.   Musculoskeletal:  Negative for arthralgias and myalgias.   Integumentary:  Negative for color change and rash.   Neurological:  Negative for dizziness, vertigo, tremors, seizures, syncope, facial asymmetry, speech difficulty, weakness, light-headedness, numbness, headaches and memory loss.   Psychiatric/Behavioral:  Negative for confusion, decreased concentration, dysphoric mood, hallucinations, self-injury, sleep disturbance and suicidal ideas. The patient is not nervous/anxious and is not hyperactive.         Physical Exam  Vitals and nursing note reviewed.   Constitutional:       General: She is not in acute distress.  HENT:      Head: Normocephalic and atraumatic.      Mouth/Throat:      Pharynx: Oropharynx is clear.   Eyes:      General: No scleral icterus.     Pupils: Pupils are equal, round, and reactive to light.   Neck:      Comments: No TM  Cardiovascular:      Rate and Rhythm: Normal rate and regular rhythm.      Pulses: Normal pulses.      Heart sounds: Normal heart sounds. No murmur heard.     No friction rub. No gallop.   Pulmonary:      Effort: Pulmonary effort is normal. No respiratory distress.      Breath sounds: Normal breath sounds. No wheezing, rhonchi or rales.   Abdominal:      General: Bowel sounds are normal. There is no distension.      Palpations: Abdomen is soft.      Tenderness: There is no abdominal tenderness.   Musculoskeletal:         General: No swelling.      Cervical back: Normal range of motion and neck supple. No tenderness.   Lymphadenopathy:      Cervical: No cervical adenopathy.   Skin:     General: Skin is warm.      Findings: No erythema or rash.   Neurological:      Mental Status: She is alert and oriented to person, place, and time.   Psychiatric:         Mood and Affect: Mood normal.         Behavior: Behavior normal.              Assessment and Plan   1. Idiopathic intracranial hypertension  Comments:  has shunt; cont care  Overview:  Previously followed by neurosurgery in [place], has  shunt      2. Classical migraine with intractable migraine    3. Bipolar disorder, in full remission, most recent episode mixed  Overview:  Previously followed by Psychiatry      4. Attention deficit hyperactivity disorder (ADHD), unspecified ADHD type    5. ISAAC (generalized anxiety disorder)    6. IUD (intrauterine device) in place    7. Class 3 severe obesity with serious comorbidity and body mass index (BMI) of 40.0 to 44.9 in adult, unspecified obesity type        ICD-10-CM ICD-9-CM   1.  Idiopathic intracranial hypertension  G93.2 348.2   2. Classical migraine with intractable migraine  G43.119 346.01   3. Bipolar disorder, in full remission, most recent episode mixed  F31.78 296.66   4. Attention deficit hyperactivity disorder (ADHD), unspecified ADHD type  F90.9 314.01   5. ISAAC (generalized anxiety disorder)  F41.1 300.02   6. IUD (intrauterine device) in place  Z97.5 V45.51   7. Class 3 severe obesity with serious comorbidity and body mass index (BMI) of 40.0 to 44.9 in adult, unspecified obesity type  E66.01 278.01    Z68.41 V85.41        Idiopathic intracranial hypertension  Comments:  has shunt; cont care    Classical migraine with intractable migraine    Bipolar disorder, in full remission, most recent episode mixed    Attention deficit hyperactivity disorder (ADHD), unspecified ADHD type    ISAAC (generalized anxiety disorder)    IUD (intrauterine device) in place    Class 3 severe obesity with serious comorbidity and body mass index (BMI) of 40.0 to 44.9 in adult, unspecified obesity type  Referral bariatric surgery       Stop metformin   Reviewed naltrexone at [facility] may consider  Note sent to Dr Warner; dw her no direct CI but I do have concerns about any fatigue; ability to help with sustained weight loss    Chronic conditions stable   Follow up in about 6 weeks (around 4/29/2024), or if symptoms worsen or fail to improve.

## 2024-03-18 NOTE — PATIENT INSTRUCTIONS
"Please let me know how you are  doing.     The next component to add to the wellbutrin is the one named naltrexone. (These 2 medications together are what is in Contrave the weight loss medication). It helps prevent cravings.     You need to know you should not be on any narcotic pain medication while taking this. If you need to have a surgery or procedure you want to stop this one 7 days prior and not resume until 7 days after the last pain pill.     Watch for any nausea, constipation, fatigue, or mental health changes. Some people feel tired. Make sure you do not feel more anxious or sad.       I need to know your preferred pharmacy that is not a chain to send this in. You will need a pill cutter to quarter it up.     Please keep your follow up and send me an update.       Dr Rodriguez  Access [employer] for additional drug information, tools, and databases.  Copyright 8751-4657 [employer] All rights reserved.  Contributor Disclosures  (For additional information see "Naltrexone: Drug information")    You must carefully read the "Consumer Information Use and Disclaimer" below in order to understand and correctly use this information.  Brand Names:   Vivitrol    Brand Names: Sotero  APO-Naltrexone;     ReVia    What is this drug used for?  It is used to help keep you alcohol-free.  It is used to help keep you opioid-free. Opioid drugs include heroin and prescription pain drugs like oxycodone and morphine.  It may be given to you for other reasons. Talk with the doctor.    What do I need to tell my doctor BEFORE I take this drug?  If you are allergic to this drug; any part of this drug; or any other drugs, foods, or substances. Tell your doctor about the allergy and what signs you had.  If you are taking an opioid drug like morphine or oxycodone, are addicted to an opioid drug, or are having withdrawal signs.  If you have taken a pain drug within the past 7 to 14 days.  This is not a list of all drugs or " health problems that interact with this drug.  Tell your doctor and pharmacist about all of your drugs (prescription or OTC, natural products, vitamins) and health problems. You must check to make sure that it is safe for you to take this drug with all of your drugs and health problems. Do not start, stop, or change the dose of any drug without checking with your doctor.    What are some things I need to know or do while I take this drug?  All products:  Tell all of your health care providers that you take this drug. This includes your doctors, nurses, pharmacists, and dentists.  Avoid driving and doing other tasks or actions that call for you to be alert until you see how this drug affects you.  Have blood work checked as you have been told by the doctor. Talk with the doctor.  This drug may affect certain lab tests. Tell all of your health care providers and lab workers that you take this drug.  Avoid drinking alcohol while taking this drug.  Do not take opioid drugs while you are taking this drug. Opioid drugs will not work. Do not take more opioid drugs to try to get them to work. Doing this may cause severe injury, coma, or death.  A drug called naloxone can be used to help treat an opioid overdose. Your doctor may order naloxone for you to keep with you if it is needed. If you have questions about how to get or use naloxone, talk with your doctor or pharmacist. If you think there has been an opioid overdose, get medical care right away even if naloxone has been used.  If you are addicted to opioid drugs and are given this drug, you may have signs of withdrawal. If you have questions, talk with your doctor.  Tell your doctor if you are pregnant, plan on getting pregnant, or are breast-feeding. You will need to talk about the benefits and risks to you and the baby.  Tablets:  Have patient safety card with you at all times.  People taking this drug to keep an opioid-free state may get more effects from opioid  drugs when this drug is stopped. Even low amounts of opioid drugs or amounts that you have used before may lead to overdose and death. If you have questions, talk with your doctor.  Injection:  After you get a dose of this drug, its blocking effect on opioids will go away over time. Low amounts of opioid drugs or amounts that you have used before may lead to overdose and death. This effect may also be seen when it is time for your next dose of this drug, if you miss a dose, if you stop treatment, or if you have gone through treatment and are opioid-free.  Very bad skin problems have happened where the shot was given. Sometimes surgery was needed for these skin problems. Talk with the doctor.  A type of lung infection caused by an allergic reaction has happened with this drug. This may need to be treated in a hospital.    What are some side effects that I need to call my doctor about right away?  WARNING/CAUTION: Even though it may be rare, some people may have very bad and sometimes deadly side effects when taking a drug. Tell your doctor or get medical help right away if you have any of the following signs or symptoms that may be related to a very bad side effect:  All products:  Signs of an allergic reaction, like rash; hives; itching; red, swollen, blistered, or peeling skin with or without fever; wheezing; tightness in the chest or throat; trouble breathing, swallowing, or talking; unusual hoarseness; or swelling of the mouth, face, lips, tongue, or throat.  Signs of liver problems like dark urine, tiredness, decreased appetite, upset stomach or stomach pain, light-colored stools, throwing up, or yellow skin or eyes.  Signs of high blood pressure like very bad headache or dizziness, passing out, or change in eyesight.  New or worse behavior or mood changes like depression or thoughts of suicide.  Feeling confused.  Trouble breathing, slow breathing, or shallow breathing.  Sex problems in  males.  Injection:  Signs of lung or breathing problems like shortness of breath or other trouble breathing, cough, or fever.  Area that feels hard, blisters, dark scab, lumps, open wound, pain, swelling, or other very bad skin irritation where the shot was given.    What are some other side effects of this drug?  All drugs may cause side effects. However, many people have no side effects or only have minor side effects. Call your doctor or get medical help if any of these side effects or any other side effects bother you or do not go away:  All products:  Feeling nervous and excitable.  Anxiety.  Headache.  Muscle cramps.  Constipation, diarrhea, stomach pain, upset stomach, throwing up, or decreased appetite.  Unusual thirst.  Trouble sleeping.  Feeling dizzy, sleepy, tired, or weak.  Back, muscle, or joint pain.  Signs of a common cold.  Tooth pain.  Injection:  Irritation where the shot is given.  Nose or throat irritation.  Dry mouth.  These are not all of the side effects that may occur. If you have questions about side effects, call your doctor. Call your doctor for medical advice about side effects.  You may report side effects to your national health agency.    How is this drug best taken?  Use this drug as ordered by your doctor. Read all information given to you. Follow all instructions closely.  Tablets:  Keep taking this drug as you have been told by your doctor or other health care provider, even if you feel well.  Injection:  It is given as a shot into a muscle.    What do I do if I miss a dose?  Tablets:  Take a missed dose as soon as you think about it.  If it is close to the time for your next dose, skip the missed dose and go back to your normal time.  Do not take 2 doses at the same time or extra doses.  Injection:  Call your doctor to find out what to do.    How do I store and/or throw out this drug?  Tablets:  Store at room temperature in a dry place. Do not store in a bathroom.  Injection:  If  you need to store this drug at home, talk with your doctor, nurse, or pharmacist about how to store it.  All products:  Keep all drugs in a safe place. Keep all drugs out of the reach of children and pets.  Throw away unused or  drugs. Do not flush down a toilet or pour down a drain unless you are told to do so. Check with your pharmacist if you have questions about the best way to throw out drugs. There may be drug take-back programs in your area.    General drug facts  If your symptoms or health problems do not get better or if they become worse, call your doctor.  Do not share your drugs with others and do not take anyone else's drugs.  Some drugs may have another patient information leaflet. If you have any questions about this drug, please talk with your doctor, nurse, pharmacist, or other health care provider.  If you think there has been an overdose, call your poison control center or get medical care right away. Be ready to tell or show what was taken, how much, and when it happened.    Last Reviewed Date  2021  Consumer Information Use and Disclaimer  This generalized information is a limited summary of diagnosis, treatment, and/or medication information. It is not meant to be comprehensive and should be used as a tool to help the user understand and/or assess potential diagnostic and treatment options. It does NOT include all information about conditions, treatments, medications, side effects, or risks that may apply to a specific patient. It is not intended to be medical advice or a substitute for the medical advice, diagnosis, or treatment of a health care provider based on the health care provider's examination and assessment of a patient's specific and unique circumstances. Patients must speak with a health care provider for complete information about their health, medical questions, and treatment options, including any risks or benefits regarding use of medications. This information does  not endorse any treatments or medications as safe, effective, or approved for treating a specific patient. UpToDate, Inc. and its affiliates disclaim any warranty or liability relating to this information or the use thereof. The use of this information is governed by the Terms of Use, available at https://www.[employer].com/en/know/clinical-effectiveness-terms.    © 2023 UpToDate, Inc. and its affiliates and/or licensors. All rights reserved.  Use of [employer] is subject to the Terms of Use.  Topic 64464 Version 148.0

## 2024-03-20 ENCOUNTER — TELEPHONE (OUTPATIENT)
Dept: PRIMARY CARE CLINIC | Facility: CLINIC | Age: 29
End: 2024-03-20
Payer: COMMERCIAL

## 2024-03-20 ENCOUNTER — PATIENT MESSAGE (OUTPATIENT)
Dept: PRIMARY CARE CLINIC | Facility: CLINIC | Age: 29
End: 2024-03-20
Payer: COMMERCIAL

## 2024-03-20 DIAGNOSIS — F31.78 BIPOLAR DISORDER, IN FULL REMISSION, MOST RECENT EPISODE MIXED: ICD-10-CM

## 2024-03-20 DIAGNOSIS — E66.01 CLASS 3 SEVERE OBESITY WITH SERIOUS COMORBIDITY AND BODY MASS INDEX (BMI) OF 40.0 TO 44.9 IN ADULT, UNSPECIFIED OBESITY TYPE: ICD-10-CM

## 2024-03-20 DIAGNOSIS — G93.2 IDIOPATHIC INTRACRANIAL HYPERTENSION: Primary | ICD-10-CM

## 2024-03-20 NOTE — TELEPHONE ENCOUNTER
Called pt at listed number:   On abilify, lamictal, buspar, adderall, metformin, nurtec prn  Has had trazodone.   Will message pt may consider but I have concerns (with chart review) about adding another centrally acting agent.   rdf        ----- Message from Jessica Warner, PhD sent at 3/19/2024  8:40 AM CDT -----  I am fine with this, Dr. Rodriguez. Thank you so much for letting me know. Have a wonderful day! -Jessica  ----- Message -----  From: Divina Rodriguez MD  Sent: 3/18/2024   1:50 PM CDT  To: Jessica Warner, PhD    Meeting with Rafaela--    Finished with healing from ankle surgery; she is stable overall from a MH standpoint (self reported).   Do you have any objections to adding a low dose of naltrexone?   Some impulse control/pleasure eating, already on wellbutrin.    Dr Rodriguez

## 2024-03-26 ENCOUNTER — PATIENT MESSAGE (OUTPATIENT)
Dept: PODIATRY | Facility: CLINIC | Age: 29
End: 2024-03-26
Payer: COMMERCIAL

## 2024-03-26 DIAGNOSIS — Z09 POSTOP CHECK: Primary | ICD-10-CM

## 2024-03-26 DIAGNOSIS — Z98.890 HISTORY OF ANKLE SURGERY: ICD-10-CM

## 2024-03-26 DIAGNOSIS — S93.492D SPRAIN OF ANTERIOR TALOFIBULAR LIGAMENT OF LEFT ANKLE, SUBSEQUENT ENCOUNTER: ICD-10-CM

## 2024-03-26 DIAGNOSIS — S86.312S TEAR OF PERONEAL TENDON, LEFT, SEQUELA: ICD-10-CM

## 2024-03-27 ENCOUNTER — TELEPHONE (OUTPATIENT)
Dept: NEUROLOGY | Facility: CLINIC | Age: 29
End: 2024-03-27
Payer: COMMERCIAL

## 2024-03-27 ENCOUNTER — HOSPITAL ENCOUNTER (OUTPATIENT)
Dept: RADIOLOGY | Facility: HOSPITAL | Age: 29
Discharge: HOME | End: 2024-03-27
Attending: PODIATRIST
Payer: COMMERCIAL

## 2024-03-27 DIAGNOSIS — F90.2 ATTENTION DEFICIT HYPERACTIVITY DISORDER (ADHD), COMBINED TYPE: ICD-10-CM

## 2024-03-27 DIAGNOSIS — Z98.890 HISTORY OF ANKLE SURGERY: ICD-10-CM

## 2024-03-27 DIAGNOSIS — S93.492D SPRAIN OF ANTERIOR TALOFIBULAR LIGAMENT OF LEFT ANKLE, SUBSEQUENT ENCOUNTER: ICD-10-CM

## 2024-03-27 DIAGNOSIS — S86.312S TEAR OF PERONEAL TENDON, LEFT, SEQUELA: ICD-10-CM

## 2024-03-27 PROCEDURE — 73610 X-RAY EXAM OF ANKLE: CPT | Mod: TC,LT

## 2024-03-27 PROCEDURE — 73610 X-RAY EXAM OF ANKLE: CPT | Mod: 26,LT,, | Performed by: RADIOLOGY

## 2024-03-27 RX ORDER — DEXTROAMPHETAMINE SACCHARATE, AMPHETAMINE ASPARTATE MONOHYDRATE, DEXTROAMPHETAMINE SULFATE AND AMPHETAMINE SULFATE 7.5; 7.5; 7.5; 7.5 MG/1; MG/1; MG/1; MG/1
30 CAPSULE, EXTENDED RELEASE ORAL EVERY MORNING
Qty: 30 CAPSULE | Refills: 0 | Status: SHIPPED | OUTPATIENT
Start: 2024-03-27 | End: 2024-06-10

## 2024-03-27 NOTE — TELEPHONE ENCOUNTER
----- Message from Lali Orozco sent at 3/27/2024  2:59 PM CDT -----  Regarding: pt callback  Name of Who is Calling:Pt         What is the request in detail: Pt requesting to have virtual appt for upcoming appt due to being out of town.   Please advise and contact           Can the clinic reply by [facility]: yes         What Number to Call Back [facility]:Telephone Information:  Mobile          [phone]

## 2024-06-06 ENCOUNTER — TELEPHONE (OUTPATIENT)
Dept: PSYCHIATRY | Facility: CLINIC | Age: 29
End: 2024-06-06
Payer: COMMERCIAL

## 2024-06-10 ENCOUNTER — OFFICE VISIT (OUTPATIENT)
Dept: PSYCHIATRY | Facility: CLINIC | Age: 29
End: 2024-06-10
Payer: COMMERCIAL

## 2024-06-10 VITALS — SYSTOLIC BLOOD PRESSURE: 109 MMHG | DIASTOLIC BLOOD PRESSURE: 72 MMHG | HEART RATE: 82 BPM

## 2024-06-10 DIAGNOSIS — Z91.52 PERSONAL HISTORY OF NONSUICIDAL SELF-INJURY: ICD-10-CM

## 2024-06-10 DIAGNOSIS — F31.78 BIPOLAR DISORDER, IN FULL REMISSION, MOST RECENT EPISODE MIXED: ICD-10-CM

## 2024-06-10 DIAGNOSIS — F90.2 ATTENTION DEFICIT HYPERACTIVITY DISORDER (ADHD), COMBINED TYPE: Primary | ICD-10-CM

## 2024-06-10 DIAGNOSIS — F41.1 GENERALIZED ANXIETY DISORDER WITH PANIC ATTACKS: ICD-10-CM

## 2024-06-10 DIAGNOSIS — F41.0 GENERALIZED ANXIETY DISORDER WITH PANIC ATTACKS: ICD-10-CM

## 2024-06-10 DIAGNOSIS — F31.32 BIPOLAR 1 DISORDER, DEPRESSED, MODERATE: ICD-10-CM

## 2024-06-10 DIAGNOSIS — F50.9 EATING DISORDER, UNSPECIFIED TYPE: ICD-10-CM

## 2024-06-10 DIAGNOSIS — Z87.828 HISTORY OF TRAUMA: ICD-10-CM

## 2024-06-10 DIAGNOSIS — F51.05 INSOMNIA DUE TO MENTAL CONDITION: ICD-10-CM

## 2024-06-10 PROCEDURE — 99999 PR PBB SHADOW E&M-EST. PATIENT-LVL II: CPT | Mod: PBBFAC,,, | Performed by: PSYCHOLOGIST

## 2024-06-10 PROCEDURE — 99214 OFFICE O/P EST MOD 30 MIN: CPT | Mod: S$GLB,,, | Performed by: PSYCHOLOGIST

## 2024-06-10 PROCEDURE — 1159F MED LIST DOCD IN RCRD: CPT | Mod: CPTII,S$GLB,, | Performed by: PSYCHOLOGIST

## 2024-06-10 PROCEDURE — 3044F HG A1C LEVEL LT 7.0%: CPT | Mod: CPTII,S$GLB,, | Performed by: PSYCHOLOGIST

## 2024-06-10 PROCEDURE — 3074F SYST BP LT 130 MM HG: CPT | Mod: CPTII,S$GLB,, | Performed by: PSYCHOLOGIST

## 2024-06-10 PROCEDURE — 3078F DIAST BP <80 MM HG: CPT | Mod: CPTII,S$GLB,, | Performed by: PSYCHOLOGIST

## 2024-06-10 RX ORDER — DEXTROAMPHETAMINE SACCHARATE, AMPHETAMINE ASPARTATE MONOHYDRATE, DEXTROAMPHETAMINE SULFATE AND AMPHETAMINE SULFATE 7.5; 7.5; 7.5; 7.5 MG/1; MG/1; MG/1; MG/1
30 CAPSULE, EXTENDED RELEASE ORAL EVERY MORNING
Qty: 30 CAPSULE | Refills: 0 | Status: SHIPPED | OUTPATIENT
Start: 2024-06-10

## 2024-06-10 RX ORDER — LAMOTRIGINE 100 MG/1
100 TABLET ORAL DAILY
Qty: 90 TABLET | Refills: 0 | Status: SHIPPED | OUTPATIENT
Start: 2024-06-10 | End: 2024-09-08

## 2024-06-10 RX ORDER — ARIPIPRAZOLE 10 MG/1
10 TABLET ORAL DAILY
Qty: 90 TABLET | Refills: 0 | Status: SHIPPED | OUTPATIENT
Start: 2024-06-10 | End: 2024-09-08

## 2024-06-10 RX ORDER — DEXTROAMPHETAMINE SACCHARATE, AMPHETAMINE ASPARTATE MONOHYDRATE, DEXTROAMPHETAMINE SULFATE AND AMPHETAMINE SULFATE 7.5; 7.5; 7.5; 7.5 MG/1; MG/1; MG/1; MG/1
30 CAPSULE, EXTENDED RELEASE ORAL EVERY MORNING
Qty: 30 CAPSULE | Refills: 0 | Status: SHIPPED | OUTPATIENT
Start: 2024-07-08

## 2024-06-10 RX ORDER — BUSPIRONE HYDROCHLORIDE 15 MG/1
15 TABLET ORAL 2 TIMES DAILY
Qty: 180 TABLET | Refills: 0 | Status: SHIPPED | OUTPATIENT
Start: 2024-06-10 | End: 2024-09-08

## 2024-06-10 RX ORDER — TRAZODONE HYDROCHLORIDE 100 MG/1
100 TABLET ORAL NIGHTLY PRN
Qty: 90 TABLET | Refills: 0 | Status: SHIPPED | OUTPATIENT
Start: 2024-06-10 | End: 2024-09-08

## 2024-06-10 RX ORDER — BUPROPION HYDROCHLORIDE 300 MG/1
300 TABLET ORAL DAILY
Qty: 90 TABLET | Refills: 0 | Status: SHIPPED | OUTPATIENT
Start: 2024-06-10 | End: 2024-09-08

## 2024-06-10 RX ORDER — DIAZEPAM 5 MG/1
5 TABLET ORAL 2 TIMES DAILY PRN
Qty: 60 TABLET | Refills: 2 | Status: SHIPPED | OUTPATIENT
Start: 2024-06-10 | End: 2024-09-08

## 2024-06-10 RX ORDER — DEXTROAMPHETAMINE SACCHARATE, AMPHETAMINE ASPARTATE MONOHYDRATE, DEXTROAMPHETAMINE SULFATE AND AMPHETAMINE SULFATE 7.5; 7.5; 7.5; 7.5 MG/1; MG/1; MG/1; MG/1
30 CAPSULE, EXTENDED RELEASE ORAL EVERY MORNING
Qty: 30 CAPSULE | Refills: 0 | Status: SHIPPED | OUTPATIENT
Start: 2024-08-06

## 2024-06-10 NOTE — PROGRESS NOTES
MEDICATION MANAGEMENT SESSION    Name: Rafaela Zuniga  Age: 28 y.o.  : 1995    Preferred Name: Rafaela    Referring provider: Brenda Acosta PA-C    Reason for Visit:  Medication follow up    Summar of Visit:  Pt reports her mood has been stable and mostly positive since last appointment. She does not report depression or manic/hypomanic symptoms. She's faced multiple stressors, including losing her father several months ago. She reports she's coping well. Her anxiety level has fluctuated; she has not had any panic attacks. Adderall XR 30 mg qd works for her ADHD symptoms and no side effects. Pt had ankle/foot surgery, recovered 2 weeks in [facility] with her family (driven there by a friend) and is now in PT. She's completing schoolwork to graduate in August. She plans to then apply to graduate school.    Current Symptoms:   ADHD: Symptoms managed Adderall XR 30 mg    Depressive Disorder: tired/fatigued   Anxiety Disorder: anxiety/nervousness, fatigue, excessive worry   Panic Disorder: denied   Manic Disorder: denied   Psychotic Disorder: denied   Substance Use:  Alcohol: Socially once per month at most, 1-2 glasses of wine.          10/12/2023     2:03 PM   Adult ADHD Self-Report Scale   How often do you have trouble wrapping up the final details of a project once the chanllenging parts have been done? 3   How often do you have difficulty getting things in order when you have to do a task that requires organization? 4   How often do you have problems remembering appointments or obligations? 3   When you have a task that requires a lot of thought, how often do you avoid or delay getting started? 4   How often do you fidget or squirm with your hands or feet when you have to sit down for a long time? 4   How often do you feel overly active and compelled to do things, like you were driven by a motor? 2   Part A Score 20   How often do you make careless mistakes when you have to work on a boring or difficult  "project? 3   How often do you have difficulty keeping your attention when you are doing boring or repetitive work? 4   How often do you have difficulty concentrating on what people say to you, even when they are speaking to you directly? 2   How often do you misplace or have difficulty finding things at home or at work? 3   How often are you distracted by activity or noise around you? 4   How often do you leave your seat in meetings or other situations in which you are expected to remain seated? 2   How often do you feel restless or fidgety? 4   How often do you have difficulty unwinding and relaxing when you have time to yourself? 3   How often do you find yourself talking too much when you are in social situations? 4   When you're in a conversation, how often do you find yourself finishing the sentences of the people you are talking to before they can finish them themselves? 3   How often do you have difficulty waiting your turn in situations when turn taking is required? 2   How often do you interrupt others when they are busy? 3   Part B Score 37     Review of Systems   Constitutional:  Positive for fatigue. Negative for activity change, appetite change and unexpected weight change.   Respiratory:  Negative for shortness of breath.    Psychiatric/Behavioral:  Positive for decreased concentration and sleep disturbance. Negative for agitation, behavioral problems, confusion, dysphoric mood, hallucinations, self-injury and suicidal ideas. The patient is nervous/anxious. The patient is not hyperactive.         Constitutional:  Vitals:  Most recent vital signs were reviewed.   Last 3 sets of Vitals        2/9/2024     3:10 PM 3/18/2024     1:38 PM 6/10/2024     2:52 PM   Vitals - 1 value per visit   SYSTOLIC  130 109   DIASTOLIC  78 72   Pulse   82   Temp  97.7 °F (36.5 °C)    Resp  18    Weight (lb)  262.35    Weight (kg)  119    Height  5' 7" (1.702 m)    BMI (Calculated)  41.1    Pain Score Four Zero         " "  Psychiatric:  Oriented: x 3   Attitude: cooperative   Eye Contact: good   Behavior: wnl   Mood: "good"  Affect: appropriate range   Attention: intact   Concentration: grossly intact   Thought Process: goal directed   Speech: intelligible  Volume: WNL   Quantity: WNL   Rhythm: WNL  Insight: fair to good   Threats: no SI / HI   Memory: Grossly intact  Psychosis: denies all   Estimate of Intellectual Function: average   Judgment: fair to good  Relevant Elements of Neurological Exam: normal gait     Allergy Review:   Review of patient's allergies indicates:  No Known Allergies     Medical Problem List:   Patient Active Problem List   Diagnosis    Bipolar disorder, in full remission, most recent episode mixed    Idiopathic intracranial hypertension    Presence of ventricular shunt    IUD (intrauterine device) in place    BMI 36.0-36.9,adult    ISAAC (generalized anxiety disorder)    Classical migraine with intractable migraine    Chronic mixed headache syndrome    Left ankle sprain    Insomnia    ADHD      Encounter Diagnoses   Name Primary?    Attention deficit hyperactivity disorder (ADHD), combined type Yes    Personal history of nonsuicidal self-injury     Generalized anxiety disorder with panic attacks     Bipolar disorder, in full remission, most recent episode mixed     History of trauma     Eating disorder, unspecified type     Insomnia due to mental condition       PLAN:  Medication Management: Continue current medications. Discussed risks, benefits, and alternatives to treatment plan documented above with patient. I answered all patient questions related to this plan, and patient expressed understanding and agreement.      Follow up in about 3 months (around 9/10/2024) for Medication follow up.     Medication List with Changes/Refills   Current Medications    ARIPIPRAZOLE (ABILIFY) 10 MG TAB    Take 1 tablet (10 mg total) by mouth once daily.    BUPROPION (WELLBUTRIN XL) 300 MG 24 HR TABLET    Take 1 tablet " (300 mg total) by mouth once daily.    BUSPIRONE (BUSPAR) 15 MG TABLET    Take 1 tablet (15 mg total) by mouth 2 (two) times daily.    DEXTROAMPHETAMINE-AMPHETAMINE (ADDERALL XR) 30 MG 24 HR CAPSULE    Take 1 capsule (30 mg total) by mouth every morning.    DEXTROAMPHETAMINE-AMPHETAMINE (ADDERALL XR) 30 MG 24 HR CAPSULE    Take 1 capsule (30 mg total) by mouth every morning.    DEXTROAMPHETAMINE-AMPHETAMINE (ADDERALL XR) 30 MG 24 HR CAPSULE    Take 1 capsule (30 mg total) by mouth every morning.    DIAZEPAM (VALIUM) 5 MG TABLET    Take 1 tablet (5 mg total) by mouth 2 (two) times daily as needed for Anxiety.    FREMANEZUMAB-VFRM (AJOVY AUTOINJECTOR) 225 MG/1.5 ML AUTOINJECTOR    Inject 1.5 mLs (225 mg total) into the skin every 28 days.    GABAPENTIN (NEURONTIN) 600 MG TABLET    Take 1 tablet (600 mg total) by mouth 2 (two) times daily.    LAMOTRIGINE (LAMICTAL) 100 MG TABLET    Take 1 tablet (100 mg total) by mouth once daily.    METFORMIN (GLUCOPHAGE-XR) 500 MG ER 24HR TABLET    Take 1 tablet (500 mg total) by mouth once daily for 14 days, THEN 1 tablet (500 mg total) 2 (two) times daily with meals for 14 days.    ONDANSETRON (ZOFRAN) 4 MG TABLET    Take 1 tablet (4 mg total) by mouth every 8 (eight) hours as needed for Nausea.    RIMEGEPANT (NURTEC) 75 MG ODT    Take 1 tablet (75 mg total) by mouth every 72 hours as needed for Migraine (2 to 3 time per week prn max). Place ODT tablet on the tongue; alternatively the ODT tablet may be placed under the tongue    TRAZODONE (DESYREL) 100 MG TABLET    Take 1 tablet (100 mg total) by mouth nightly as needed for Insomnia.      Time spent with pt including note preparation: 30 minutes     Jessica Warner, PhD, MP  Medical Psychologist

## 2024-06-12 ENCOUNTER — OFFICE VISIT (OUTPATIENT)
Dept: OBSTETRICS AND GYNECOLOGY | Facility: CLINIC | Age: 29
End: 2024-06-12
Payer: COMMERCIAL

## 2024-06-12 VITALS
SYSTOLIC BLOOD PRESSURE: 105 MMHG | BODY MASS INDEX: 42.91 KG/M2 | DIASTOLIC BLOOD PRESSURE: 65 MMHG | HEIGHT: 67 IN | WEIGHT: 273.38 LBS

## 2024-06-12 DIAGNOSIS — Z72.89 OTHER PROBLEMS RELATED TO LIFESTYLE: ICD-10-CM

## 2024-06-12 DIAGNOSIS — Z11.3 ENCOUNTER FOR SCREENING FOR INFECTIONS WITH PREDOMINANTLY SEXUAL MODE OF TRANSMISSION: ICD-10-CM

## 2024-06-12 DIAGNOSIS — Z30.431 ENCOUNTER FOR ROUTINE CHECKING OF INTRAUTERINE CONTRACEPTIVE DEVICE (IUD): ICD-10-CM

## 2024-06-12 DIAGNOSIS — Z01.419 ENCOUNTER FOR GYNECOLOGICAL EXAMINATION WITHOUT ABNORMAL FINDING: Primary | ICD-10-CM

## 2024-06-12 PROCEDURE — 87491 CHLMYD TRACH DNA AMP PROBE: CPT | Performed by: NURSE PRACTITIONER

## 2024-06-12 PROCEDURE — 3074F SYST BP LT 130 MM HG: CPT | Mod: CPTII,S$GLB,, | Performed by: NURSE PRACTITIONER

## 2024-06-12 PROCEDURE — 1159F MED LIST DOCD IN RCRD: CPT | Mod: CPTII,S$GLB,, | Performed by: NURSE PRACTITIONER

## 2024-06-12 PROCEDURE — 1160F RVW MEDS BY RX/DR IN RCRD: CPT | Mod: CPTII,S$GLB,, | Performed by: NURSE PRACTITIONER

## 2024-06-12 PROCEDURE — 3078F DIAST BP <80 MM HG: CPT | Mod: CPTII,S$GLB,, | Performed by: NURSE PRACTITIONER

## 2024-06-12 PROCEDURE — 3044F HG A1C LEVEL LT 7.0%: CPT | Mod: CPTII,S$GLB,, | Performed by: NURSE PRACTITIONER

## 2024-06-12 PROCEDURE — 99395 PREV VISIT EST AGE 18-39: CPT | Mod: S$GLB,,, | Performed by: NURSE PRACTITIONER

## 2024-06-12 PROCEDURE — 99999 PR PBB SHADOW E&M-EST. PATIENT-LVL III: CPT | Mod: PBBFAC,,, | Performed by: NURSE PRACTITIONER

## 2024-06-12 PROCEDURE — 3008F BODY MASS INDEX DOCD: CPT | Mod: CPTII,S$GLB,, | Performed by: NURSE PRACTITIONER

## 2024-06-12 NOTE — PROGRESS NOTES
Subjective:       Patient ID: Rafaela Zuniga is a 28 y.o. female.    Chief Complaint:  Well Woman      History of Present Illness  HPI  Annual Exam-Premenopausal  G0 presents for annual exam. The patient has no complaints today. The patient is not currently sexually active in the last five months; does desire cx, but no labs; does have a lot of pressure upon insertion.  Has tried lubricants with no relief.  Happens when she masturbates as well. GYN screening history: last pap: approximate date 2023 and was normal. Normal pap hx.  The patient wears seatbelts: yes. The patient participates in regular exercise: yes. Has the patient ever been transfused or tattooed?: yes. Tattoos.  The patient reports that there is not domestic violence in her life.    Liletta; cramping every couple months; spotting occasionally; not monthly; very light.      No bladder/bowel issues.    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 Patient has had an implant.   Date of Last Pap: 2023    OB History    Para Term  AB Living   0 0 0 0 0 0   SAB IAB Ectopic Multiple Live Births   0 0 0 0 0       Review of Systems  Review of Systems   Constitutional:  Negative for activity change, appetite change, chills, fatigue and fever.   HENT:  Negative for nasal congestion and mouth sores.    Respiratory:  Negative for cough, shortness of breath and wheezing.    Cardiovascular:  Negative for chest pain.   Gastrointestinal:  Negative for abdominal pain, constipation, diarrhea, nausea and vomiting.   Endocrine: Negative for hair loss and hot flashes.   Genitourinary:  Positive for vaginal pain (insertional). Negative for bladder incontinence, decreased libido, dysmenorrhea, dyspareunia, dysuria, frequency, genital sores, hematuria, hot flashes, menorrhagia, menstrual problem, pelvic pain, urgency, vaginal discharge, urinary incontinence, postcoital bleeding,  postmenopausal bleeding, vaginal dryness and vaginal odor.   Musculoskeletal:  Negative for back pain.   Integumentary:  Negative for breast mass, nipple discharge, breast skin changes and breast tenderness.   Neurological:  Negative for headaches.   Hematological:  Negative for adenopathy.   Psychiatric/Behavioral:  Negative for depression and sleep disturbance. The patient is not nervous/anxious.    All other systems reviewed and are negative.  Breast: Negative for breast self exam, lump, mass, mastodynia, nipple discharge, skin changes and tenderness          Objective:      Physical Exam:   Constitutional: She is oriented to person, place, and time. She appears well-developed and well-nourished. No distress.    HENT:   Head: Normocephalic and atraumatic.   Nose: Nose normal.    Eyes: Pupils are equal, round, and reactive to light. Conjunctivae and EOM are normal. Right eye exhibits no discharge. Left eye exhibits no discharge.     Cardiovascular:  Normal rate, regular rhythm and normal heart sounds.      Exam reveals no gallop, no friction rub, no clubbing, no cyanosis and no edema.       No murmur heard.   Pulmonary/Chest: Effort normal and breath sounds normal. No respiratory distress. She has no decreased breath sounds. She has no wheezes. She has no rhonchi. She has no rales. She exhibits no tenderness. Right breast exhibits no inverted nipple, no mass, no nipple discharge, no skin change, no tenderness, no bleeding and no swelling. Left breast exhibits no inverted nipple, no mass, no nipple discharge, no skin change, no tenderness, no bleeding and no swelling. Breasts are symmetrical.        Abdominal: Soft. Bowel sounds are normal. She exhibits no distension. There is no abdominal tenderness. There is no rebound and no guarding. Hernia confirmed negative in the right inguinal area and confirmed negative in the left inguinal area.     Genitourinary:    Inguinal canal, vagina, uterus, right adnexa and left  adnexa normal.   Rectum:      No external hemorrhoid.            Pelvic exam was performed with patient in the lithotomy position.   The external female genitalia was normal.   No external genitalia lesions identified,Genitalia hair distrobution normal .     Labial bartholins normal.There is no rash, tenderness, lesion or injury on the right labia. There is no rash, tenderness, lesion or injury on the left labia. Cervix is normal. Right adnexum displays no mass, no tenderness and no fullness. Left adnexum displays no mass, no tenderness and no fullness. No erythema, vaginal discharge, tenderness or bleeding in the vagina.    No foreign body in the vagina.      No signs of injury in the vagina.   Vagina was moist.Cervix exhibits no motion tenderness, no lesion, no discharge, no friability, no tenderness and no polyp.    pap smear not completedUerus contour normal  Uterus is not enlarged and not tender. Uterus size: 10 cm.Normal urethral meatus.Urethral Meatus exhibits: urethral lesionUrethra findings: no urethral mass, no tenderness, no urethral scarring and prolapsedBladder findings: no bladder distention and no bladder tendernessIUD strings visualized.          Musculoskeletal: Normal range of motion and moves all extremeties.      Lymphadenopathy: No inguinal adenopathy noted on the right or left side.    Neurological: She is alert and oriented to person, place, and time.    Skin: Skin is warm and dry. No rash noted. She is not diaphoretic. No cyanosis or erythema. No pallor. Nails show no clubbing.    Psychiatric: She has a normal mood and affect. Her speech is normal and behavior is normal. Judgment and thought content normal.             Assessment:        1. Encounter for gynecological examination without abnormal finding    2. Encounter for routine checking of intrauterine contraceptive device (IUD)    3. Encounter for screening for infections with predominantly sexual mode of transmission    4. Other  problems related to lifestyle               Plan:   Vaginal hygiene practices discussed.  Discussed oil lubricants versus vaginal estrogen short term  Will try oil based for now with hygiene practices; RTC if needed    G/c collected    Reviewed updated recommendations for pap smears (every 3 years) in low risk patients.  Recommend annual pelvic exams.  Reviewed recommendations for annual CBE.  Next pap due in 2026.   RTC 1 year or sooner prn.  To PCP for other health maintenance.      Encounter for gynecological examination without abnormal finding  -     C. trachomatis/N. gonorrhoeae by AMP DNA    Encounter for routine checking of intrauterine contraceptive device (IUD)    Encounter for screening for infections with predominantly sexual mode of transmission  -     C. trachomatis/N. gonorrhoeae by AMP DNA    Other problems related to lifestyle  -     C. trachomatis/N. gonorrhoeae by AMP DNA

## 2024-06-13 LAB
C TRACH DNA SPEC QL NAA+PROBE: NOT DETECTED
N GONORRHOEA DNA SPEC QL NAA+PROBE: NOT DETECTED

## 2024-06-26 ENCOUNTER — PATIENT MESSAGE (OUTPATIENT)
Dept: INTERNAL MEDICINE | Facility: CLINIC | Age: 29
End: 2024-06-26
Payer: COMMERCIAL

## 2024-07-22 ENCOUNTER — OFFICE VISIT (OUTPATIENT)
Dept: NEUROLOGY | Facility: CLINIC | Age: 29
End: 2024-07-22
Payer: COMMERCIAL

## 2024-07-22 VITALS
WEIGHT: 272.69 LBS | SYSTOLIC BLOOD PRESSURE: 104 MMHG | RESPIRATION RATE: 16 BRPM | HEIGHT: 67 IN | BODY MASS INDEX: 42.8 KG/M2 | HEART RATE: 80 BPM | DIASTOLIC BLOOD PRESSURE: 69 MMHG

## 2024-07-22 DIAGNOSIS — F41.1 GAD (GENERALIZED ANXIETY DISORDER): ICD-10-CM

## 2024-07-22 DIAGNOSIS — G43.119 CLASSICAL MIGRAINE WITH INTRACTABLE MIGRAINE: ICD-10-CM

## 2024-07-22 DIAGNOSIS — R11.0 CHRONIC NAUSEA: ICD-10-CM

## 2024-07-22 DIAGNOSIS — F90.9 ATTENTION DEFICIT HYPERACTIVITY DISORDER (ADHD), UNSPECIFIED ADHD TYPE: ICD-10-CM

## 2024-07-22 DIAGNOSIS — G93.2 IDIOPATHIC INTRACRANIAL HYPERTENSION: Primary | ICD-10-CM

## 2024-07-22 DIAGNOSIS — Z79.899 USE OF MEDICATION WITH TERATOGENIC POTENTIAL IN FEMALE OF REPRODUCTIVE AGE: ICD-10-CM

## 2024-07-22 DIAGNOSIS — G44.89 CHRONIC MIXED HEADACHE SYNDROME: ICD-10-CM

## 2024-07-22 DIAGNOSIS — F31.78 BIPOLAR DISORDER, IN FULL REMISSION, MOST RECENT EPISODE MIXED: ICD-10-CM

## 2024-07-22 DIAGNOSIS — Z97.5 IUD (INTRAUTERINE DEVICE) IN PLACE: ICD-10-CM

## 2024-07-22 PROCEDURE — 1160F RVW MEDS BY RX/DR IN RCRD: CPT | Mod: CPTII,S$GLB,, | Performed by: NURSE PRACTITIONER

## 2024-07-22 PROCEDURE — 3074F SYST BP LT 130 MM HG: CPT | Mod: CPTII,S$GLB,, | Performed by: NURSE PRACTITIONER

## 2024-07-22 PROCEDURE — 99999 PR PBB SHADOW E&M-EST. PATIENT-LVL V: CPT | Mod: PBBFAC,,, | Performed by: NURSE PRACTITIONER

## 2024-07-22 PROCEDURE — 3008F BODY MASS INDEX DOCD: CPT | Mod: CPTII,S$GLB,, | Performed by: NURSE PRACTITIONER

## 2024-07-22 PROCEDURE — 99214 OFFICE O/P EST MOD 30 MIN: CPT | Mod: S$GLB,,, | Performed by: NURSE PRACTITIONER

## 2024-07-22 PROCEDURE — 3044F HG A1C LEVEL LT 7.0%: CPT | Mod: CPTII,S$GLB,, | Performed by: NURSE PRACTITIONER

## 2024-07-22 PROCEDURE — 3078F DIAST BP <80 MM HG: CPT | Mod: CPTII,S$GLB,, | Performed by: NURSE PRACTITIONER

## 2024-07-22 PROCEDURE — 1159F MED LIST DOCD IN RCRD: CPT | Mod: CPTII,S$GLB,, | Performed by: NURSE PRACTITIONER

## 2024-07-22 RX ORDER — FOLIC ACID 1 MG/1
1 TABLET ORAL DAILY
Qty: 30 TABLET | Refills: 11 | Status: SHIPPED | OUTPATIENT
Start: 2024-07-22 | End: 2025-07-22

## 2024-07-22 RX ORDER — ONDANSETRON 4 MG/1
4 TABLET, FILM COATED ORAL EVERY 8 HOURS PRN
Qty: 30 TABLET | Refills: 0 | Status: SHIPPED | OUTPATIENT
Start: 2024-07-22

## 2024-07-22 RX ORDER — RIMEGEPANT SULFATE 75 MG/75MG
75 TABLET, ORALLY DISINTEGRATING ORAL
Qty: 8 TABLET | Refills: 2 | Status: SHIPPED | OUTPATIENT
Start: 2024-07-22

## 2024-07-22 RX ORDER — FREMANEZUMAB-VFRM 225 MG/1.5ML
225 INJECTION SUBCUTANEOUS
Qty: 4.5 ML | Refills: 3 | Status: SHIPPED | OUTPATIENT
Start: 2024-07-22 | End: 2025-07-22

## 2024-07-22 NOTE — PROGRESS NOTES
"Subjective:       Patient ID: Rafaela Zuniga is a 28 y.o. female.    Chief Complaint: Migraine          HPI          The patient presented virtually on 08- for evaluation of chronic mixed headaches.        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 CSF OP was in the 30's and did not respond to Diamox and TPM so she underwent RT VPS in 2017 which helped tremendously. No known history of SLE/AID. No confusion. No LOC/ELIZABETH. No seizures. No history of hypercoagulability.No excessive vitamin A intake/Accutane. No Doxycycline. No pregnancy. No BCPs/OCPs. Currently uses IUCD. On 05- Dr. Richardson NL Optic Disc No papilledema . On 05- Shunt Series Intact RT VPS and Brain MRV NL.      The patient suffered a different headaches since childhood. The headaches progress from different areas and involves different sides with a throbbing nature. It can involve the right side, the left side or both sides preceded by visual aura. No associated neurological deficits. History is significant for prodromal irritability and urinary frequency.  The headaches are getting more frequent and come every other day since . The headaches are also getting more severe 6-8/10 headaches that cause significant morbidity. The headache is associated with nausea and light, sound sensitivity. The patient also vomits occasionally.  The patient feels "down" during the headache with no racing. Physical and emotional stressors provoke and aggravate the headache.  The headache builds up slowly towards the middle of the day or the end of the day.   The headaches are progressively getting worse and interfering with daily activity.  The headache is associated with scalp sensitivity. Lack of sleep is a significant trigger of the headache.  No neck pain with radiation. No TMJ problems.  The patient " denies blurry vision and ear ringing with these headaches. The headache is not positional or postural but worsens with movement and Valsalva. Sleep help lessen the headache. No history of head trauma. No history of seizures. No history of smoking. No caffeine overuse. No vertigo. No blacking out.  No fever, chills, or rigors. No history of significant memory loss. No history of strokes.  The patient cannot think of food that aggravates the headache. Family history is remarkable for migraine (mother). No family history of early dementia.Patient insurance did not approve Aimovig.    Abortive therapies (tried and failed): OTC Medications, Caffeine, Triptans (Imitrex, Maxalt), Tramadol       Preventative therapies (tried and failed): AEDs (TPM, Diamox, LTG), BBs (Inderal, Toprol), CCBs (Verapamil), TCA (Elavil), SSRIs.     Birthcontrol: IUD in place        INTERVAL HISTORY 07-: Present for HA management.  Patient tolerating Ajovy/ Ligand Blocker 225 mg SQ monthly no side effects reported. However patient has issues receiving consistency with dose due to late deliveries.  Patient reports Nurtec 75 mg po prn is helpful no side effects no adverse reactions. Patient reports decreased frequency and intensity of headaches since initiation of treatment. Recommend restarting Folic acid 1 mg po daily r/t current treatment is a teratogenic treatment. Birthcontrol: IUD in place. Patient agree to plan of care.       Review of Systems   Constitutional:  Negative for appetite change and fatigue.   HENT:  Negative for hearing loss and tinnitus.    Eyes:  Negative for photophobia and visual disturbance.   Respiratory:  Negative for apnea and shortness of breath.    Cardiovascular:  Negative for chest pain and palpitations.   Gastrointestinal:  Negative for nausea and vomiting.   Endocrine: Negative for cold intolerance and heat intolerance.   Genitourinary:  Negative for difficulty urinating and urgency.   Musculoskeletal:   Negative for arthralgias, back pain, gait problem, joint swelling, myalgias, neck pain and neck stiffness.   Skin:  Negative for color change and rash.   Allergic/Immunologic: Negative for environmental allergies and immunocompromised state.   Neurological:  Positive for headaches. Negative for dizziness, tremors, seizures, syncope, facial asymmetry, speech difficulty, weakness, light-headedness and numbness.   Hematological:  Negative for adenopathy. Does not bruise/bleed easily.   Psychiatric/Behavioral:  Negative for agitation, behavioral problems, confusion, decreased concentration, dysphoric mood, hallucinations, self-injury, sleep disturbance and suicidal ideas. The patient is not hyperactive.                            Current Outpatient Medications:     ARIPiprazole (ABILIFY) 10 MG Tab, Take 1 tablet (10 mg total) by mouth once daily., Disp: 90 tablet, Rfl: 0    buPROPion (WELLBUTRIN XL) 300 MG 24 hr tablet, Take 1 tablet (300 mg total) by mouth once daily., Disp: 90 tablet, Rfl: 0    busPIRone (BUSPAR) 15 MG tablet, Take 1 tablet (15 mg total) by mouth 2 (two) times daily., Disp: 180 tablet, Rfl: 0    dextroamphetamine-amphetamine (ADDERALL XR) 30 MG 24 hr capsule, Take 1 capsule (30 mg total) by mouth every morning., Disp: 30 capsule, Rfl: 0    dextroamphetamine-amphetamine (ADDERALL XR) 30 MG 24 hr capsule, Take 1 capsule (30 mg total) by mouth every morning., Disp: 30 capsule, Rfl: 0    [START ON 8/6/2024] dextroamphetamine-amphetamine (ADDERALL XR) 30 MG 24 hr capsule, Take 1 capsule (30 mg total) by mouth every morning., Disp: 30 capsule, Rfl: 0    diazePAM (VALIUM) 5 MG tablet, Take 1 tablet (5 mg total) by mouth 2 (two) times daily as needed for Anxiety., Disp: 60 tablet, Rfl: 2    lamoTRIgine (LAMICTAL) 100 MG tablet, Take 1 tablet (100 mg total) by mouth once daily., Disp: 90 tablet, Rfl: 0    traZODone (DESYREL) 100 MG tablet, Take 1 tablet (100 mg total) by mouth nightly as needed for Insomnia.,  Disp: 90 tablet, Rfl: 0    folic acid (FOLVITE) 1 MG tablet, Take 1 tablet (1 mg total) by mouth once daily., Disp: 30 tablet, Rfl: 11    fremanezumab-vfrm (AJOVY AUTOINJECTOR) 225 mg/1.5 mL autoinjector, Inject 1.5 mLs (225 mg total) into the skin every 28 days., Disp: 4.5 mL, Rfl: 3    gabapentin (NEURONTIN) 600 MG tablet, Take 1 tablet (600 mg total) by mouth 2 (two) times daily., Disp: 60 tablet, Rfl: 1    metFORMIN (GLUCOPHAGE-XR) 500 MG ER 24hr tablet, Take 1 tablet (500 mg total) by mouth once daily for 14 days, THEN 1 tablet (500 mg total) 2 (two) times daily with meals for 14 days., Disp: 60 tablet, Rfl: 2    ondansetron (ZOFRAN) 4 MG tablet, Take 1 tablet (4 mg total) by mouth every 8 (eight) hours as needed for Nausea., Disp: 30 tablet, Rfl: 0    rimegepant (NURTEC) 75 mg odt, Take 1 tablet (75 mg total) by mouth every 72 hours as needed for Migraine (2 to 3 time per week prn max). Place ODT tablet on the tongue; alternatively the ODT tablet may be placed under the tongue, Disp: 8 tablet, Rfl: 2    Past Medical History:   Diagnosis Date    ADHD     Anxiety     Bipolar disorder     Depression     Idiopathic intracranial hypertension     Insomnia        Past Surgical History:   Procedure Laterality Date    brain shunt  2017    eye sheath finistration Bilateral     REPAIR OF LIGAMENT OF ANKLE Left 12/15/2023    Procedure: REPAIR, LIGAMENT, ANKLE;  Surgeon: Brandon Benjamin DPM;  Location: [facility] OR;  Service: Podiatry;  Laterality: Left;    REPAIR, TENDON, FLEXOR, LOWER EXTREMITY, SECONDARY, USING GRAFT IF INDICATED Left 12/15/2023    Procedure: REPAIR, TENDON, FLEXOR, LOWER EXTREMITY, SECONDARY, USING GRAFT IF INDICATED;  Surgeon: Brandon Benjamin DPM;  Location: [facility] OR;  Service: Podiatry;  Laterality: Left;       Social History     Socioeconomic History    Marital status: Single   Tobacco Use    Smoking status: Never    Smokeless tobacco: Never   Substance and Sexual Activity    Alcohol use: Yes      Comment: Drinks socially    Drug use: Never    Sexual activity: Yes     Partners: Female, Male     Birth control/protection: I.U.D.     Social Determinants of Health     Financial Resource Strain: Low Risk  (3/1/2024)    Overall Financial Resource Strain (CARDIA)     Difficulty of Paying Living Expenses: Not hard at all   Food Insecurity: No Food Insecurity (3/1/2024)    Hunger Vital Sign     Worried About Running Out of Food in the Last Year: Never true     Ran Out of Food in the Last Year: Never true   Transportation Needs: No Transportation Needs (3/1/2024)    PRAPARE - Transportation     Lack of Transportation (Medical): No     Lack of Transportation (Non-Medical): No   Physical Activity: Sufficiently Active (3/1/2024)    Exercise Vital Sign     Days of Exercise per Week: 3 days     Minutes of Exercise per Session: 60 min   Stress: Stress Concern Present (3/1/2024)    [employer] [place] [employer] - Occupational Stress Questionnaire     Feeling of Stress : To some extent   Housing Stability: Low Risk  (3/1/2024)    Housing Stability Vital Sign     Unable to Pay for Housing in the Last Year: No     Number of Places Lived in the Last Year: 1     Unstable Housing in the Last Year: No             Past/Current Medical/Surgical History, Past/Current Social History, Past/Current Family History and Past/Current Medications were reviewed in detail.        Objective:                 GENERAL APPEARANCE:           The patient looks comfortable.    BMI 36.91    No signs of respiratory distress.    Normal breathing pattern.    No dysmorphic features    Normal eye contact.         GENERAL MEDICAL EXAM:    HEENT:  Head is atraumatic normocephalic.      Neck and Axillae: No JVD. No visible lesions.    Cardiopulmonary: No cyanosis. No tachypnea. Normal respiratory effort.    Gastrointestinal/Urogenital:  No jaundice. No stomas or lesions. No visible hernias. No catheters.     Skin, Hair and Nails: No pathognonomic skin  rash. No neurofibromatosis. No visible lesions.No stigmata of autoimmune disease. No clubbing.    Limbs: No varicose veins. No visible swelling.    Muskoskeletal: No visible deformities.No visible lesions.             Neurologic Exam     Mental Status   Oriented to person, place, and time.   Follows 3 step commands.   Attention: normal. Concentration: normal.   Speech: speech is normal   Level of consciousness: alert  Able to name object. Able to repeat. Normal comprehension.     Cranial Nerves     CN III, IV, VI   Extraocular motions are normal.   CN III: no CN III palsy  CN VI: no CN VI palsy  Nystagmus: none   Diplopia: none  Ophthalmoparesis: none  Upgaze: normal  Downgaze: normal  Conjugate gaze: present    CN VII   Facial expression full, symmetric.   Right facial weakness: none  Left facial weakness: none    CN VIII   CN VIII normal.   Hearing: intact    CN IX, X   CN IX normal.   CN X normal.   Palate: symmetric    CN XII   CN XII normal.   Tongue: not atrophic  Fasciculations: absent  Tongue deviation: none    Motor Exam   Muscle bulk: normal  Right arm pronator drift: absent  Left arm pronator drift: absent  Moves BUE BLE Symmetrically.      Gait, Coordination, and Reflexes     Gait  Gait: normal    Coordination   Romberg: negative  Heel to shin coordination: normal  Tandem walking coordination: normal    Tremor   Resting tremor: absent  Intention tremor: absent  Action tremor: absent      Lab Results   Component Value Date    WBC 11.65 12/05/2023    HGB 12.3 12/05/2023    HCT 39.2 12/05/2023    MCV 88 12/05/2023     12/05/2023       Sodium   Date Value Ref Range Status   08/05/2023 141 136 - 145 mmol/L Final     Potassium   Date Value Ref Range Status   08/05/2023 4.2 3.5 - 5.1 mmol/L Final     Chloride   Date Value Ref Range Status   08/05/2023 105 95 - 110 mmol/L Final     CO2   Date Value Ref Range Status   08/05/2023 24 23 - 29 mmol/L Final     Glucose   Date Value Ref Range Status   08/05/2023  76 70 - 110 mg/dL Final     BUN   Date Value Ref Range Status   08/05/2023 13 6 - 20 mg/dL Final     Creatinine   Date Value Ref Range Status   08/05/2023 0.9 0.5 - 1.4 mg/dL Final     Calcium   Date Value Ref Range Status   08/05/2023 9.2 8.7 - 10.5 mg/dL Final     Total Protein   Date Value Ref Range Status   08/05/2023 7.2 6.0 - 8.4 g/dL Final     Albumin   Date Value Ref Range Status   08/05/2023 3.6 3.5 - 5.2 g/dL Final     Total Bilirubin   Date Value Ref Range Status   08/05/2023 0.2 0.1 - 1.0 mg/dL Final     Comment:     For infants and newborns, interpretation of results should be based  on gestational age, weight and in agreement with clinical  observations.    Premature Infant recommended reference ranges:  Up to 24 hours.............<8.0 mg/dL  Up to 48 hours............<12.0 mg/dL  3-5 days..................<15.0 mg/dL  6-29 days.................<15.0 mg/dL       Alkaline Phosphatase   Date Value Ref Range Status   08/05/2023 78 55 - 135 U/L Final     AST   Date Value Ref Range Status   08/05/2023 20 10 - 40 U/L Final     ALT   Date Value Ref Range Status   08/05/2023 16 10 - 44 U/L Final     Anion Gap   Date Value Ref Range Status   08/05/2023 12 8 - 16 mmol/L Final       Lab Results   Component Value Date    TSH 1.338 08/05/2023             LABORATORY EVALUATION    08-    STEVIE -ve, RF <13.0  level NL     Labs WNL     08-    CBC, CMP, TFT, HIV, HCV Unremarkable         RADIOLOGY EVALUATION       PRIOR RECORDS ([place], MD)    CSF OP 30's cm H2O    S/P RT VPS in 2017      05-    CTH RT VPS.    Shunt Series Intact RT VPS    Brain MRV NL                 NEUROPHYSIOLOGY AND NEURO OPHTHALMOLOGY EVALUATION       05-    Dr. Richardson NL Optic Disc No papilledema         PATHOLOGY EVALUATION        NEUROCOGNITIVE AND NEUROPSYCHOLOGY EVALUATION     Reviewed the neuroimaging independently       Assessment:           1. Idiopathic intracranial hypertension    2. Classical migraine with  intractable migraine    3. Chronic mixed headache syndrome    4. Chronic nausea    5. Bipolar disorder, in full remission, most recent episode mixed    6. IUD (intrauterine device) in place    7. ISAAC (generalized anxiety disorder)    8. 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 BMI 36.0-36.9,adult    10. Attention deficit hyperactivity disorder (ADHD), unspecified ADHD type              Plan:               IDIOPATHIC INTRACRANIAL HYPERTENSION (IIH) PKA PSEUDOTUMOR CEREBRI (PTC)    STATUS POST RIGHT VENTRICULOPERITONEAL SHUNT (VPS) IN 2017, WELL-CONTROLLED       Continue neurosurgical surveillance.     AID IMANI ZHENG.    Weight loss.      MIGRAINE, CLASSICAL, WITH AURA, EPISODIC, HIGH FREQUENCY           MANAGEMENT       HEADACHE DIARY     DISCUSSED THE THREE-FOLD MANAGEMENT OF MIGRAINE:      LIFESTYLE CHANGES:       Good sleep hygiene  Avoid general triggers like lack of sleep/too much sleep, prolonged sun exposure, excessive screen time and specific triggers based on you own diary   Minimize physical and emotional stress  Smoking avoidance and cessation  Limit caffeine drinks to 1-2 a day   Good hydration   Small frequent meals and avoid skipping meals   Moderate 30-minute-long aerobic exercises 3 times/week. Avoid strenuous exercise         ABORTIVE MEDICATIONS (ACUTE-RESCUE MEDICATIONS):     Should only be taken 2-3 times/week to avoid rebound and overuse headaches.    Continue  Nuretc (rimegepant)75 mg ODT PRN. Medication refills sent     Abortive therapies (tried and failed): OTC Medications, Caffeine, Triptans (Imitrex, Maxalt), Tramadol         AVOID NARCOTICS (OPIATES)      1. No randomized controlled study shows pain-free results with opioids in the treatment of migraine.     2. The physiologic consequences of opioid use are adverse, occur quickly, and can be permanent. Decreased gray matter, release of calcitonin gene-related peptide, dynorphin, and pro-inflammatory  peptides, and activation of excitatory glutamate receptors are all associated with opioid exposure.     3. Opioids are pro-nociceptive, prevent reversal of migraine central sensitization, and interfere with triptan effectiveness.     4.Opioids precipitate bad clinical outcomes, especially transformation to daily headache.     5. They cause disease progression, comorbidity, and excessive health care consumption.           NEXT OPTIONS:    Gepants:Ubrelvy (ubrogepant) 100 mg    Ditans: Reyvow (lasmiditan) 100 mg (No driving due to sedation)      LAST RESORT:     DHE NS Trudhesa (Max 2 a week)     C/I: concomitant use of vasoconstrictors like Triptans, strong CY inhibitors such as HAART PIs (eg, ritonavir, nelfinavir, or indinavir) and Macrolides (eg, erythromycin or clarithromycin), CAD, PVD, Stroke/TIA and Uncontrolled HTN.  Serious SEs include Vasospasm and Fibrosis (chronic use).       PREVENTATIVE (MORE ACCURATELY MIGRAINE REDUCTION) MEDICATIONS:           Since the patient's headache is very frequent a lengthy discussion about preventative medications was carried out.The patient understands that prevention means DECREASING frequency and severity and NOT elimination.The patient was made aware that any new medication can cause serious allergic reaction.The medication is considered failure only if a therapeutic dose reached and maintained for 6-8 weeks.        NEUROPHARMACOLOGY       Continue Fremnezumab (Ajovy) (Ligand Blocker): 225 mg SQ monthly. Medication refills sent     Preventative therapies (tried and failed): AEDs (TPM, Diamox, LTG), BBs (Inderal, Toprol), CCBs (Verapamil), TCA (Elavil), SSRIs. Insurance did not Aimovig       HELPFUL SUPPLEMENTS:     Helpful supplements include Co-Q 10, B2, Mg, Feverfew (Dolovent combination) and butterbur (Petadolex)        NEXT OPTIONS:       ANTI-CGRP AGENTS: Qulipta (alogepant) 60 mg QD, Galcanezumab (Emgality) 120 mg SQ Pen monthly after a loading dose of 240 mg   and Fremnezumab (Ajovy) (Ligand Blocker): 225 mg SQ monthly or 675 mg every 3 months     Botox 200 units every 3 months .      LAST RESORT OPTIONS:      Namenda 10 mg BID     Valproic acid/ Depakote         NEUROMODULATION     Cefaly, Relivion, Nerivio and GammaCore (VNS)             WOMEN IN CHILD BEARING PERIOD     All migraine medications are not safe during pregnancy and the patient was made aware of this fact. Any pregnancy should be planned, and medications should be stopped PRIOR to pregnancy planning. Folic acid 1 mg daily was recommended. However, hormonal birth control complicates the management of migraine and can exacerbate migraine. If possible, mechanical contraception should be a better option.                     MEDICAL/SURGICAL COMORBIDITIES     All relevant medical comorbidities noted and managed by primary care physician and medical care team.          HEALTHY LIFESTYLE AND PREVENTATIVE CARE    The patient to adhere to the age-appropriate health maintenance guidelines including screening tests and vaccinations. The patient to adhere to  healthy lifestyle, optimal weight, exercise, healthy diet, good sleep hygiene and avoiding drugs including smoking, alcohol and recreational drugs.                RTC in 4 months          Desi Valdivia MSN NP      Collaborating Provider: Mehran Alba MD, FAAN Neurologist/Epileptologist        I spent a total of 30 minutes on the day of the visit.  This includes face to face time and non-face to face time preparing to see the patient (eg, review of tests), obtaining and/or reviewing separately obtained history, documenting clinical information in the electronic or other health record, independently interpreting results and communicating results to the patient/family/caregiver, or care coordinator.

## 2024-08-30 ENCOUNTER — PATIENT MESSAGE (OUTPATIENT)
Dept: PSYCHIATRY | Facility: CLINIC | Age: 29
End: 2024-08-30
Payer: COMMERCIAL

## 2024-08-30 ENCOUNTER — TELEPHONE (OUTPATIENT)
Dept: PSYCHIATRY | Facility: CLINIC | Age: 29
End: 2024-08-30
Payer: COMMERCIAL

## 2024-09-02 ENCOUNTER — TELEPHONE (OUTPATIENT)
Dept: PHARMACY | Facility: CLINIC | Age: 29
End: 2024-09-02
Payer: COMMERCIAL

## 2024-09-02 NOTE — TELEPHONE ENCOUNTER
[facility]/Population Health Chart Review & Patient Outreach Details For Medication Adherence Project    Reason for Outreach Encounter: 3rd Party payor non-compliance report ([employer], [employer], C, etc)  2.  Patient Outreach Method: Reviewed patient chart   3.   Medication in question: metformin er 500 mg    LAST FILLED: n/a  Diabetes Medications               metFORMIN (GLUCOPHAGE-XR) 500 MG ER 24hr tablet Take 1 tablet (500 mg total) by mouth once daily for 14 days, THEN 1 tablet (500 mg total) 2 (two) times daily with meals for 14 days.              4.  Reviewed and or Updates Made To: Patient Chart  5. Outreach Outcomes and/or actions taken: Medication discontinued    Additional Notes:     Pt no longer taking

## 2024-09-03 ENCOUNTER — TELEPHONE (OUTPATIENT)
Dept: PSYCHIATRY | Facility: CLINIC | Age: 29
End: 2024-09-03
Payer: COMMERCIAL

## 2024-09-21 ENCOUNTER — PATIENT MESSAGE (OUTPATIENT)
Dept: PSYCHIATRY | Facility: CLINIC | Age: 29
End: 2024-09-21
Payer: COMMERCIAL

## 2024-09-25 DIAGNOSIS — F90.2 ATTENTION DEFICIT HYPERACTIVITY DISORDER (ADHD), COMBINED TYPE: ICD-10-CM

## 2024-09-25 DIAGNOSIS — F41.0 GENERALIZED ANXIETY DISORDER WITH PANIC ATTACKS: ICD-10-CM

## 2024-09-25 DIAGNOSIS — F41.1 GENERALIZED ANXIETY DISORDER WITH PANIC ATTACKS: ICD-10-CM

## 2024-09-25 DIAGNOSIS — F31.32 BIPOLAR 1 DISORDER, DEPRESSED, MODERATE: ICD-10-CM

## 2024-09-25 DIAGNOSIS — F51.05 INSOMNIA DUE TO MENTAL CONDITION: ICD-10-CM

## 2024-09-26 RX ORDER — DEXTROAMPHETAMINE SACCHARATE, AMPHETAMINE ASPARTATE MONOHYDRATE, DEXTROAMPHETAMINE SULFATE AND AMPHETAMINE SULFATE 7.5; 7.5; 7.5; 7.5 MG/1; MG/1; MG/1; MG/1
30 CAPSULE, EXTENDED RELEASE ORAL EVERY MORNING
Qty: 30 CAPSULE | Refills: 0 | Status: SHIPPED | OUTPATIENT
Start: 2024-09-26

## 2024-09-26 RX ORDER — BUPROPION HYDROCHLORIDE 300 MG/1
300 TABLET ORAL DAILY
Qty: 90 TABLET | Refills: 0 | Status: SHIPPED | OUTPATIENT
Start: 2024-09-26 | End: 2024-12-25

## 2024-09-26 RX ORDER — BUSPIRONE HYDROCHLORIDE 15 MG/1
15 TABLET ORAL 2 TIMES DAILY
Qty: 180 TABLET | Refills: 0 | Status: SHIPPED | OUTPATIENT
Start: 2024-09-26 | End: 2024-12-25

## 2024-09-26 RX ORDER — TRAZODONE HYDROCHLORIDE 100 MG/1
100 TABLET ORAL NIGHTLY PRN
Qty: 90 TABLET | Refills: 0 | Status: SHIPPED | OUTPATIENT
Start: 2024-09-26 | End: 2024-12-25

## 2024-09-26 RX ORDER — ARIPIPRAZOLE 10 MG/1
10 TABLET ORAL DAILY
Qty: 90 TABLET | Refills: 0 | Status: SHIPPED | OUTPATIENT
Start: 2024-09-26 | End: 2024-12-25

## 2024-09-26 RX ORDER — LAMOTRIGINE 100 MG/1
100 TABLET ORAL DAILY
Qty: 90 TABLET | Refills: 0 | Status: SHIPPED | OUTPATIENT
Start: 2024-09-26 | End: 2024-12-25

## 2024-09-26 NOTE — TELEPHONE ENCOUNTER
Pt was last seen by Dr. Warner on 6/10/24  Trazodone last filled on 6/10/24  Lamitcal last filled on 6/10/24  Adderall XR last filled on 6/10/24  Buspar last filled on 9/20/24  Wellbutrin XL last filled on 6/10/24  Ablilify last filled on 6/10/24

## 2024-11-25 ENCOUNTER — OFFICE VISIT (OUTPATIENT)
Dept: OPTOMETRY | Facility: CLINIC | Age: 29
End: 2024-11-25
Payer: COMMERCIAL

## 2024-11-25 DIAGNOSIS — G93.2 IDIOPATHIC INTRACRANIAL HYPERTENSION: Primary | ICD-10-CM

## 2024-11-25 DIAGNOSIS — H52.222 REGULAR ASTIGMATISM OF LEFT EYE: ICD-10-CM

## 2024-11-25 PROCEDURE — 92133 CPTRZD OPH DX IMG PST SGM ON: CPT | Mod: S$GLB,,, | Performed by: OPTOMETRIST

## 2024-11-25 PROCEDURE — 3044F HG A1C LEVEL LT 7.0%: CPT | Mod: CPTII,S$GLB,, | Performed by: OPTOMETRIST

## 2024-11-25 PROCEDURE — 99999 PR PBB SHADOW E&M-EST. PATIENT-LVL III: CPT | Mod: PBBFAC,,, | Performed by: OPTOMETRIST

## 2024-11-25 PROCEDURE — 1160F RVW MEDS BY RX/DR IN RCRD: CPT | Mod: CPTII,S$GLB,, | Performed by: OPTOMETRIST

## 2024-11-25 PROCEDURE — 92004 COMPRE OPH EXAM NEW PT 1/>: CPT | Mod: S$GLB,,, | Performed by: OPTOMETRIST

## 2024-11-25 PROCEDURE — 1159F MED LIST DOCD IN RCRD: CPT | Mod: CPTII,S$GLB,, | Performed by: OPTOMETRIST

## 2024-11-25 PROCEDURE — 92015 DETERMINE REFRACTIVE STATE: CPT | Mod: S$GLB,,, | Performed by: OPTOMETRIST

## 2024-11-25 NOTE — PROGRESS NOTES
HPI    FANNIE: 5/4/23 with Dr. Richardson   Last DFE: 5/4/23  Chief complaint (CC): 29 yo F here for annual eye exam. Pt c/o worsening   vision at near and distance. Pt seeing Dr. Araujo for Neurosurgery for   shunt maintenance. Dr. Skip Rendon for migraine maintenance. Increased   floaters OU over the last few months, states they previously went away but   returned a few months ago. Pt c/o pain/pressure behind eyes, 1-2x a week.   Pt taking Nurtec, Ajovy, pt states meds sometimes help with pressure   sensation but not always.   Glasses? +  Contacts? -  H/o eye surgery, injections or laser: Sheath fenestration OU 2017  H/o eye injury: -  Known eye conditions?    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 Cataracts  Eye gtts? -    (-) Flashes (+)  Floaters (-) Mucous   (-)  Tearing (-) Itching (-) Burning   (+) Headaches (+) Eye Pain/discomfort (-) Irritation   (-)  Redness (-) Double vision (+) Blurry vision    CL Exam: No    Diabetic? -  A1c? Lab Results       Component                Value               Date                       HGBA1C                   5.2                 03/07/2024            Last edited by Sofi Bernal, OD on 11/25/2024 12:24 PM.            Assessment /Plan     For exam results, see Encounter Report.      Idiopathic intracranial hypertension  - No evidence of disc edema today   - Longstanding h/o IIH s/p Sheath fenestration OU 2017 and  shunt.    - RTC next available for VF 24-2 OU SF and follow up     Regular astigmatism of left eye   - New Spectacle Rx given, discussed different options for glasses.

## 2024-12-14 DIAGNOSIS — F90.2 ATTENTION DEFICIT HYPERACTIVITY DISORDER (ADHD), COMBINED TYPE: ICD-10-CM

## 2024-12-16 ENCOUNTER — PATIENT MESSAGE (OUTPATIENT)
Dept: NEUROLOGY | Facility: CLINIC | Age: 29
End: 2024-12-16
Payer: COMMERCIAL

## 2024-12-16 DIAGNOSIS — G43.119 CLASSICAL MIGRAINE WITH INTRACTABLE MIGRAINE: ICD-10-CM

## 2024-12-16 DIAGNOSIS — G44.89 CHRONIC MIXED HEADACHE SYNDROME: ICD-10-CM

## 2024-12-16 DIAGNOSIS — G93.2 IDIOPATHIC INTRACRANIAL HYPERTENSION: ICD-10-CM

## 2024-12-16 RX ORDER — DEXTROAMPHETAMINE SACCHARATE, AMPHETAMINE ASPARTATE MONOHYDRATE, DEXTROAMPHETAMINE SULFATE AND AMPHETAMINE SULFATE 7.5; 7.5; 7.5; 7.5 MG/1; MG/1; MG/1; MG/1
30 CAPSULE, EXTENDED RELEASE ORAL EVERY MORNING
Qty: 30 CAPSULE | Refills: 0 | Status: SHIPPED | OUTPATIENT
Start: 2024-12-16 | End: 2024-12-17 | Stop reason: SDUPTHER

## 2024-12-16 NOTE — PROGRESS NOTES
MEDICATION MANAGEMENT SESSION: VIRTUAL    Name: Rafaela Zuniga  Age: 28 y.o.  : 1995    Preferred Name: Rafaela    Site: [place]--via virtual visit with synchronous audio and video. Patient presented at home, in the [facility].   Each patient to whom medical services by telemedicine is provided is:   (1) informed of the relationship between the physician and patient and the respective role of any other health care provider with respect to management of the patient;   (2) notified that he or she may decline to receive medical services by telemedicine and may withdraw from such care at any time.    Referring provider: Brenda Acosta PA-C    Reason for Visit:  Medication follow up    LAST VISIT 6/10/24:  Pt reports her mood has been stable and mostly positive since last appointment. She does not report depression or manic/hypomanic symptoms. She's faced multiple stressors, including losing her father several months ago. She reports she's coping well. Her anxiety level has fluctuated; she has not had any panic attacks. Adderall XR 30 mg qd works for her ADHD symptoms and no side effects. Pt had ankle/foot surgery, recovered 2 weeks in [facility] with her family (driven there by a friend) and is now in PT. She's completing schoolwork to graduate in August. She plans to then apply to graduate school.    CURRENT VISIT:  Pt reports being down/depressed recently as she faces first holiday season without her father; previously thought she was coping well but more focused on it now. She's been tearful, tired. No manic/hypomanic symptoms reported. She likes Abilify 10 mg qd and effect on stabilizing mood. She's experienced increased anxiety with panic; reports Valium 5 mg prn no longer helping to calm her. She sees therapist for CBT skills as well and applying those for episodic anxiety.    PLAN:  Titrate to Wellbutrin  mg qd;  Titrate to Valium 10 mg prn;  Titrate to Lamictal 150 mg qd;  Refill Abilify  10 mg qd;  Refill Adderall XR 30 mg qd;  RTC in 4 weeks.    Current Symptoms:   ADHD: Symptoms managed with Adderall XR 30 mg   Depressive Disorder: depressed mood, tired/fatigued, social isolation/withdrawal, tearfulness/crying   Anxiety Disorder: anxiety/nervousness, panic attacks, fatigue, excessive worry   Panic Disorder: nervous and rapid heart rate   Manic Disorder: denied   Psychotic Disorder: denied   Substance Use:  Alcohol: Socially once per month at most, 1-2 glasses of wine.          10/12/2023     2:03 PM   Adult ADHD Self-Report Scale   How often do you have trouble wrapping up the final details of a project once the chanllenging parts have been done? 3   How often do you have difficulty getting things in order when you have to do a task that requires organization? 4   How often do you have problems remembering appointments or obligations? 3   When you have a task that requires a lot of thought, how often do you avoid or delay getting started? 4   How often do you fidget or squirm with your hands or feet when you have to sit down for a long time? 4   How often do you feel overly active and compelled to do things, like you were driven by a motor? 2   Part A Score 20   How often do you make careless mistakes when you have to work on a boring or difficult project? 3   How often do you have difficulty keeping your attention when you are doing boring or repetitive work? 4   How often do you have difficulty concentrating on what people say to you, even when they are speaking to you directly? 2   How often do you misplace or have difficulty finding things at home or at work? 3   How often are you distracted by activity or noise around you? 4   How often do you leave your seat in meetings or other situations in which you are expected to remain seated? 2   How often do you feel restless or fidgety? 4   How often do you have difficulty unwinding and relaxing when you have time to yourself? 3   How often do you  "find yourself talking too much when you are in social situations? 4   When you're in a conversation, how often do you find yourself finishing the sentences of the people you are talking to before they can finish them themselves? 3   How often do you have difficulty waiting your turn in situations when turn taking is required? 2   How often do you interrupt others when they are busy? 3   Part B Score 37     Review of Systems   Constitutional:  Positive for fatigue. Negative for activity change, appetite change and unexpected weight change.   Respiratory:  Negative for shortness of breath.    Psychiatric/Behavioral:  Positive for depressed mood and dysphoric mood. Negative for agitation, behavioral problems, confusion, decreased concentration, hallucinations, self-injury, sleep disturbance and suicidal ideas. The patient is nervous/anxious. The patient is not hyperactive.       Constitutional:  Vitals:  Most recent vital signs were reviewed.   Last 3 sets of Vitals        6/12/2024    10:43 AM 7/22/2024     9:26 AM 11/25/2024    10:18 AM   Vitals - 1 value per visit   SYSTOLIC 105 104    DIASTOLIC 65 69    Pulse  80    Resp  16    Weight (lb) 273.37 272.71    Weight (kg) 124 123.7    Height 5' 7" (1.702 m) 5' 7" (1.702 m)    BMI (Calculated) 42.8 42.7    Pain Score Zero Zero Zero          Psychiatric:  Oriented: x 3   Attitude: cooperative   Eye Contact: good   Behavior: wnl   Mood: "been down/depressed"  Affect: restricted range   Attention: intact   Concentration: grossly intact   Thought Process: goal directed   Speech: intelligible  Volume: WNL   Quantity: WNL   Rhythm: WNL  Insight: fair to good   Threats: no SI / HI   Memory: Grossly intact  Psychosis: denies all   Estimate of Intellectual Function: average   Judgment:  fair to good  Relevant Elements of Neurological Exam: normal gait     Allergy Review:   Review of patient's allergies indicates:  No Known Allergies     Medical Problem List:   Patient Active " Problem List   Diagnosis    Bipolar disorder, in full remission, most recent episode mixed    Idiopathic intracranial hypertension    Presence of ventricular shunt    IUD (intrauterine device) in place    BMI 36.0-36.9,adult    ISAAC (generalized anxiety disorder)    Classical migraine with intractable migraine    Chronic mixed headache syndrome    Left ankle sprain    Insomnia    ADHD      Encounter Diagnoses   Name Primary?    Bipolar 1 disorder, depressed, moderate     Generalized anxiety disorder with panic attacks     Attention deficit hyperactivity disorder (ADHD), combined type     Insomnia due to mental condition     Personal history of nonsuicidal self-injury     Eating disorder, unspecified type     Bereavement reaction Yes      PLAN:  Medication Management: Continue current medications. Discussed risks, benefits, and alternatives to treatment plan documented above with patient. I answered all patient questions related to this plan, and patient expressed understanding and agreement.      Follow up in about 4 weeks (around 1/14/2025) for Medication follow up.     Medication List with Changes/Refills   New Medications    BUPROPION (WELLBUTRIN XL) 150 MG TB24 TABLET    Take 1 tablet (150 mg total) by mouth once daily.   Current Medications    FOLIC ACID (FOLVITE) 1 MG TABLET    Take 1 tablet (1 mg total) by mouth once daily.    FREMANEZUMAB-VFRM (AJOVY AUTOINJECTOR) 225 MG/1.5 ML AUTOINJECTOR    Inject 1.5 mLs (225 mg total) into the skin every 28 days.    GABAPENTIN (NEURONTIN) 600 MG TABLET    Take 1 tablet (600 mg total) by mouth 2 (two) times daily.    METFORMIN (GLUCOPHAGE-XR) 500 MG ER 24HR TABLET    Take 1 tablet (500 mg total) by mouth once daily for 14 days, THEN 1 tablet (500 mg total) 2 (two) times daily with meals for 14 days.    ONDANSETRON (ZOFRAN) 4 MG TABLET    Take 1 tablet (4 mg total) by mouth every 8 (eight) hours as needed for Nausea.    RIMEGEPANT (NURTEC) 75 MG ODT    Take 1 tablet (75 mg  total) by mouth every 72 hours as needed for Migraine (2 to 3 time per week prn max). Place ODT tablet on the tongue; alternatively the ODT tablet may be placed under the tongue   Changed and/or Refilled Medications    Modified Medication Previous Medication    ARIPIPRAZOLE (ABILIFY) 10 MG TAB ARIPiprazole (ABILIFY) 10 MG Tab       Take 1 tablet (10 mg total) by mouth once daily.    Take 1 tablet (10 mg total) by mouth once daily.    BUPROPION (WELLBUTRIN XL) 300 MG 24 HR TABLET buPROPion (WELLBUTRIN XL) 300 MG 24 hr tablet       Take 1 tablet (300 mg total) by mouth once daily.    Take 1 tablet (300 mg total) by mouth once daily.    BUSPIRONE (BUSPAR) 15 MG TABLET busPIRone (BUSPAR) 15 MG tablet       Take 1 tablet (15 mg total) by mouth 2 (two) times daily.    Take 1 tablet (15 mg total) by mouth 2 (two) times daily.    DEXTROAMPHETAMINE-AMPHETAMINE (ADDERALL XR) 30 MG 24 HR CAPSULE dextroamphetamine-amphetamine (ADDERALL XR) 30 MG 24 hr capsule       Take 1 capsule (30 mg total) by mouth every morning.    Take 1 capsule (30 mg total) by mouth every morning.    DEXTROAMPHETAMINE-AMPHETAMINE (ADDERALL XR) 30 MG 24 HR CAPSULE dextroamphetamine-amphetamine (ADDERALL XR) 30 MG 24 hr capsule       Take 1 capsule (30 mg total) by mouth every morning.    Take 1 capsule (30 mg total) by mouth every morning.    DEXTROAMPHETAMINE-AMPHETAMINE (ADDERALL XR) 30 MG 24 HR CAPSULE dextroamphetamine-amphetamine (ADDERALL XR) 30 MG 24 hr capsule       Take 1 capsule (30 mg total) by mouth every morning.    Take 1 capsule (30 mg total) by mouth every morning.    DIAZEPAM (VALIUM) 10 MG TAB diazePAM (VALIUM) 5 MG tablet       Take 1 tablet (10 mg total) by mouth 2 (two) times daily as needed for Anxiety.    Take 1 tablet (5 mg total) by mouth 2 (two) times daily as needed for Anxiety.    LAMOTRIGINE (LAMICTAL) 150 MG TAB lamoTRIgine (LAMICTAL) 100 MG tablet       Take 1 tablet (150 mg total) by mouth once daily.    Take 1 tablet (100  mg total) by mouth once daily.    TRAZODONE (DESYREL) 100 MG TABLET traZODone (DESYREL) 100 MG tablet       Take 1 tablet (100 mg total) by mouth nightly as needed for Insomnia.    Take 1 tablet (100 mg total) by mouth nightly as needed for Insomnia.      Time spent with pt including note preparation: 30 minutes     Jessica Warner, PhD, MP  Medical Psychologist

## 2024-12-17 ENCOUNTER — OFFICE VISIT (OUTPATIENT)
Dept: PSYCHIATRY | Facility: CLINIC | Age: 29
End: 2024-12-17
Payer: COMMERCIAL

## 2024-12-17 DIAGNOSIS — F41.1 GENERALIZED ANXIETY DISORDER WITH PANIC ATTACKS: ICD-10-CM

## 2024-12-17 DIAGNOSIS — Z91.52 PERSONAL HISTORY OF NONSUICIDAL SELF-INJURY: ICD-10-CM

## 2024-12-17 DIAGNOSIS — F50.9 EATING DISORDER, UNSPECIFIED TYPE: ICD-10-CM

## 2024-12-17 DIAGNOSIS — Z63.4 BEREAVEMENT REACTION: Primary | ICD-10-CM

## 2024-12-17 DIAGNOSIS — F43.20 BEREAVEMENT REACTION: Primary | ICD-10-CM

## 2024-12-17 DIAGNOSIS — F31.32 BIPOLAR 1 DISORDER, DEPRESSED, MODERATE: ICD-10-CM

## 2024-12-17 DIAGNOSIS — F51.05 INSOMNIA DUE TO MENTAL CONDITION: ICD-10-CM

## 2024-12-17 DIAGNOSIS — F90.2 ATTENTION DEFICIT HYPERACTIVITY DISORDER (ADHD), COMBINED TYPE: ICD-10-CM

## 2024-12-17 DIAGNOSIS — F41.0 GENERALIZED ANXIETY DISORDER WITH PANIC ATTACKS: ICD-10-CM

## 2024-12-17 PROCEDURE — 3044F HG A1C LEVEL LT 7.0%: CPT | Mod: CPTII,95,, | Performed by: PSYCHOLOGIST

## 2024-12-17 PROCEDURE — 99213 OFFICE O/P EST LOW 20 MIN: CPT | Mod: 95,,, | Performed by: PSYCHOLOGIST

## 2024-12-17 PROCEDURE — 1159F MED LIST DOCD IN RCRD: CPT | Mod: CPTII,95,, | Performed by: PSYCHOLOGIST

## 2024-12-17 RX ORDER — DEXTROAMPHETAMINE SACCHARATE, AMPHETAMINE ASPARTATE MONOHYDRATE, DEXTROAMPHETAMINE SULFATE AND AMPHETAMINE SULFATE 7.5; 7.5; 7.5; 7.5 MG/1; MG/1; MG/1; MG/1
30 CAPSULE, EXTENDED RELEASE ORAL EVERY MORNING
Qty: 30 CAPSULE | Refills: 0 | Status: SHIPPED | OUTPATIENT
Start: 2025-02-13

## 2024-12-17 RX ORDER — LAMOTRIGINE 100 MG/1
100 TABLET ORAL DAILY
Qty: 90 TABLET | Refills: 0 | Status: CANCELLED | OUTPATIENT
Start: 2024-12-17 | End: 2025-03-17

## 2024-12-17 RX ORDER — DEXTROAMPHETAMINE SACCHARATE, AMPHETAMINE ASPARTATE MONOHYDRATE, DEXTROAMPHETAMINE SULFATE AND AMPHETAMINE SULFATE 7.5; 7.5; 7.5; 7.5 MG/1; MG/1; MG/1; MG/1
30 CAPSULE, EXTENDED RELEASE ORAL EVERY MORNING
Qty: 30 CAPSULE | Refills: 0 | Status: SHIPPED | OUTPATIENT
Start: 2024-12-17

## 2024-12-17 RX ORDER — DIAZEPAM 10 MG/1
10 TABLET ORAL
Status: CANCELLED | OUTPATIENT
Start: 2024-12-17 | End: 2025-01-16

## 2024-12-17 RX ORDER — ARIPIPRAZOLE 10 MG/1
10 TABLET ORAL DAILY
Qty: 90 TABLET | Refills: 0 | Status: CANCELLED | OUTPATIENT
Start: 2024-12-17 | End: 2025-03-17

## 2024-12-17 RX ORDER — ARIPIPRAZOLE 10 MG/1
10 TABLET ORAL DAILY
Qty: 90 TABLET | Refills: 1 | Status: SHIPPED | OUTPATIENT
Start: 2024-12-17

## 2024-12-17 RX ORDER — TRAZODONE HYDROCHLORIDE 100 MG/1
100 TABLET ORAL NIGHTLY PRN
Qty: 90 TABLET | Refills: 0 | Status: SHIPPED | OUTPATIENT
Start: 2024-12-17 | End: 2025-03-17

## 2024-12-17 RX ORDER — BUPROPION HYDROCHLORIDE 150 MG/1
150 TABLET ORAL DAILY
Qty: 90 TABLET | Refills: 1 | Status: SHIPPED | OUTPATIENT
Start: 2024-12-17

## 2024-12-17 RX ORDER — DIAZEPAM 10 MG/1
10 TABLET ORAL 2 TIMES DAILY PRN
Qty: 60 TABLET | Refills: 2 | Status: SHIPPED | OUTPATIENT
Start: 2024-12-17 | End: 2025-03-17

## 2024-12-17 RX ORDER — BUSPIRONE HYDROCHLORIDE 15 MG/1
15 TABLET ORAL 2 TIMES DAILY
Qty: 180 TABLET | Refills: 0 | Status: SHIPPED | OUTPATIENT
Start: 2024-12-17 | End: 2025-03-17

## 2024-12-17 RX ORDER — DIAZEPAM 5 MG/1
5 TABLET ORAL 2 TIMES DAILY PRN
Qty: 60 TABLET | Refills: 2 | Status: CANCELLED | OUTPATIENT
Start: 2024-12-17 | End: 2025-03-17

## 2024-12-17 RX ORDER — BUPROPION HYDROCHLORIDE 300 MG/1
300 TABLET ORAL DAILY
Qty: 90 TABLET | Refills: 1 | Status: SHIPPED | OUTPATIENT
Start: 2024-12-17

## 2024-12-17 RX ORDER — LAMOTRIGINE 150 MG/1
150 TABLET ORAL DAILY
Qty: 90 TABLET | Refills: 1 | Status: SHIPPED | OUTPATIENT
Start: 2024-12-17

## 2024-12-17 RX ORDER — DEXTROAMPHETAMINE SACCHARATE, AMPHETAMINE ASPARTATE MONOHYDRATE, DEXTROAMPHETAMINE SULFATE AND AMPHETAMINE SULFATE 7.5; 7.5; 7.5; 7.5 MG/1; MG/1; MG/1; MG/1
30 CAPSULE, EXTENDED RELEASE ORAL EVERY MORNING
Qty: 30 CAPSULE | Refills: 0 | Status: SHIPPED | OUTPATIENT
Start: 2025-01-15

## 2024-12-17 SDOH — SOCIAL DETERMINANTS OF HEALTH (SDOH): DISSAPEARANCE AND DEATH OF FAMILY MEMBER: Z63.4

## 2024-12-19 RX ORDER — FREMANEZUMAB-VFRM 225 MG/1.5ML
225 INJECTION SUBCUTANEOUS
Qty: 4.5 ML | Refills: 3 | Status: SHIPPED | OUTPATIENT
Start: 2024-12-19 | End: 2025-12-19

## 2024-12-27 ENCOUNTER — PATIENT MESSAGE (OUTPATIENT)
Dept: OPTOMETRY | Facility: CLINIC | Age: 29
End: 2024-12-27
Payer: COMMERCIAL

## 2025-05-26 ENCOUNTER — OFFICE VISIT (OUTPATIENT)
Dept: PSYCHIATRY | Facility: CLINIC | Age: 30
End: 2025-05-26
Payer: COMMERCIAL

## 2025-05-26 DIAGNOSIS — Z87.828 HISTORY OF TRAUMA: ICD-10-CM

## 2025-05-26 DIAGNOSIS — F41.0 GENERALIZED ANXIETY DISORDER WITH PANIC ATTACKS: Primary | ICD-10-CM

## 2025-05-26 DIAGNOSIS — F31.75 BIPOLAR DISORDER, IN PARTIAL REMISSION, MOST RECENT EPISODE DEPRESSED: ICD-10-CM

## 2025-05-26 DIAGNOSIS — F41.1 GENERALIZED ANXIETY DISORDER WITH PANIC ATTACKS: Primary | ICD-10-CM

## 2025-05-26 DIAGNOSIS — F50.9 EATING DISORDER, UNSPECIFIED TYPE: ICD-10-CM

## 2025-05-26 DIAGNOSIS — F31.32 BIPOLAR 1 DISORDER, DEPRESSED, MODERATE: ICD-10-CM

## 2025-05-26 DIAGNOSIS — Z91.52 PERSONAL HISTORY OF NONSUICIDAL SELF-INJURY: ICD-10-CM

## 2025-05-26 DIAGNOSIS — F51.05 INSOMNIA DUE TO MENTAL CONDITION: ICD-10-CM

## 2025-05-26 DIAGNOSIS — F90.2 ATTENTION DEFICIT HYPERACTIVITY DISORDER (ADHD), COMBINED TYPE: ICD-10-CM

## 2025-05-26 PROCEDURE — 98005 SYNCH AUDIO-VIDEO EST LOW 20: CPT | Mod: 95,,, | Performed by: PSYCHOLOGIST

## 2025-05-26 PROCEDURE — 1159F MED LIST DOCD IN RCRD: CPT | Mod: CPTII,95,, | Performed by: PSYCHOLOGIST

## 2025-05-26 RX ORDER — DEXTROAMPHETAMINE SACCHARATE, AMPHETAMINE ASPARTATE MONOHYDRATE, DEXTROAMPHETAMINE SULFATE AND AMPHETAMINE SULFATE 7.5; 7.5; 7.5; 7.5 MG/1; MG/1; MG/1; MG/1
30 CAPSULE, EXTENDED RELEASE ORAL EVERY MORNING
Qty: 30 CAPSULE | Refills: 0 | Status: SHIPPED | OUTPATIENT
Start: 2025-05-26

## 2025-05-26 RX ORDER — ARIPIPRAZOLE 10 MG/1
10 TABLET ORAL DAILY
Qty: 90 TABLET | Refills: 3 | Status: SHIPPED | OUTPATIENT
Start: 2025-05-26 | End: 2026-05-26

## 2025-05-26 RX ORDER — BUPROPION HYDROCHLORIDE 150 MG/1
150 TABLET ORAL DAILY
Qty: 90 TABLET | Refills: 3 | Status: SHIPPED | OUTPATIENT
Start: 2025-05-26 | End: 2025-05-26

## 2025-05-26 RX ORDER — TRAZODONE HYDROCHLORIDE 100 MG/1
100 TABLET ORAL NIGHTLY PRN
Qty: 90 TABLET | Refills: 3 | Status: SHIPPED | OUTPATIENT
Start: 2025-05-26 | End: 2026-05-26

## 2025-05-26 RX ORDER — BUPROPION HYDROCHLORIDE 300 MG/1
300 TABLET ORAL DAILY
Qty: 90 TABLET | Refills: 3 | Status: SHIPPED | OUTPATIENT
Start: 2025-05-26 | End: 2025-05-26

## 2025-05-26 RX ORDER — DEXTROAMPHETAMINE SACCHARATE, AMPHETAMINE ASPARTATE MONOHYDRATE, DEXTROAMPHETAMINE SULFATE AND AMPHETAMINE SULFATE 7.5; 7.5; 7.5; 7.5 MG/1; MG/1; MG/1; MG/1
30 CAPSULE, EXTENDED RELEASE ORAL EVERY MORNING
Qty: 30 CAPSULE | Refills: 0 | Status: SHIPPED | OUTPATIENT
Start: 2025-06-23

## 2025-05-26 RX ORDER — DEXTROAMPHETAMINE SACCHARATE, AMPHETAMINE ASPARTATE MONOHYDRATE, DEXTROAMPHETAMINE SULFATE AND AMPHETAMINE SULFATE 7.5; 7.5; 7.5; 7.5 MG/1; MG/1; MG/1; MG/1
30 CAPSULE, EXTENDED RELEASE ORAL EVERY MORNING
Qty: 30 CAPSULE | Refills: 0 | Status: SHIPPED | OUTPATIENT
Start: 2025-07-20

## 2025-05-26 RX ORDER — BUPROPION HYDROCHLORIDE 150 MG/1
150 TABLET ORAL DAILY
Qty: 90 TABLET | Refills: 3 | Status: SHIPPED | OUTPATIENT
Start: 2025-05-26 | End: 2026-05-26

## 2025-05-26 RX ORDER — BUSPIRONE HYDROCHLORIDE 15 MG/1
15 TABLET ORAL 2 TIMES DAILY
Qty: 180 TABLET | Refills: 3 | Status: SHIPPED | OUTPATIENT
Start: 2025-05-26 | End: 2026-05-26

## 2025-05-26 RX ORDER — BUPROPION HYDROCHLORIDE 300 MG/1
300 TABLET ORAL DAILY
Qty: 90 TABLET | Refills: 3 | Status: SHIPPED | OUTPATIENT
Start: 2025-05-26 | End: 2026-05-26

## 2025-05-26 RX ORDER — ARIPIPRAZOLE 10 MG/1
10 TABLET ORAL DAILY
Qty: 90 TABLET | Refills: 3 | Status: SHIPPED | OUTPATIENT
Start: 2025-05-26 | End: 2025-05-26

## 2025-05-26 RX ORDER — LAMOTRIGINE 150 MG/1
150 TABLET ORAL DAILY
Qty: 90 TABLET | Refills: 3 | Status: SHIPPED | OUTPATIENT
Start: 2025-05-26 | End: 2025-05-26

## 2025-05-26 RX ORDER — DIAZEPAM 10 MG/1
10 TABLET ORAL 2 TIMES DAILY PRN
Qty: 60 TABLET | Refills: 5 | Status: SHIPPED | OUTPATIENT
Start: 2025-05-26

## 2025-05-26 RX ORDER — LAMOTRIGINE 150 MG/1
150 TABLET ORAL DAILY
Qty: 90 TABLET | Refills: 3 | Status: SHIPPED | OUTPATIENT
Start: 2025-05-26 | End: 2026-05-26

## 2025-05-26 NOTE — PROGRESS NOTES
"MEDICATION MANAGEMENT SESSION: VIRTUAL    Name: Rafaela Zuniga  Age: 29 y.o.  : 1995    Preferred Name: Rafaela    Site: [place]--via virtual visit with synchronous audio and video. Patient presented at home, in the state [facility].   Each patient to whom medical services by telemedicine is provided is:   (1) informed of the relationship between the physician and patient and the respective role of any other health care provider with respect to management of the patient;   (2) notified that he or she may decline to receive medical services by telemedicine and may withdraw from such care at any time.    Referring provider: Brenda Acosta PA-C    Reason for Visit:  Medication follow up    History of Present Illness    CHIEF COMPLAINT:  Patient presents for medication refills, particularly in anticipation of losing insurance due to a job change and upcoming relocation.    HPI:  Patient reports overall mood stability, but acknowledges some stress due to recent life changes. She states, "Everything's been pretty stable for me. The changes in the last six months have definitely been taking a toll on me a little bit, but it has not been anything major."    Patient reports increased daytime fatigue and changes in sleep patterns. She mentions feeling more tired during the day, possibly due to stress, and wanting to sleep more. However, she's noticed staying up later at night. Patient describes, "I just feel like I'm drained. I'm exhausted, I have no energy."    Patient continues to use Valium as needed for anxiety management, indicating ongoing anxiety symptoms that occasionally become severe enough to require medication.    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 These changes are contributing to her stress levels but also represent positive developments in her life. Patient denies " any major mood instability or significant worsening of symptoms.    MEDICATIONS:  Patient is on Lamictal 150 mg, Abilify 10 mg, and Wellbutrin 450 mg. She takes Valium 10 mg as needed for anxiety. She is also on Buspar 15 mg bid, and Trazodone 100 mg qhs for sleep. Patient is on Adderall XR 30 mg for ADHD.    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 She is currently on summer break until August, when she will be starting a new job along with her PhD program. Patient is planning to move from [place] to [place] on June 25th. This summer break provides her time to reset and establish new habits before starting her PhD program and new job.         ADHD: Symptoms managed with Adderall XR 30 mg   Depressive Disorder: tired/fatigued   Anxiety Disorder: anxiety/nervousness, stress/worry, panic episodes, fatigue   Panic Disorder: nervous and rapid heart rate   Manic Disorder: denied   Psychotic Disorder: denied   Substance Use:  Alcohol: Socially once per month at most, 1-2 glasses of wine.          10/12/2023     2:03 PM   Adult ADHD Self-Report Scale   How often do you have trouble wrapping up the final details of a project once the chanllenging parts have been done? 3   How often do you have difficulty getting things in order when you have to do a task that requires organization? 4   How often do you have problems remembering appointments or obligations? 3   When you have a task that requires a lot of thought, how often do you avoid or delay getting started? 4   How often do you fidget or squirm with your hands or feet when you have to sit down for a long time? 4   How often do you feel overly active and compelled to do things, like you were driven by a motor? 2   Part A Score 20   How often do you make careless mistakes when you have to work  "on a boring or difficult project? 3   How often do you have difficulty keeping your attention when you are doing boring or repetitive work? 4   How often do you have difficulty concentrating on what people say to you, even when they are speaking to you directly? 2   How often do you misplace or have difficulty finding things at home or at work? 3   How often are you distracted by activity or noise around you? 4   How often do you leave your seat in meetings or other situations in which you are expected to remain seated? 2   How often do you feel restless or fidgety? 4   How often do you have difficulty unwinding and relaxing when you have time to yourself? 3   How often do you find yourself talking too much when you are in social situations? 4   When you're in a conversation, how often do you find yourself finishing the sentences of the people you are talking to before they can finish them themselves? 3   How often do you have difficulty waiting your turn in situations when turn taking is required? 2   How often do you interrupt others when they are busy? 3   Part B Score 37     Review of Systems   Constitutional:  Positive for fatigue. Negative for activity change, appetite change and unexpected weight change.   Respiratory:  Negative for shortness of breath.    Psychiatric/Behavioral:  Negative for agitation, behavioral problems, confusion, decreased concentration, dysphoric mood, hallucinations, self-injury, sleep disturbance and suicidal ideas. The patient is nervous/anxious. The patient is not hyperactive.       Constitutional:  Vitals:  Most recent vital signs were reviewed.   Last 3 sets of Vitals        6/12/2024    10:43 AM 7/22/2024     9:26 AM 11/25/2024    10:18 AM   Vitals - 1 value per visit   SYSTOLIC 105 104    DIASTOLIC 65 69    Pulse  80    Resp  16    Weight (lb) 273.37 272.71    Weight (kg) 124 123.7    Height 5' 7" (1.702 m) 5' 7" (1.702 m)    BMI (Calculated) 42.8 42.7    Pain Score Zero Zero " "Zero          Psychiatric:  Oriented: x 3   Attitude: cooperative   Eye Contact: good   Behavior: wnl   Mood: "Little stressed lately but good"  Affect: appropriate range   Attention: intact   Concentration: grossly intact   Thought Process: goal directed   Speech: intelligible  Volume: WNL   Quantity: WNL   Rhythm: WNL  Insight: fair to good   Threats: no SI / HI   Memory: Intact  Psychosis: denies all   Estimate of Intellectual Function: average   Judgment:  fair to good  Relevant Elements of Neurological Exam: normal gait     Allergy Review:   Review of patient's allergies indicates:  No Known Allergies     Medical Problem List:   Problem List[1]     Encounter Diagnoses   Name Primary?    Bipolar disorder, in partial remission, most recent episode depressed     Generalized anxiety disorder with panic attacks Yes    Attention deficit hyperactivity disorder (ADHD), combined type     Insomnia due to mental condition     Eating disorder, unspecified type     Bipolar 1 disorder, depressed, moderate     History of trauma     Personal history of nonsuicidal self-injury      Assessment & Plan    F31.9 Bipolar disorder, unspecified  F41.9 Anxiety disorder, unspecified  F90.9 Attention-deficit hyperactivity disorder, unspecified type  G47.9 Sleep disorder, unspecified  R53.83 Other fatigue    IMPRESSION:  - Assessed mood stability and sleep patterns in context of recent life changes.  - Evaluated current medication regimen effectiveness.  - Considered upcoming relocation and loss of insurance when planning medication management.    PLAN SUMMARY:  1. Continue Lamictal, Abilify, Valium, Wellbutrin, Buspar, and Trazodone; refilled for 6 months  2. Continue Adderall; refilled for 3 months  3. Medications refilled to support patient during transition to new healthcare provider    PLAN NOTE:  1. Continued Adderall, refilled for 3 months.  2. Continued Lamictal, Abilify, Valium, Wellbutrin, Buspar, Trazodone, refilled for 6 " months to support patient during transition to new healthcare provider.   Follow up pt moving to [place] next month; plans to establish care in [place], for Medication follow up.     Medication List with Changes/Refills   Current Medications    FOLIC ACID (FOLVITE) 1 MG TABLET    Take 1 tablet (1 mg total) by mouth once daily.    FREMANEZUMAB-VFRM (AJOVY AUTOINJECTOR) 225 MG/1.5 ML AUTOINJECTOR    Inject 1.5 mLs (225 mg total) into the skin every 28 days.    GABAPENTIN (NEURONTIN) 600 MG TABLET    Take 1 tablet (600 mg total) by mouth 2 (two) times daily.    METFORMIN (GLUCOPHAGE-XR) 500 MG ER 24HR TABLET    Take 1 tablet (500 mg total) by mouth once daily for 14 days, THEN 1 tablet (500 mg total) 2 (two) times daily with meals for 14 days.    ONDANSETRON (ZOFRAN) 4 MG TABLET    Take 1 tablet (4 mg total) by mouth every 8 (eight) hours as needed for Nausea.    RIMEGEPANT (NURTEC) 75 MG ODT    Take 1 tablet (75 mg total) by mouth every 72 hours as needed for Migraine (2 to 3 time per week prn max). Place ODT tablet on the tongue; alternatively the ODT tablet may be placed under the tongue   Changed and/or Refilled Medications    Modified Medication Previous Medication    ARIPIPRAZOLE (ABILIFY) 10 MG TAB ARIPiprazole (ABILIFY) 10 MG Tab       Take 1 tablet (10 mg total) by mouth once daily.    Take 1 tablet (10 mg total) by mouth once daily.    BUPROPION (WELLBUTRIN XL) 150 MG TB24 TABLET buPROPion (WELLBUTRIN XL) 150 MG TB24 tablet       Take 1 tablet (150 mg total) by mouth once daily.    Take 1 tablet (150 mg total) by mouth once daily.    BUPROPION (WELLBUTRIN XL) 300 MG 24 HR TABLET buPROPion (WELLBUTRIN XL) 300 MG 24 hr tablet       Take 1 tablet (300 mg total) by mouth once daily.    Take 1 tablet (300 mg total) by mouth once daily.    BUSPIRONE (BUSPAR) 15 MG TABLET busPIRone (BUSPAR) 15 MG tablet       Take 1 tablet (15 mg total) by mouth 2 (two) times daily.    Take 1 tablet (15 mg total) by mouth 2 (two)  times daily.    DEXTROAMPHETAMINE-AMPHETAMINE (ADDERALL XR) 30 MG 24 HR CAPSULE dextroamphetamine-amphetamine (ADDERALL XR) 30 MG 24 hr capsule       Take 1 capsule (30 mg total) by mouth every morning.    Take 1 capsule (30 mg total) by mouth every morning.    DEXTROAMPHETAMINE-AMPHETAMINE (ADDERALL XR) 30 MG 24 HR CAPSULE dextroamphetamine-amphetamine (ADDERALL XR) 30 MG 24 hr capsule       Take 1 capsule (30 mg total) by mouth every morning.    Take 1 capsule (30 mg total) by mouth every morning.    DEXTROAMPHETAMINE-AMPHETAMINE (ADDERALL XR) 30 MG 24 HR CAPSULE dextroamphetamine-amphetamine (ADDERALL XR) 30 MG 24 hr capsule       Take 1 capsule (30 mg total) by mouth every morning.    Take 1 capsule (30 mg total) by mouth every morning.    DIAZEPAM (VALIUM) 10 MG TAB diazePAM (VALIUM) 10 MG Tab       Take 1 tablet (10 mg total) by mouth 2 (two) times daily as needed (anxiety).    Take 1 tablet (10 mg total) by mouth 2 (two) times daily as needed for Anxiety.    LAMOTRIGINE (LAMICTAL) 150 MG TAB lamoTRIgine (LAMICTAL) 150 MG Tab       Take 1 tablet (150 mg total) by mouth once daily.    Take 1 tablet (150 mg total) by mouth once daily.    TRAZODONE (DESYREL) 100 MG TABLET traZODone (DESYREL) 100 MG tablet       Take 1 tablet (100 mg total) by mouth nightly as needed for Insomnia.    Take 1 tablet (100 mg total) by mouth nightly as needed for Insomnia.      Time spent with pt including note preparation: 30 minutes     Jessica Warner, PhD, MP  Medical Psychologist    This note was generated with the assistance of ambient listening technology. Verbal consent was obtained by the patient and accompanying visitor(s) for the recording of patient appointment to facilitate this note. I attest to having reviewed and edited the generated note for accuracy, though some syntax or spelling errors may persist. Please contact the author of this note for any clarification.    Face to Face time with patient: 23  30 minutes of  total time spent on the encounter, which includes face to face time and non-face to face time preparing to see the patient (eg, review of tests), Obtaining and/or reviewing separately obtained history, Documenting clinical information in the electronic or other health record, Independently interpreting results (not separately reported) and communicating results to the patient/family/caregiver, or Care coordination (not separately reported).     Each patient to whom he or she provides medical services by telemedicine is:  (1) informed of the relationship between the physician and patient and the respective role of any other health care provider with respect to management of the patient; and (2) notified that he or she may decline to receive medical services by telemedicine and may withdraw from such care at any time.                             [1]   Patient Active Problem List  Diagnosis    Bipolar disorder, in full remission, most recent episode mixed    Idiopathic intracranial hypertension    Presence of ventricular shunt    IUD (intrauterine device) in place    BMI 36.0-36.9,adult    ISAAC (generalized anxiety disorder)    Classical migraine with intractable migraine    Chronic mixed headache syndrome    Left ankle sprain    Insomnia    ADHD

## (undated) DEVICE — DRAPE THREE-QTR REINF 53X77IN

## (undated) DEVICE — BLADE SURG #15 CARBON STEEL

## (undated) DEVICE — BNDG COFLEX FOAM LF2 ST 4X5YD

## (undated) DEVICE — DRAPE U SPLIT SHEET 54X76IN

## (undated) DEVICE — UNDERGLOVES BIOGEL PI SZ 7 LF

## (undated) DEVICE — TOURNIQUET SB QC DP 24X4IN

## (undated) DEVICE — PAD ABDOMINAL STERILE 8X10IN

## (undated) DEVICE — ELECTRODE REM PLYHSV RETURN 9

## (undated) DEVICE — SUT VICRYL PLUS 0 CT1 36IN

## (undated) DEVICE — GLOVE BIOGEL SENSOR SZ 6.5

## (undated) DEVICE — TOWEL OR DISP STRL BLUE 4/PK

## (undated) DEVICE — DRAPE T EXTRM SURG 121X128X90

## (undated) DEVICE — BOOT WALKER ROCKER MEDIUM

## (undated) DEVICE — SPONGE COTTON TRAY 4X4IN

## (undated) DEVICE — BANDAGE ESMARK ELASTIC ST 6X9

## (undated) DEVICE — APPLICATOR CHLORAPREP ORN 26ML

## (undated) DEVICE — BANDAGE MATRIX HK LOOP 4IN 5YD

## (undated) DEVICE — SUT ETHIBOND XTRA2 OS-4 30

## (undated) DEVICE — SYR LUER LOCK STERILE 10ML

## (undated) DEVICE — BANDAGE ROLL COTTN 4.5INX4.1YD

## (undated) DEVICE — PACK BASIC SETUP SC BR

## (undated) DEVICE — GAUZE CNFRM STRL 4INX4.1YD

## (undated) DEVICE — ADHESIVE MASTISOL VIAL 48/BX

## (undated) DEVICE — COVER LIGHT HANDLE 80/CA

## (undated) DEVICE — BANDAGE MATRIX HK LOOP 6IN 5YD

## (undated) DEVICE — GOWN POLY REINF BRTH SLV XL

## (undated) DEVICE — DRAPE MOBILE C-ARM

## (undated) DEVICE — MANIFOLD 4 PORT

## (undated) DEVICE — STOCKINET TUBULAR 1 PLY 6X60IN

## (undated) DEVICE — SUT ETHILON 3-0 PS2 18 BLK